# Patient Record
Sex: FEMALE | Race: BLACK OR AFRICAN AMERICAN | Employment: OTHER | URBAN - METROPOLITAN AREA
[De-identification: names, ages, dates, MRNs, and addresses within clinical notes are randomized per-mention and may not be internally consistent; named-entity substitution may affect disease eponyms.]

---

## 2017-02-03 ENCOUNTER — TELEPHONE (OUTPATIENT)
Dept: INTERNAL MEDICINE | Age: 72
End: 2017-02-03

## 2017-03-16 ENCOUNTER — OFFICE VISIT (OUTPATIENT)
Dept: INTERNAL MEDICINE | Age: 72
End: 2017-03-16

## 2017-03-16 ENCOUNTER — TELEPHONE (OUTPATIENT)
Dept: INTERNAL MEDICINE | Age: 72
End: 2017-03-16

## 2017-03-16 VITALS
DIASTOLIC BLOOD PRESSURE: 90 MMHG | SYSTOLIC BLOOD PRESSURE: 160 MMHG | BODY MASS INDEX: 30.73 KG/M2 | WEIGHT: 167 LBS | HEIGHT: 62 IN

## 2017-03-16 DIAGNOSIS — R53.83 OTHER FATIGUE: ICD-10-CM

## 2017-03-16 DIAGNOSIS — E78.2 MIXED HYPERLIPIDEMIA: ICD-10-CM

## 2017-03-16 DIAGNOSIS — R73.9 HYPERGLYCEMIA: ICD-10-CM

## 2017-03-16 DIAGNOSIS — M54.50 CHRONIC MIDLINE LOW BACK PAIN WITHOUT SCIATICA: ICD-10-CM

## 2017-03-16 DIAGNOSIS — Z00.00 WELL ADULT EXAM: Primary | ICD-10-CM

## 2017-03-16 DIAGNOSIS — I10 ESSENTIAL HYPERTENSION: ICD-10-CM

## 2017-03-16 DIAGNOSIS — G89.29 CHRONIC MIDLINE LOW BACK PAIN WITHOUT SCIATICA: ICD-10-CM

## 2017-03-16 LAB
A/G RATIO: 1.6 (ref 1.1–2.2)
ALBUMIN SERPL-MCNC: 4.9 G/DL (ref 3.4–5)
ALP BLD-CCNC: 93 U/L (ref 40–129)
ALT SERPL-CCNC: 19 U/L (ref 10–40)
ANION GAP SERPL CALCULATED.3IONS-SCNC: 19 MMOL/L (ref 3–16)
AST SERPL-CCNC: 21 U/L (ref 15–37)
BASOPHILS ABSOLUTE: 0 K/UL (ref 0–0.2)
BASOPHILS RELATIVE PERCENT: 0.7 %
BILIRUB SERPL-MCNC: 0.4 MG/DL (ref 0–1)
BUN BLDV-MCNC: 12 MG/DL (ref 7–20)
CALCIUM SERPL-MCNC: 10.4 MG/DL (ref 8.3–10.6)
CHLORIDE BLD-SCNC: 101 MMOL/L (ref 99–110)
CHOLESTEROL, TOTAL: 246 MG/DL (ref 0–199)
CO2: 23 MMOL/L (ref 21–32)
CREAT SERPL-MCNC: 0.9 MG/DL (ref 0.6–1.2)
EOSINOPHILS ABSOLUTE: 0 K/UL (ref 0–0.6)
EOSINOPHILS RELATIVE PERCENT: 0.5 %
GFR AFRICAN AMERICAN: >60
GFR NON-AFRICAN AMERICAN: >60
GLOBULIN: 3 G/DL
GLUCOSE BLD-MCNC: 131 MG/DL (ref 70–99)
HCT VFR BLD CALC: 42.6 % (ref 36–48)
HDLC SERPL-MCNC: 71 MG/DL (ref 40–60)
HEMOGLOBIN: 13.8 G/DL (ref 12–16)
LDL CHOLESTEROL CALCULATED: 158 MG/DL
LYMPHOCYTES ABSOLUTE: 2 K/UL (ref 1–5.1)
LYMPHOCYTES RELATIVE PERCENT: 43.5 %
MCH RBC QN AUTO: 29 PG (ref 26–34)
MCHC RBC AUTO-ENTMCNC: 32.3 G/DL (ref 31–36)
MCV RBC AUTO: 90 FL (ref 80–100)
MONOCYTES ABSOLUTE: 0.3 K/UL (ref 0–1.3)
MONOCYTES RELATIVE PERCENT: 6.4 %
NEUTROPHILS ABSOLUTE: 2.3 K/UL (ref 1.7–7.7)
NEUTROPHILS RELATIVE PERCENT: 48.9 %
PDW BLD-RTO: 16.1 % (ref 12.4–15.4)
PLATELET # BLD: 196 K/UL (ref 135–450)
PMV BLD AUTO: 9.9 FL (ref 5–10.5)
POTASSIUM SERPL-SCNC: 3.8 MMOL/L (ref 3.5–5.1)
RBC # BLD: 4.74 M/UL (ref 4–5.2)
SODIUM BLD-SCNC: 143 MMOL/L (ref 136–145)
TOTAL PROTEIN: 7.9 G/DL (ref 6.4–8.2)
TRIGL SERPL-MCNC: 87 MG/DL (ref 0–150)
TSH REFLEX: 0.85 UIU/ML (ref 0.27–4.2)
VLDLC SERPL CALC-MCNC: 17 MG/DL
WBC # BLD: 4.6 K/UL (ref 4–11)

## 2017-03-16 PROCEDURE — G0439 PPPS, SUBSEQ VISIT: HCPCS | Performed by: INTERNAL MEDICINE

## 2017-03-16 RX ORDER — LOSARTAN POTASSIUM 50 MG/1
TABLET ORAL
Qty: 30 TABLET | Refills: 11 | Status: SHIPPED | OUTPATIENT
Start: 2017-03-16 | End: 2018-05-07 | Stop reason: SDUPTHER

## 2017-03-16 ASSESSMENT — ENCOUNTER SYMPTOMS
CHEST TIGHTNESS: 0
COUGH: 0
EYE REDNESS: 0
BACK PAIN: 1
NAUSEA: 0
SHORTNESS OF BREATH: 0
ABDOMINAL PAIN: 0

## 2017-03-16 ASSESSMENT — PATIENT HEALTH QUESTIONNAIRE - PHQ9
2. FEELING DOWN, DEPRESSED OR HOPELESS: 0
SUM OF ALL RESPONSES TO PHQ9 QUESTIONS 1 & 2: 0
SUM OF ALL RESPONSES TO PHQ QUESTIONS 1-9: 0
1. LITTLE INTEREST OR PLEASURE IN DOING THINGS: 0

## 2017-03-17 LAB
ESTIMATED AVERAGE GLUCOSE: 154.2 MG/DL
HBA1C MFR BLD: 7 %

## 2017-05-19 ENCOUNTER — TELEPHONE (OUTPATIENT)
Dept: INTERNAL MEDICINE | Age: 72
End: 2017-05-19

## 2017-09-18 ENCOUNTER — OFFICE VISIT (OUTPATIENT)
Dept: INTERNAL MEDICINE | Age: 72
End: 2017-09-18

## 2017-09-18 VITALS
OXYGEN SATURATION: 96 % | HEART RATE: 88 BPM | SYSTOLIC BLOOD PRESSURE: 156 MMHG | HEIGHT: 62 IN | BODY MASS INDEX: 30.59 KG/M2 | RESPIRATION RATE: 12 BRPM | WEIGHT: 166.2 LBS | DIASTOLIC BLOOD PRESSURE: 74 MMHG

## 2017-09-18 DIAGNOSIS — M54.50 CHRONIC MIDLINE LOW BACK PAIN WITHOUT SCIATICA: ICD-10-CM

## 2017-09-18 DIAGNOSIS — I10 WHITE COAT SYNDROME WITH HYPERTENSION: ICD-10-CM

## 2017-09-18 DIAGNOSIS — G89.29 CHRONIC MIDLINE LOW BACK PAIN WITHOUT SCIATICA: ICD-10-CM

## 2017-09-18 DIAGNOSIS — I10 ESSENTIAL HYPERTENSION: Primary | ICD-10-CM

## 2017-09-18 PROCEDURE — 99213 OFFICE O/P EST LOW 20 MIN: CPT | Performed by: INTERNAL MEDICINE

## 2017-09-18 RX ORDER — COVID-19 ANTIGEN TEST
1 KIT MISCELLANEOUS DAILY PRN
COMMUNITY
End: 2022-08-24

## 2017-09-18 RX ORDER — HYDROCHLOROTHIAZIDE 12.5 MG/1
CAPSULE, GELATIN COATED ORAL
Qty: 90 CAPSULE | Refills: 3 | Status: CANCELLED | OUTPATIENT
Start: 2017-09-18

## 2017-09-18 ASSESSMENT — ENCOUNTER SYMPTOMS
EYE REDNESS: 0
CHEST TIGHTNESS: 0
COUGH: 0
ABDOMINAL PAIN: 0
BOWEL INCONTINENCE: 0
BACK PAIN: 1
SHORTNESS OF BREATH: 0
NAUSEA: 0

## 2017-11-07 RX ORDER — HYDROCHLOROTHIAZIDE 12.5 MG/1
CAPSULE, GELATIN COATED ORAL
Qty: 90 CAPSULE | Refills: 2 | Status: SHIPPED | OUTPATIENT
Start: 2017-11-07 | End: 2018-08-08 | Stop reason: SDUPTHER

## 2017-12-29 NOTE — TELEPHONE ENCOUNTER
Refill request for amlodipine medication.      Name of Mendy Khan 89 visit - 9/18/17     Pending visit - 3/16/18    Last refill -1/6/17

## 2018-01-01 RX ORDER — AMLODIPINE BESYLATE 10 MG/1
TABLET ORAL
Qty: 90 TABLET | Refills: 1 | Status: SHIPPED | OUTPATIENT
Start: 2018-01-01 | End: 2018-06-23 | Stop reason: SDUPTHER

## 2018-01-24 ENCOUNTER — TELEPHONE (OUTPATIENT)
Dept: INTERNAL MEDICINE | Age: 73
End: 2018-01-24

## 2018-01-24 RX ORDER — OSELTAMIVIR PHOSPHATE 75 MG/1
75 CAPSULE ORAL DAILY
Qty: 10 CAPSULE | Refills: 0 | Status: SHIPPED | OUTPATIENT
Start: 2018-01-24 | End: 2018-02-03

## 2018-03-16 ENCOUNTER — OFFICE VISIT (OUTPATIENT)
Dept: INTERNAL MEDICINE | Age: 73
End: 2018-03-16

## 2018-03-16 VITALS
WEIGHT: 167.8 LBS | HEART RATE: 77 BPM | OXYGEN SATURATION: 99 % | BODY MASS INDEX: 30.88 KG/M2 | DIASTOLIC BLOOD PRESSURE: 78 MMHG | SYSTOLIC BLOOD PRESSURE: 158 MMHG | RESPIRATION RATE: 14 BRPM | HEIGHT: 62 IN

## 2018-03-16 DIAGNOSIS — I10 ESSENTIAL HYPERTENSION: ICD-10-CM

## 2018-03-16 DIAGNOSIS — E78.2 MIXED HYPERLIPIDEMIA: ICD-10-CM

## 2018-03-16 DIAGNOSIS — R53.83 OTHER FATIGUE: ICD-10-CM

## 2018-03-16 DIAGNOSIS — M54.50 CHRONIC MIDLINE LOW BACK PAIN WITHOUT SCIATICA: ICD-10-CM

## 2018-03-16 DIAGNOSIS — R73.9 HYPERGLYCEMIA: ICD-10-CM

## 2018-03-16 DIAGNOSIS — G89.29 CHRONIC MIDLINE LOW BACK PAIN WITHOUT SCIATICA: ICD-10-CM

## 2018-03-16 DIAGNOSIS — I10 ESSENTIAL HYPERTENSION: Primary | ICD-10-CM

## 2018-03-16 LAB
A/G RATIO: 1.6 (ref 1.1–2.2)
ALBUMIN SERPL-MCNC: 4.7 G/DL (ref 3.4–5)
ALP BLD-CCNC: 84 U/L (ref 40–129)
ALT SERPL-CCNC: 18 U/L (ref 10–40)
ANION GAP SERPL CALCULATED.3IONS-SCNC: 16 MMOL/L (ref 3–16)
AST SERPL-CCNC: 21 U/L (ref 15–37)
BASOPHILS ABSOLUTE: 0 K/UL (ref 0–0.2)
BASOPHILS RELATIVE PERCENT: 0.8 %
BILIRUB SERPL-MCNC: 0.8 MG/DL (ref 0–1)
BUN BLDV-MCNC: 13 MG/DL (ref 7–20)
CALCIUM SERPL-MCNC: 9.8 MG/DL (ref 8.3–10.6)
CHLORIDE BLD-SCNC: 102 MMOL/L (ref 99–110)
CHOLESTEROL, TOTAL: 211 MG/DL (ref 0–199)
CO2: 25 MMOL/L (ref 21–32)
CREAT SERPL-MCNC: 0.7 MG/DL (ref 0.6–1.2)
EOSINOPHILS ABSOLUTE: 0.1 K/UL (ref 0–0.6)
EOSINOPHILS RELATIVE PERCENT: 1.2 %
GFR AFRICAN AMERICAN: >60
GFR NON-AFRICAN AMERICAN: >60
GLOBULIN: 3 G/DL
GLUCOSE BLD-MCNC: 122 MG/DL (ref 70–99)
HCT VFR BLD CALC: 39.5 % (ref 36–48)
HDLC SERPL-MCNC: 69 MG/DL (ref 40–60)
HEMOGLOBIN: 13.3 G/DL (ref 12–16)
LDL CHOLESTEROL CALCULATED: 127 MG/DL
LYMPHOCYTES ABSOLUTE: 2 K/UL (ref 1–5.1)
LYMPHOCYTES RELATIVE PERCENT: 39.6 %
MCH RBC QN AUTO: 29.8 PG (ref 26–34)
MCHC RBC AUTO-ENTMCNC: 33.6 G/DL (ref 31–36)
MCV RBC AUTO: 88.9 FL (ref 80–100)
MONOCYTES ABSOLUTE: 0.3 K/UL (ref 0–1.3)
MONOCYTES RELATIVE PERCENT: 6.3 %
NEUTROPHILS ABSOLUTE: 2.7 K/UL (ref 1.7–7.7)
NEUTROPHILS RELATIVE PERCENT: 52.1 %
PDW BLD-RTO: 15.4 % (ref 12.4–15.4)
PLATELET # BLD: 187 K/UL (ref 135–450)
PMV BLD AUTO: 9.8 FL (ref 5–10.5)
POTASSIUM SERPL-SCNC: 3.8 MMOL/L (ref 3.5–5.1)
RBC # BLD: 4.44 M/UL (ref 4–5.2)
SODIUM BLD-SCNC: 143 MMOL/L (ref 136–145)
TOTAL PROTEIN: 7.7 G/DL (ref 6.4–8.2)
TRIGL SERPL-MCNC: 74 MG/DL (ref 0–150)
TSH REFLEX: 1.46 UIU/ML (ref 0.27–4.2)
VLDLC SERPL CALC-MCNC: 15 MG/DL
WBC # BLD: 5.2 K/UL (ref 4–11)

## 2018-03-16 PROCEDURE — 99213 OFFICE O/P EST LOW 20 MIN: CPT | Performed by: INTERNAL MEDICINE

## 2018-03-16 ASSESSMENT — ENCOUNTER SYMPTOMS
COUGH: 0
CHEST TIGHTNESS: 0
ABDOMINAL PAIN: 0
SHORTNESS OF BREATH: 0
BACK PAIN: 1
NAUSEA: 0
EYE REDNESS: 0

## 2018-03-16 NOTE — PROGRESS NOTES
for dizziness, weakness and headaches. Hematological: Negative for adenopathy. Does not bruise/bleed easily. Psychiatric/Behavioral: Negative for sleep disturbance. The patient is nervous/anxious. Objective:   Physical Exam   Constitutional: She is oriented to person, place, and time. She appears well-developed and well-nourished. Cardiovascular: Normal rate and regular rhythm. Pulmonary/Chest: Effort normal and breath sounds normal.   Abdominal: Soft. Bowel sounds are normal.   Musculoskeletal:   Lower back stiffness , and pain    Neurological: She is alert and oriented to person, place, and time. Psychiatric:   She is anxious and often gets white coat hypertension. Assessment and plan       1. Essential hypertension  Improved blood pressure. She has a long record of home blood pressure readings which are all in the range of 120/80. Her blood pressure tends to run higher because of her fear of being in the doctor's office.  - Comprehensive Metabolic Panel; Future    2. Mixed hyperlipidemia  Stable. Continue diet. Check labs. - Comprehensive Metabolic Panel; Future  - Lipid Panel; Future  - TSH with Reflex; Future    3. Chronic midline low back pain without sciatica  Stable. Rest. Lower back exercises are given. Discussion of physical therapy. She will call if she wants to get started with physical therapy. 4. Other fatigue  Stable. Check labs. - TSH with Reflex; Future  - CBC Auto Differential; Future    5. Hyperglycemia  History of hyperglycemia. Recheck glucose level along with A1c level.   - Hemoglobin A1C; Future

## 2018-03-16 NOTE — PATIENT INSTRUCTIONS
chin.  5. Keep your shoulders back and relaxed. Knee extension    1. Sit tall on the ball with your feet planted in front of you, hip-width apart. As you do this exercise, avoid slumping your shoulders and arching your back. 2. Rest your hands on the ball near your hip or a steady object next to you. (If you feel very stable on the ball, rest your hands in your lap or at your side.)  3. Slowly straighten one leg at the knee. Slowly lower it back down. Repeat with the other leg. 4. Repeat this exercise 8 to 12 times. Roll-ups    1. Lie on your back with your knees bent, feet resting on the floor. 2. Lay the ball on your thighs. Rest your hands up high on the ball. 3. Raising your head and shoulder blades, roll the ball up your thighs. Exhale as you roll up. 4. If this is hard on your neck, gently support your lower head and upper neck with one hand. Don't use that hand to pull your head up. 5. Repeat 8 to 12 times. Ball curls    1. Lie on your back with your ankles resting on the ball, knees straight. 2. Use your legs to roll the exercise ball toward you. Allow your knees to bend and move closer to your chest.  3. Pause briefly, and then roll the ball to the starting position. Try to keep the ball rolling straight. You will feel the muscles in your lower belly working. 4. Repeat 8 to 12 times. Bridge with ball under legs    1. Lie on your back with your legs up, calves resting on the ball. For more challenge, rest your heels on the ball. 2. Look up at the ceiling, and keep your chin relaxed. You can place a small pillow under your head or neck for comfort. 3. With your arms by your side, press your hands onto the floor for stability. 4. Tighten your belly muscles by pulling in your belly button toward your spine. 5. Push your heels down toward the floor, squeeze your buttocks, and lift your hips off the floor until your shoulders, hips, and knees are all in a straight line.   6. Try to keep the ball you.  2. Walk your hands forward until your legs are straight on the ball. This is the plank position. 3. When in plank position, hold your body straight and tighten your belly and buttocks muscles. Keep your chin slightly tucked. 4. Roll as far forward as you can without losing your balance or letting your hips drop. You may stop with the ball under your thighs, or even under your knees or shins. 5. Hold a few seconds, then walk your hands back and return to the start position. 6. Repeat 8 to 12 times. Push-up with thighs on ball    1. Kneel over the ball. Place your hands on the floor in front of you. 2. Walk your hands forward until your legs are straight on the ball. This is the plank position. 3. When in plank position, hold your body straight and tighten your belly and buttocks muscles. Keep your chin slightly tucked. 4. Roll as far forward as you can without losing your balance or letting your hips drop. You may stop with the ball under your thighs, or even under your knees or shins. 5. Bend your elbows. Slowly lower your body toward the ground as far as you can without losing your balance. 6. If your wrists hurt, try moving your hands a little farther apart so they're not right under your shoulders. 7. Slowly straighten your arms. 8. Do 8 to 12 of these push-ups. Wall squat with ball    1. Stand facing away from a wall. Place your feet about shoulder-width apart. 2. Place the ball between your middle back and the wall. Move your feet out in front of you so they are about a foot in front of your hips. 3. Keep your arms at your sides, or put your hands on your hips. 4. Slowly squat down as if you are going to sit in a chair, rolling your back over the ball as you squat. The ball should move with you but stay pressed into the wall. 5. Be sure that your knees do not go in front of your toes as you squat. 6. Hold for 6 seconds. 7. Slowly rise to your standing position.   8. Repeat 8 to 12

## 2018-03-17 LAB
ESTIMATED AVERAGE GLUCOSE: 162.8 MG/DL
HBA1C MFR BLD: 7.3 %

## 2018-04-13 ENCOUNTER — HOSPITAL ENCOUNTER (OUTPATIENT)
Dept: MAMMOGRAPHY | Age: 73
Discharge: HOME | End: 2018-04-13
Attending: INTERNAL MEDICINE | Admitting: INTERNAL MEDICINE

## 2018-04-13 DIAGNOSIS — Z12.31 VISIT FOR SCREENING MAMMOGRAM: ICD-10-CM

## 2018-04-18 ENCOUNTER — HOSPITAL ENCOUNTER (OUTPATIENT)
Dept: MAMMOGRAPHY | Age: 73
Discharge: HOME | End: 2018-04-18
Attending: INTERNAL MEDICINE | Admitting: INTERNAL MEDICINE

## 2018-04-18 DIAGNOSIS — R92.8 ABNORMAL FINDING ON MAMMOGRAPHY: ICD-10-CM

## 2018-04-18 DIAGNOSIS — R92.8 ABNORMAL MAMMOGRAM: ICD-10-CM

## 2018-06-11 ENCOUNTER — TELEPHONE (OUTPATIENT)
Dept: INTERNAL MEDICINE | Age: 73
End: 2018-06-11

## 2018-06-25 RX ORDER — AMLODIPINE BESYLATE 10 MG/1
TABLET ORAL
Qty: 90 TABLET | Refills: 1 | Status: SHIPPED | OUTPATIENT
Start: 2018-06-25 | End: 2018-12-26 | Stop reason: SDUPTHER

## 2018-08-09 RX ORDER — HYDROCHLOROTHIAZIDE 12.5 MG/1
CAPSULE, GELATIN COATED ORAL
Qty: 90 CAPSULE | Refills: 2 | Status: SHIPPED | OUTPATIENT
Start: 2018-08-09 | End: 2019-03-19 | Stop reason: SDUPTHER

## 2018-09-17 ENCOUNTER — OFFICE VISIT (OUTPATIENT)
Dept: INTERNAL MEDICINE | Age: 73
End: 2018-09-17

## 2018-09-17 VITALS
DIASTOLIC BLOOD PRESSURE: 76 MMHG | RESPIRATION RATE: 16 BRPM | HEART RATE: 84 BPM | SYSTOLIC BLOOD PRESSURE: 160 MMHG | WEIGHT: 168.6 LBS | BODY MASS INDEX: 31.03 KG/M2 | OXYGEN SATURATION: 99 % | HEIGHT: 62 IN

## 2018-09-17 DIAGNOSIS — G89.29 CHRONIC MIDLINE LOW BACK PAIN WITHOUT SCIATICA: ICD-10-CM

## 2018-09-17 DIAGNOSIS — I10 ESSENTIAL HYPERTENSION: Primary | ICD-10-CM

## 2018-09-17 DIAGNOSIS — Z78.0 POST-MENOPAUSAL: ICD-10-CM

## 2018-09-17 DIAGNOSIS — R73.9 HYPERGLYCEMIA: ICD-10-CM

## 2018-09-17 DIAGNOSIS — R21 RASH: ICD-10-CM

## 2018-09-17 DIAGNOSIS — M54.50 CHRONIC MIDLINE LOW BACK PAIN WITHOUT SCIATICA: ICD-10-CM

## 2018-09-17 PROCEDURE — 99214 OFFICE O/P EST MOD 30 MIN: CPT | Performed by: INTERNAL MEDICINE

## 2018-09-17 PROCEDURE — 3288F FALL RISK ASSESSMENT DOCD: CPT | Performed by: INTERNAL MEDICINE

## 2018-09-17 PROCEDURE — G8510 SCR DEP NEG, NO PLAN REQD: HCPCS | Performed by: INTERNAL MEDICINE

## 2018-09-17 RX ORDER — DIAPER,BRIEF,INFANT-TODD,DISP
EACH MISCELLANEOUS
Qty: 30 G | Refills: 1 | Status: SHIPPED | OUTPATIENT
Start: 2018-09-17 | End: 2018-09-24

## 2018-09-17 ASSESSMENT — ENCOUNTER SYMPTOMS
CHEST TIGHTNESS: 0
SHORTNESS OF BREATH: 0
COUGH: 0
EYE REDNESS: 0
ABDOMINAL PAIN: 0
BACK PAIN: 1
NAUSEA: 0

## 2018-09-17 ASSESSMENT — PATIENT HEALTH QUESTIONNAIRE - PHQ9
1. LITTLE INTEREST OR PLEASURE IN DOING THINGS: 0
2. FEELING DOWN, DEPRESSED OR HOPELESS: 0
SUM OF ALL RESPONSES TO PHQ QUESTIONS 1-9: 0
SUM OF ALL RESPONSES TO PHQ9 QUESTIONS 1 & 2: 0
SUM OF ALL RESPONSES TO PHQ QUESTIONS 1-9: 0

## 2018-09-17 NOTE — PROGRESS NOTES
Subjective:      Patient ID: Woodrow Malik is a 72 y.o. female    Chief Complaint   Patient presents with    Hypertension       Hypertension   This is a chronic problem. The current episode started more than 1 year ago. The problem is controlled. Associated symptoms include headaches. Pertinent negatives include no chest pain, neck pain, peripheral edema or shortness of breath. (Nasal congestion ) Agents associated with hypertension include NSAIDs. Risk factors for coronary artery disease include post-menopausal state. Past treatments include calcium channel blockers, diuretics and angiotensin blockers. The current treatment provides significant improvement. There are no compliance problems. Back Pain   This is a chronic problem. The current episode started more than 1 year ago. The problem is unchanged. The pain is present in the lumbar spine. The quality of the pain is described as aching. The pain is at a severity of 3/10. The pain is moderate. The pain is worse during the day. The symptoms are aggravated by bending and twisting. Associated symptoms include headaches. Pertinent negatives include no abdominal pain, chest pain, dysuria, fever, numbness or weakness. She has tried home exercises, ice and walking for the symptoms. The treatment provided mild relief. I spoke to her about her elevated blood sugar today. She is in her ear does not want to get her blood drawn  today. She says she is trying to watch her sugar, reduce her weight, and do regular exercise. I told her that as she gets older that this may continue to be a problem and we should certainly keep a check on it with blood tests.     Current Outpatient Prescriptions on File Prior to Visit   Medication Sig Dispense Refill    hydrochlorothiazide (MICROZIDE) 12.5 MG capsule TAKE 1 CAPSULE BY MOUTH EVERY MORNING 90 capsule 2    amLODIPine (NORVASC) 10 MG tablet TAKE 1 TABLET BY MOUTH EVERY DAY 90 tablet 1    losartan (COZAAR) 50 MG tablet TAKE 1 low back pain. Continue home exercises. Use ice if needed. 3. Rash  Itching under her right eye. Try topical 1% hydrocortisone cream.  - hydrocortisone (ALA-SUPRIYA) 1 % cream; Apply topically 2 times daily if needed for itching. Dispense: 30 g; Refill: 1    4. Post-menopausal  Stable. Bone density ordered. - DEXA Bone Density Axial Skeleton; Future    5. Hyperglycemia  Elevated blood sugar. A1c 7. Discussed further testing. She denies any foot problems. She declines urine or blood testing today. -  DIABETES FOOT EXAM  - Microalbumin / creatinine urine ratio;  Future

## 2018-12-26 RX ORDER — AMLODIPINE BESYLATE 10 MG/1
TABLET ORAL
Qty: 90 TABLET | Refills: 0 | Status: SHIPPED | OUTPATIENT
Start: 2018-12-26 | End: 2019-03-19 | Stop reason: SDUPTHER

## 2018-12-28 ENCOUNTER — HOSPITAL ENCOUNTER (OUTPATIENT)
Dept: GENERAL RADIOLOGY | Age: 73
Discharge: HOME | End: 2018-12-28
Payer: MEDICARE

## 2018-12-28 DIAGNOSIS — Z78.0 POST-MENOPAUSAL: ICD-10-CM

## 2018-12-28 PROCEDURE — 77080 DXA BONE DENSITY AXIAL: CPT

## 2019-03-19 ENCOUNTER — OFFICE VISIT (OUTPATIENT)
Dept: INTERNAL MEDICINE CLINIC | Age: 74
End: 2019-03-19
Payer: MEDICARE

## 2019-03-19 VITALS
HEIGHT: 62 IN | DIASTOLIC BLOOD PRESSURE: 60 MMHG | OXYGEN SATURATION: 99 % | HEART RATE: 83 BPM | SYSTOLIC BLOOD PRESSURE: 160 MMHG | BODY MASS INDEX: 30.55 KG/M2 | RESPIRATION RATE: 16 BRPM | WEIGHT: 166 LBS

## 2019-03-19 DIAGNOSIS — I10 ESSENTIAL HYPERTENSION: ICD-10-CM

## 2019-03-19 DIAGNOSIS — R53.83 OTHER FATIGUE: ICD-10-CM

## 2019-03-19 DIAGNOSIS — R73.9 HYPERGLYCEMIA: ICD-10-CM

## 2019-03-19 DIAGNOSIS — I10 WHITE COAT SYNDROME WITH HYPERTENSION: ICD-10-CM

## 2019-03-19 DIAGNOSIS — G89.29 CHRONIC MIDLINE LOW BACK PAIN WITHOUT SCIATICA: ICD-10-CM

## 2019-03-19 DIAGNOSIS — M54.50 CHRONIC MIDLINE LOW BACK PAIN WITHOUT SCIATICA: ICD-10-CM

## 2019-03-19 DIAGNOSIS — E78.2 MIXED HYPERLIPIDEMIA: ICD-10-CM

## 2019-03-19 DIAGNOSIS — Z00.00 ROUTINE GENERAL MEDICAL EXAMINATION AT A HEALTH CARE FACILITY: ICD-10-CM

## 2019-03-19 DIAGNOSIS — Z00.00 WELL ADULT EXAM: Primary | ICD-10-CM

## 2019-03-19 LAB
A/G RATIO: 1.5 (ref 1.1–2.2)
ALBUMIN SERPL-MCNC: 4.7 G/DL (ref 3.4–5)
ALP BLD-CCNC: 98 U/L (ref 40–129)
ALT SERPL-CCNC: 15 U/L (ref 10–40)
ANION GAP SERPL CALCULATED.3IONS-SCNC: 15 MMOL/L (ref 3–16)
AST SERPL-CCNC: 18 U/L (ref 15–37)
BASOPHILS ABSOLUTE: 0 K/UL (ref 0–0.2)
BASOPHILS RELATIVE PERCENT: 1 %
BILIRUB SERPL-MCNC: 0.8 MG/DL (ref 0–1)
BUN BLDV-MCNC: 11 MG/DL (ref 7–20)
CALCIUM SERPL-MCNC: 10.2 MG/DL (ref 8.3–10.6)
CHLORIDE BLD-SCNC: 101 MMOL/L (ref 99–110)
CHOLESTEROL, TOTAL: 225 MG/DL (ref 0–199)
CO2: 24 MMOL/L (ref 21–32)
CREAT SERPL-MCNC: 0.8 MG/DL (ref 0.6–1.2)
EOSINOPHILS ABSOLUTE: 0.1 K/UL (ref 0–0.6)
EOSINOPHILS RELATIVE PERCENT: 1.1 %
GFR AFRICAN AMERICAN: >60
GFR NON-AFRICAN AMERICAN: >60
GLOBULIN: 3.2 G/DL
GLUCOSE BLD-MCNC: 137 MG/DL (ref 70–99)
HCT VFR BLD CALC: 42 % (ref 36–48)
HDLC SERPL-MCNC: 65 MG/DL (ref 40–60)
HEMOGLOBIN: 13.6 G/DL (ref 12–16)
LDL CHOLESTEROL CALCULATED: 142 MG/DL
LYMPHOCYTES ABSOLUTE: 2 K/UL (ref 1–5.1)
LYMPHOCYTES RELATIVE PERCENT: 40.1 %
MCH RBC QN AUTO: 29.6 PG (ref 26–34)
MCHC RBC AUTO-ENTMCNC: 32.3 G/DL (ref 31–36)
MCV RBC AUTO: 91.5 FL (ref 80–100)
MONOCYTES ABSOLUTE: 0.2 K/UL (ref 0–1.3)
MONOCYTES RELATIVE PERCENT: 5 %
NEUTROPHILS ABSOLUTE: 2.6 K/UL (ref 1.7–7.7)
NEUTROPHILS RELATIVE PERCENT: 52.8 %
PDW BLD-RTO: 14.7 % (ref 12.4–15.4)
PLATELET # BLD: 210 K/UL (ref 135–450)
PMV BLD AUTO: 9.8 FL (ref 5–10.5)
POTASSIUM SERPL-SCNC: 4.3 MMOL/L (ref 3.5–5.1)
RBC # BLD: 4.59 M/UL (ref 4–5.2)
SODIUM BLD-SCNC: 140 MMOL/L (ref 136–145)
TOTAL PROTEIN: 7.9 G/DL (ref 6.4–8.2)
TRIGL SERPL-MCNC: 91 MG/DL (ref 0–150)
TSH REFLEX: 1.45 UIU/ML (ref 0.27–4.2)
VLDLC SERPL CALC-MCNC: 18 MG/DL
WBC # BLD: 5 K/UL (ref 4–11)

## 2019-03-19 PROCEDURE — G0438 PPPS, INITIAL VISIT: HCPCS | Performed by: INTERNAL MEDICINE

## 2019-03-19 RX ORDER — LOSARTAN POTASSIUM 50 MG/1
TABLET ORAL
Qty: 90 TABLET | Refills: 3 | Status: SHIPPED | OUTPATIENT
Start: 2019-03-19 | End: 2020-07-16

## 2019-03-19 RX ORDER — AMLODIPINE BESYLATE 10 MG/1
TABLET ORAL
Qty: 90 TABLET | Refills: 3 | Status: SHIPPED | OUTPATIENT
Start: 2019-03-19 | End: 2020-03-09

## 2019-03-19 RX ORDER — HYDROCHLOROTHIAZIDE 12.5 MG/1
CAPSULE, GELATIN COATED ORAL
Qty: 90 CAPSULE | Refills: 3 | Status: SHIPPED | OUTPATIENT
Start: 2019-03-19 | End: 2020-03-10

## 2019-03-19 ASSESSMENT — PATIENT HEALTH QUESTIONNAIRE - PHQ9
1. LITTLE INTEREST OR PLEASURE IN DOING THINGS: 0
2. FEELING DOWN, DEPRESSED OR HOPELESS: 0
SUM OF ALL RESPONSES TO PHQ9 QUESTIONS 1 & 2: 0
SUM OF ALL RESPONSES TO PHQ QUESTIONS 1-9: 0
SUM OF ALL RESPONSES TO PHQ QUESTIONS 1-9: 0

## 2019-03-19 ASSESSMENT — ANXIETY QUESTIONNAIRES: GAD7 TOTAL SCORE: 0

## 2019-03-19 ASSESSMENT — VISUAL ACUITY
OD_CC: 20/50
OS_CC: 20/50

## 2019-03-19 ASSESSMENT — LIFESTYLE VARIABLES: HOW OFTEN DO YOU HAVE A DRINK CONTAINING ALCOHOL: 0

## 2019-03-20 LAB
ESTIMATED AVERAGE GLUCOSE: 174.3 MG/DL
HBA1C MFR BLD: 7.7 %

## 2019-03-25 DIAGNOSIS — E11.9 TYPE 2 DIABETES MELLITUS WITHOUT COMPLICATION, WITHOUT LONG-TERM CURRENT USE OF INSULIN (HCC): Primary | ICD-10-CM

## 2019-03-26 ENCOUNTER — TELEPHONE (OUTPATIENT)
Dept: INTERNAL MEDICINE CLINIC | Age: 74
End: 2019-03-26

## 2019-03-26 NOTE — TELEPHONE ENCOUNTER
Patient understands the results of her labs; however she feels the medication she uses for HTN could be causing her sugars and A1C to steadily increase. Is there any correlation? Please advise.

## 2019-04-23 RX ORDER — PIOGLITAZONEHYDROCHLORIDE 15 MG/1
15 TABLET ORAL DAILY
Qty: 90 TABLET | Refills: 3 | Status: SHIPPED | OUTPATIENT
Start: 2019-04-23 | End: 2021-04-02

## 2019-07-08 ENCOUNTER — APPOINTMENT (OUTPATIENT)
Dept: CT IMAGING | Age: 74
End: 2019-07-08
Payer: MEDICARE

## 2019-07-08 ENCOUNTER — HOSPITAL ENCOUNTER (EMERGENCY)
Age: 74
Discharge: HOME | End: 2019-07-08
Payer: MEDICARE

## 2019-07-08 VITALS
WEIGHT: 163.36 LBS | HEART RATE: 65 BPM | SYSTOLIC BLOOD PRESSURE: 141 MMHG | HEIGHT: 62 IN | OXYGEN SATURATION: 99 % | DIASTOLIC BLOOD PRESSURE: 70 MMHG | BODY MASS INDEX: 30.06 KG/M2 | RESPIRATION RATE: 14 BRPM | TEMPERATURE: 98.8 F

## 2019-07-08 DIAGNOSIS — N20.1 URETEROLITHIASIS: Primary | ICD-10-CM

## 2019-07-08 LAB
A/G RATIO: 1.7 (ref 1.1–2.2)
ALBUMIN SERPL-MCNC: 4.8 G/DL (ref 3.4–5)
ALP BLD-CCNC: 83 U/L (ref 40–129)
ALT SERPL-CCNC: 16 U/L (ref 10–40)
ANION GAP SERPL CALCULATED.3IONS-SCNC: 14 MMOL/L (ref 3–16)
AST SERPL-CCNC: 20 U/L (ref 15–37)
BACTERIA: ABNORMAL /HPF
BASOPHILS ABSOLUTE: 0 K/UL (ref 0–0.2)
BASOPHILS RELATIVE PERCENT: 0.5 %
BILIRUB SERPL-MCNC: 0.7 MG/DL (ref 0–1)
BILIRUBIN URINE: NEGATIVE
BLOOD, URINE: ABNORMAL
BUN BLDV-MCNC: 12 MG/DL (ref 7–20)
CALCIUM SERPL-MCNC: 10.1 MG/DL (ref 8.3–10.6)
CHLORIDE BLD-SCNC: 105 MMOL/L (ref 99–110)
CLARITY: CLEAR
CO2: 22 MMOL/L (ref 21–32)
COLOR: YELLOW
CREAT SERPL-MCNC: 1 MG/DL (ref 0.6–1.2)
EOSINOPHILS ABSOLUTE: 0 K/UL (ref 0–0.6)
EOSINOPHILS RELATIVE PERCENT: 0 %
EPITHELIAL CELLS, UA: 1 /HPF (ref 0–5)
GFR AFRICAN AMERICAN: >60
GFR NON-AFRICAN AMERICAN: 54
GLOBULIN: 2.9 G/DL
GLUCOSE BLD-MCNC: 152 MG/DL (ref 70–99)
GLUCOSE URINE: NEGATIVE MG/DL
HCT VFR BLD CALC: 40.2 % (ref 36–48)
HEMOGLOBIN: 13.2 G/DL (ref 12–16)
HYALINE CASTS: 2 /LPF (ref 0–8)
INR BLD: 1.04 (ref 0.86–1.14)
KETONES, URINE: NEGATIVE MG/DL
LACTIC ACID: 1.4 MMOL/L (ref 0.4–2)
LEUKOCYTE ESTERASE, URINE: NEGATIVE
LIPASE: 7 U/L (ref 13–60)
LYMPHOCYTES ABSOLUTE: 1 K/UL (ref 1–5.1)
LYMPHOCYTES RELATIVE PERCENT: 12.2 %
MCH RBC QN AUTO: 29.7 PG (ref 26–34)
MCHC RBC AUTO-ENTMCNC: 32.9 G/DL (ref 31–36)
MCV RBC AUTO: 90.1 FL (ref 80–100)
MICROSCOPIC EXAMINATION: YES
MONOCYTES ABSOLUTE: 0.3 K/UL (ref 0–1.3)
MONOCYTES RELATIVE PERCENT: 4.4 %
NEUTROPHILS ABSOLUTE: 6.5 K/UL (ref 1.7–7.7)
NEUTROPHILS RELATIVE PERCENT: 82.9 %
NITRITE, URINE: NEGATIVE
PDW BLD-RTO: 15.3 % (ref 12.4–15.4)
PH UA: 5.5 (ref 5–8)
PLATELET # BLD: 192 K/UL (ref 135–450)
PMV BLD AUTO: 9.2 FL (ref 5–10.5)
POTASSIUM REFLEX MAGNESIUM: 3.8 MMOL/L (ref 3.5–5.1)
PROTEIN UA: ABNORMAL MG/DL
PROTHROMBIN TIME: 11.8 SEC (ref 9.8–13)
RBC # BLD: 4.46 M/UL (ref 4–5.2)
RBC UA: 1 /HPF (ref 0–4)
SODIUM BLD-SCNC: 141 MMOL/L (ref 136–145)
SPECIFIC GRAVITY UA: 1.01 (ref 1–1.03)
TOTAL PROTEIN: 7.7 G/DL (ref 6.4–8.2)
URINE REFLEX TO CULTURE: ABNORMAL
URINE TYPE: ABNORMAL
UROBILINOGEN, URINE: 0.2 E.U./DL
WBC # BLD: 7.9 K/UL (ref 4–11)
WBC UA: 2 /HPF (ref 0–5)

## 2019-07-08 PROCEDURE — 99284 EMERGENCY DEPT VISIT MOD MDM: CPT

## 2019-07-08 PROCEDURE — 85025 COMPLETE CBC W/AUTO DIFF WBC: CPT

## 2019-07-08 PROCEDURE — 96374 THER/PROPH/DIAG INJ IV PUSH: CPT

## 2019-07-08 PROCEDURE — 83690 ASSAY OF LIPASE: CPT

## 2019-07-08 PROCEDURE — 6360000002 HC RX W HCPCS: Performed by: NURSE PRACTITIONER

## 2019-07-08 PROCEDURE — 2580000003 HC RX 258: Performed by: NURSE PRACTITIONER

## 2019-07-08 PROCEDURE — 81001 URINALYSIS AUTO W/SCOPE: CPT

## 2019-07-08 PROCEDURE — 6360000002 HC RX W HCPCS: Performed by: EMERGENCY MEDICINE

## 2019-07-08 PROCEDURE — 80053 COMPREHEN METABOLIC PANEL: CPT

## 2019-07-08 PROCEDURE — 96376 TX/PRO/DX INJ SAME DRUG ADON: CPT

## 2019-07-08 PROCEDURE — 6370000000 HC RX 637 (ALT 250 FOR IP): Performed by: NURSE PRACTITIONER

## 2019-07-08 PROCEDURE — 85610 PROTHROMBIN TIME: CPT

## 2019-07-08 PROCEDURE — 74177 CT ABD & PELVIS W/CONTRAST: CPT

## 2019-07-08 PROCEDURE — 83605 ASSAY OF LACTIC ACID: CPT

## 2019-07-08 PROCEDURE — 96375 TX/PRO/DX INJ NEW DRUG ADDON: CPT

## 2019-07-08 PROCEDURE — 96361 HYDRATE IV INFUSION ADD-ON: CPT

## 2019-07-08 PROCEDURE — 6360000004 HC RX CONTRAST MEDICATION: Performed by: NURSE PRACTITIONER

## 2019-07-08 RX ORDER — MORPHINE SULFATE 2 MG/ML
4 INJECTION, SOLUTION INTRAMUSCULAR; INTRAVENOUS ONCE
Status: COMPLETED | OUTPATIENT
Start: 2019-07-08 | End: 2019-07-08

## 2019-07-08 RX ORDER — ONDANSETRON 2 MG/ML
4 INJECTION INTRAMUSCULAR; INTRAVENOUS ONCE
Status: COMPLETED | OUTPATIENT
Start: 2019-07-08 | End: 2019-07-08

## 2019-07-08 RX ORDER — KETOROLAC TROMETHAMINE 30 MG/ML
15 INJECTION, SOLUTION INTRAMUSCULAR; INTRAVENOUS ONCE
Status: COMPLETED | OUTPATIENT
Start: 2019-07-08 | End: 2019-07-08

## 2019-07-08 RX ORDER — 0.9 % SODIUM CHLORIDE 0.9 %
1000 INTRAVENOUS SOLUTION INTRAVENOUS ONCE
Status: COMPLETED | OUTPATIENT
Start: 2019-07-08 | End: 2019-07-08

## 2019-07-08 RX ORDER — TAMSULOSIN HYDROCHLORIDE 0.4 MG/1
0.4 CAPSULE ORAL ONCE
Status: COMPLETED | OUTPATIENT
Start: 2019-07-08 | End: 2019-07-08

## 2019-07-08 RX ORDER — IBUPROFEN 800 MG/1
800 TABLET ORAL EVERY 8 HOURS PRN
Qty: 30 TABLET | Refills: 0 | Status: SHIPPED | OUTPATIENT
Start: 2019-07-08 | End: 2021-04-02

## 2019-07-08 RX ORDER — HYDROCODONE BITARTRATE AND ACETAMINOPHEN 5; 325 MG/1; MG/1
1 TABLET ORAL EVERY 6 HOURS PRN
Qty: 10 TABLET | Refills: 0 | Status: SHIPPED | OUTPATIENT
Start: 2019-07-08 | End: 2019-07-11

## 2019-07-08 RX ORDER — TAMSULOSIN HYDROCHLORIDE 0.4 MG/1
0.4 CAPSULE ORAL DAILY
Qty: 6 CAPSULE | Refills: 0 | Status: SHIPPED | OUTPATIENT
Start: 2019-07-08 | End: 2020-09-22 | Stop reason: ALTCHOICE

## 2019-07-08 RX ADMIN — ONDANSETRON 4 MG: 2 INJECTION INTRAMUSCULAR; INTRAVENOUS at 19:27

## 2019-07-08 RX ADMIN — TAMSULOSIN HYDROCHLORIDE 0.4 MG: 0.4 CAPSULE ORAL at 20:47

## 2019-07-08 RX ADMIN — ONDANSETRON 4 MG: 2 INJECTION INTRAMUSCULAR; INTRAVENOUS at 20:15

## 2019-07-08 RX ADMIN — KETOROLAC TROMETHAMINE 15 MG: 30 INJECTION, SOLUTION INTRAMUSCULAR at 20:35

## 2019-07-08 RX ADMIN — SODIUM CHLORIDE 1000 ML: 9 INJECTION, SOLUTION INTRAVENOUS at 19:27

## 2019-07-08 RX ADMIN — MORPHINE SULFATE 4 MG: 2 INJECTION, SOLUTION INTRAMUSCULAR; INTRAVENOUS at 19:27

## 2019-07-08 RX ADMIN — IOPAMIDOL 75 ML: 755 INJECTION, SOLUTION INTRAVENOUS at 19:46

## 2019-07-08 ASSESSMENT — ENCOUNTER SYMPTOMS
ABDOMINAL DISTENTION: 0
VOMITING: 1
NAUSEA: 1
COLOR CHANGE: 0
BACK PAIN: 0
SHORTNESS OF BREATH: 0
DIARRHEA: 0
ABDOMINAL PAIN: 1

## 2019-07-08 ASSESSMENT — PAIN SCALES - GENERAL
PAINLEVEL_OUTOF10: 7
PAINLEVEL_OUTOF10: 0
PAINLEVEL_OUTOF10: 9
PAINLEVEL_OUTOF10: 9
PAINLEVEL_OUTOF10: 7

## 2019-07-08 ASSESSMENT — PAIN DESCRIPTION - PROGRESSION: CLINICAL_PROGRESSION: NOT CHANGED

## 2019-07-08 ASSESSMENT — PAIN DESCRIPTION - ONSET: ONSET: ON-GOING

## 2019-07-08 ASSESSMENT — PAIN DESCRIPTION - ORIENTATION: ORIENTATION: RIGHT;LOWER

## 2019-07-08 ASSESSMENT — PAIN DESCRIPTION - FREQUENCY: FREQUENCY: INTERMITTENT

## 2019-07-08 ASSESSMENT — PAIN - FUNCTIONAL ASSESSMENT: PAIN_FUNCTIONAL_ASSESSMENT: PREVENTS OR INTERFERES SOME ACTIVE ACTIVITIES AND ADLS

## 2019-07-08 ASSESSMENT — PAIN DESCRIPTION - LOCATION: LOCATION: ABDOMEN

## 2019-07-08 ASSESSMENT — PAIN DESCRIPTION - DESCRIPTORS: DESCRIPTORS: CONSTANT

## 2019-07-08 ASSESSMENT — PAIN DESCRIPTION - PAIN TYPE: TYPE: ACUTE PAIN

## 2019-07-08 NOTE — ED PROVIDER NOTES
needed. The patient and / or the family were informed of the results of anytests, a time was given to answer questions, a plan was proposed and they agreed with plan. CLINICAL IMPRESSION:  1. Ureterolithiasis        DISPOSITION Decision To Discharge 07/08/2019 09:14:19 PM      PATIENT REFERRED TO:  Alley Antoine MD  [address] [address] [address]  [phone]    Schedule an appointment as soon as possible for a visit       Chrisandra Kehr, [address]  [address] [facility]  [phone]    Schedule an appointment as soon as possible for a visit       [facility] Emergency Department  [address] [address]  [phone]  Go to   As needed      DISCHARGE MEDICATIONS:  New Prescriptions    HYDROCODONE-ACETAMINOPHEN (NORCO) 5-325 MG PER TABLET    Take 1 tablet by mouth every 6 hours as needed for Pain for up to 3 days.     IBUPROFEN (ADVIL;MOTRIN) 800 MG TABLET    Take 1 tablet by mouth every 8 hours as needed for Pain    TAMSULOSIN (FLOMAX) 0.4 MG CAPSULE    Take 1 capsule by mouth daily for 6 days       DISCONTINUED MEDICATIONS:  Discontinued Medications    No medications on file              (Please note the MDM and HPI sections of this note were completed with a voice recognition program.  Efforts weremade to edit the dictations but occasionally words are mis-transcribed.)    Electronically signed, RODRICK Rodriguez CNP,           RODRICK Rodriguez CNP  07/08/19 [id number]

## 2019-07-08 NOTE — ED TRIAGE NOTES
Patient presents to the ED complaining of right sided abdominal pain/nausea/vomiting onset today. Denies fevers, injury, diarrhea/constipation, or urinary symptoms. Patient alert and oriented, respirations even and easy. Patient placed on continuous telemetry and pulse oximetry.

## 2019-07-09 ENCOUNTER — TELEPHONE (OUTPATIENT)
Dept: INTERNAL MEDICINE CLINIC | Age: 74
End: 2019-07-09

## 2019-07-10 ENCOUNTER — TELEPHONE (OUTPATIENT)
Dept: INTERNAL MEDICINE CLINIC | Age: 74
End: 2019-07-10

## 2019-09-27 ENCOUNTER — OFFICE VISIT (OUTPATIENT)
Dept: INTERNAL MEDICINE CLINIC | Age: 74
End: 2019-09-27
Payer: MEDICARE

## 2019-09-27 VITALS
BODY MASS INDEX: 29.92 KG/M2 | WEIGHT: 162.6 LBS | HEIGHT: 62 IN | OXYGEN SATURATION: 99 % | SYSTOLIC BLOOD PRESSURE: 154 MMHG | DIASTOLIC BLOOD PRESSURE: 82 MMHG | HEART RATE: 73 BPM

## 2019-09-27 DIAGNOSIS — I10 ESSENTIAL HYPERTENSION: ICD-10-CM

## 2019-09-27 DIAGNOSIS — E11.9 TYPE 2 DIABETES MELLITUS WITHOUT COMPLICATION, WITHOUT LONG-TERM CURRENT USE OF INSULIN (HCC): Primary | ICD-10-CM

## 2019-09-27 PROCEDURE — 99213 OFFICE O/P EST LOW 20 MIN: CPT | Performed by: INTERNAL MEDICINE

## 2019-09-27 ASSESSMENT — ENCOUNTER SYMPTOMS
ABDOMINAL PAIN: 0
SHORTNESS OF BREATH: 0
CHEST TIGHTNESS: 0
BACK PAIN: 0
NAUSEA: 0
COUGH: 0
EYE REDNESS: 0

## 2019-09-27 NOTE — PROGRESS NOTES
Subjective:      Patient ID: Vita Fernandez is a 69 y.o. female    Chief Complaint   Patient presents with    Hypertension     follow up    Diabetes     follow up    Medication Reaction     Patient states that she had reaction to hydrochlorothiazide, and losartan, has concerns wants to discuss       Hypertension   This is a chronic problem. The current episode started more than 1 year ago. The problem is controlled (Her home blood pressures are 120/78). Pertinent negatives include no chest pain, headaches, neck pain, peripheral edema or shortness of breath. Past treatments include calcium channel blockers, diuretics and angiotensin blockers. The current treatment provides significant improvement. There are no compliance problems. Diabetes   She presents for her follow-up diabetic visit. She has type 2 diabetes mellitus. Hypoglycemia symptoms include nervousness/anxiousness. Pertinent negatives for hypoglycemia include no dizziness or headaches. Pertinent negatives for diabetes include no chest pain, no fatigue, no polydipsia, no polyuria, no weakness and no weight loss. Pertinent negatives for hypoglycemia complications include no nocturnal hypoglycemia. Symptoms are stable. Current diabetic treatment includes oral agent (monotherapy). She is compliant with treatment most of the time. Weight trend: decreased a little. She is following a generally healthy diet. An ACE inhibitor/angiotensin II receptor blocker is being taken.        Current Outpatient Medications on File Prior to Visit   Medication Sig Dispense Refill    ibuprofen (ADVIL;MOTRIN) 800 MG tablet Take 1 tablet by mouth every 8 hours as needed for Pain 30 tablet 0    pioglitazone (ACTOS) 15 MG tablet Take 1 tablet by mouth daily To help control blood sugar 90 tablet 3    amLODIPine (NORVASC) 10 MG tablet TAKE 1 TABLET BY MOUTH EVERY DAY 90 tablet 3    hydrochlorothiazide (MICROZIDE) 12.5 MG capsule TAKE 1 CAPSULE BY MOUTH EVERY MORNING 90 capsule 3 Future  - Hemoglobin A1C; Future    2. Essential hypertension  Stable. [address] hypertension? Continue current medicine. Continue home blood pressure checking.

## 2020-03-09 ENCOUNTER — TELEPHONE (OUTPATIENT)
Dept: INTERNAL MEDICINE CLINIC | Age: 75
End: 2020-03-09

## 2020-03-09 NOTE — TELEPHONE ENCOUNTER
Patient called stating that she is taking :     hydrochlorothiazide (MICROZIDE) 12.5 MG capsule [[id number]    It was the blue and white capsule and now they changed it to the blue capsule and it is not sitting right with her, she has headaches, her heart flutters.       Please call to advise     Cindi Kelsey [address] [place] [address], [address] [address] [facility]

## 2020-03-09 NOTE — TELEPHONE ENCOUNTER
She may have already done this but could she check with the pharmacy to see if they have the other blue and white pill available, or have it at another store.

## 2020-03-10 RX ORDER — HYDROCHLOROTHIAZIDE 12.5 MG/1
CAPSULE, GELATIN COATED ORAL
Qty: 90 CAPSULE | Refills: 3 | Status: SHIPPED | OUTPATIENT
Start: 2020-03-10 | End: 2020-06-01 | Stop reason: RX

## 2020-03-31 ENCOUNTER — VIRTUAL VISIT (OUTPATIENT)
Dept: INTERNAL MEDICINE CLINIC | Age: 75
End: 2020-03-31
Payer: MEDICARE

## 2020-03-31 VITALS
TEMPERATURE: 97.8 F | HEART RATE: 57 BPM | HEIGHT: 62 IN | DIASTOLIC BLOOD PRESSURE: 86 MMHG | BODY MASS INDEX: 29.44 KG/M2 | SYSTOLIC BLOOD PRESSURE: 136 MMHG | WEIGHT: 160 LBS

## 2020-03-31 PROCEDURE — G0439 PPPS, SUBSEQ VISIT: HCPCS | Performed by: INTERNAL MEDICINE

## 2020-03-31 ASSESSMENT — PATIENT HEALTH QUESTIONNAIRE - PHQ9
SUM OF ALL RESPONSES TO PHQ QUESTIONS 1-9: 1
2. FEELING DOWN, DEPRESSED OR HOPELESS: 1
SUM OF ALL RESPONSES TO PHQ QUESTIONS 1-9: 1
SUM OF ALL RESPONSES TO PHQ9 QUESTIONS 1 & 2: 1
1. LITTLE INTEREST OR PLEASURE IN DOING THINGS: 0

## 2020-03-31 NOTE — PROGRESS NOTES
Subjective:      Patient ID: Anna Hernandez is a 69 y.o. female    Chief Complaint   Patient presents with    Medicare AWV       HPI    Current Outpatient Medications on File Prior to Visit   Medication Sig Dispense Refill    hydrochlorothiazide (MICROZIDE) 12.5 MG capsule TAKE 1 CAPSULE BY MOUTH EVERY MORNING 90 capsule 3    amLODIPine (NORVASC) 10 MG tablet TAKE 1 TABLET BY MOUTH EVERY DAY 90 tablet 3    losartan (COZAAR) 50 MG tablet TAKE 1 TABLET BY MOUTH DAILY 90 tablet 3    Naproxen Sodium (ALEVE) 220 MG CAPS Take 1 tablet by mouth daily as needed (back or neck pain)      tamsulosin (FLOMAX) 0.4 MG capsule Take 1 capsule by mouth daily for 6 days 6 capsule 0    ibuprofen (ADVIL;MOTRIN) 800 MG tablet Take 1 tablet by mouth every 8 hours as needed for Pain (Patient not taking: Reported on 3/31/2020) 30 tablet 0    pioglitazone (ACTOS) 15 MG tablet Take 1 tablet by mouth daily To help control blood sugar (Patient not taking: Reported on 3/31/2020) 90 tablet 3     No current facility-administered medications on file prior to visit. Allergies   Allergen Reactions    Cephalosporins     Penicillins     Sulfa Antibiotics        Review of Systems    Objective:   Physical Exam    Assessment and plan       1. Well adult exam  2. Essential hypertension  3. Type 2 diabetes mellitus without complication, without long-term current use of insulin ([facility] [address]  4. Mixed hyperlipidemia                     Medicare Annual Wellness Visit  Name: Tameka Wood Date: 3/31/2020   MRN: <[id number]> Sex: Female   Age: 69 y.o. Ethnicity: Non-/Non    : 1945 Race: Shemarlatesha Stanford is here for Medicare AWV    Screenings for behavioral, psychosocial and functional/safety risks, and cognitive dysfunction are all negative except as indicated below. These results, as well as other patient data from the [address] form, are documented in Flowsheets linked to this Encounter.     Allergies Allergen Reactions    Cephalosporins     Penicillins     Sulfa Antibiotics          Prior to Visit Medications    Medication Sig Taking?  Authorizing Provider   hydrochlorothiazide (MICROZIDE) 12.5 MG capsule TAKE 1 CAPSULE BY MOUTH EVERY MORNING Yes Graham Boyd MD   amLODIPine (NORVASC) 10 MG tablet TAKE 1 TABLET BY MOUTH EVERY DAY Yes Doris Kay MD   losartan (COZAAR) 50 MG tablet TAKE 1 TABLET BY MOUTH DAILY Yes Doris Kay MD   Naproxen Sodium (ALEVE) 220 MG CAPS Take 1 tablet by mouth daily as needed (back or neck pain) Yes Historical Provider, MD   tamsulosin (FLOMAX) 0.4 MG capsule Take 1 capsule by mouth daily for 6 days  RODRICK España CNP   ibuprofen (ADVIL;MOTRIN) 800 MG tablet Take 1 tablet by mouth every 8 hours as needed for Pain  Patient not taking: Reported on 3/31/2020  RODRICK España CNP   pioglitazone (ACTOS) 15 MG tablet Take 1 tablet by mouth daily To help control blood sugar  Patient not taking: Reported on 3/31/2020  Doris Kay MD         Past Medical History:   Diagnosis Date    Back pain 6/15/2010    Herniated disc 06/01/2000    Chiropractor Dr Reynaldo Sumner, lumbar , left     Hyperlipidemia     Hypertension     Palpitations 6/15/2010       Past Surgical History:   Procedure Laterality Date    COLONOSCOPY  2011    Rina Turk         Family History   Problem Relation Age of Onset    Hypertension Other     Hypertension Sister     Diabetes Sister     Breast Cancer Sister     Diabetes Maternal Aunt        CareTeam (Including outside providers/suppliers regularly involved in providing care):   Patient Care Team:  Doris Kay MD as PCP - General (Internal Medicine)  Doris Kay MD as PCP [facility] Empaneled Provider    Wt Readings from Last 3 Encounters:   03/31/20 160 lb (72.6 kg)   09/27/19 162 lb 9.6 oz (73.8 kg)   07/08/19 163 lb 5.8 oz (74.1 kg)     Vitals:    03/31/20 0950   BP: 136/86   Pulse: 57   Temp: 97.8 °F (36.6 °C)   Weight: 160 lb (72.6 kg)   Height: 5' 2\" (1.575 m)     Body mass index is 29.26 kg/m². Based upon direct observation of the patient, evaluation of cognition reveals recent and remote memory intact. General Appearance: alert and oriented to person, place and time, well developed and well- nourished, in no acute distress  Skin: warm and dry, no rash or erythema  Head: normocephalic and atraumatic  Eyes: extraocular eye movements intact, conjunctivae normal  ENT: external ear normal bilaterally, nose without deformity  Neck: supple and non-tender without mass, no thyromegaly or thyroid nodules, no cervical lymphadenopathy  Pulmonary/Chest:  normal air movement, no respiratory distress  Extremities: no edema  Musculoskeletal: normal range of motion, no joint swelling, deformity or tenderness  Neurologic: no cranial nerve deficit, coordination and speech normal    Patient's complete Health Risk Assessment and screening values have been reviewed and are found in Flowsheets. The following problems were reviewed today and where indicated follow up appointments were made and/or referrals ordered. Positive Risk Factor Screenings with Interventions:     No Positive Risk Factors identified today. Personalized Preventive Plan   Current Health Maintenance Status    There is no immunization history on file for this patient.      Health Maintenance   Topic Date Due    Hepatitis C screen  1945    Diabetic retinal exam  04/27/1957    Diabetic microalbuminuria test  04/27/1965    DTaP/Tdap/Td vaccine (1 - Tdap) 04/27/1966    Shingles Vaccine (1 of 2) 04/27/1997    Pneumococcal 65+ years Vaccine (1 of 1 - PPSV23) 04/27/2012    Breast cancer screen  04/18/2019    Annual Wellness Visit (AWV)  05/29/2019    Flu vaccine (1) 09/01/2019    Diabetic foot exam  09/17/2019    A1C test (Diabetic or Prediabetic)  03/19/2020    Lipid screen  03/19/2020    Potassium monitoring  07/08/2020    Creatinine monitoring  07/08/2020    Colon cancer screen

## 2020-06-01 ENCOUNTER — TELEPHONE (OUTPATIENT)
Dept: INTERNAL MEDICINE CLINIC | Age: 75
End: 2020-06-01

## 2020-06-01 RX ORDER — HYDROCHLOROTHIAZIDE 12.5 MG/1
12.5 TABLET ORAL DAILY
Qty: 90 TABLET | Refills: 3 | Status: SHIPPED | OUTPATIENT
Start: 2020-06-01 | End: 2021-05-03

## 2020-07-16 RX ORDER — LOSARTAN POTASSIUM 50 MG/1
TABLET ORAL
Qty: 90 TABLET | Refills: 3 | Status: SHIPPED | OUTPATIENT
Start: 2020-07-16 | End: 2021-07-21

## 2020-09-08 ENCOUNTER — TELEPHONE (OUTPATIENT)
Dept: INTERNAL MEDICINE CLINIC | Age: 75
End: 2020-09-08

## 2020-09-08 NOTE — TELEPHONE ENCOUNTER
Patient would like to know what vitamins she is allowed to take with her other medications. Please advise.

## 2020-09-22 ENCOUNTER — VIRTUAL VISIT (OUTPATIENT)
Dept: INTERNAL MEDICINE CLINIC | Age: 75
End: 2020-09-22
Payer: MEDICARE

## 2020-09-22 PROCEDURE — 99214 OFFICE O/P EST MOD 30 MIN: CPT | Performed by: INTERNAL MEDICINE

## 2020-09-22 SDOH — ECONOMIC STABILITY: FOOD INSECURITY: WITHIN THE PAST 12 MONTHS, THE FOOD YOU BOUGHT JUST DIDN'T LAST AND YOU DIDN'T HAVE MONEY TO GET MORE.: NEVER TRUE

## 2020-09-22 SDOH — ECONOMIC STABILITY: INCOME INSECURITY: HOW HARD IS IT FOR YOU TO PAY FOR THE VERY BASICS LIKE FOOD, HOUSING, MEDICAL CARE, AND HEATING?: NOT HARD AT ALL

## 2020-09-22 SDOH — ECONOMIC STABILITY: TRANSPORTATION INSECURITY
IN THE PAST 12 MONTHS, HAS LACK OF TRANSPORTATION KEPT YOU FROM MEETINGS, WORK, OR FROM GETTING THINGS NEEDED FOR DAILY LIVING?: NO

## 2020-09-22 SDOH — ECONOMIC STABILITY: FOOD INSECURITY: WITHIN THE PAST 12 MONTHS, YOU WORRIED THAT YOUR FOOD WOULD RUN OUT BEFORE YOU GOT MONEY TO BUY MORE.: NEVER TRUE

## 2020-09-22 SDOH — ECONOMIC STABILITY: TRANSPORTATION INSECURITY
IN THE PAST 12 MONTHS, HAS THE LACK OF TRANSPORTATION KEPT YOU FROM MEDICAL APPOINTMENTS OR FROM GETTING MEDICATIONS?: NO

## 2020-09-22 ASSESSMENT — ENCOUNTER SYMPTOMS
ABDOMINAL PAIN: 0
EYE REDNESS: 0
COUGH: 0
NAUSEA: 0
CHEST TIGHTNESS: 0
SHORTNESS OF BREATH: 0
BACK PAIN: 0

## 2020-09-22 NOTE — PROGRESS NOTES
Subjective:      Patient ID: Vanessa Stock is a 70 y.o. female    Chief Complaint   Patient presents with    Hypertension    Diabetes       Hypertension   This is a chronic problem. The current episode started more than 1 year ago. The problem is controlled. Pertinent negatives include no chest pain, headaches, neck pain, peripheral edema or shortness of breath. Past treatments include angiotensin blockers, beta blockers and diuretics. The current treatment provides significant improvement. There are no compliance problems. Diabetes   She presents for her follow-up diabetic visit. She has type 2 diabetes mellitus. Hypoglycemia symptoms include nervousness/anxiousness. Pertinent negatives for hypoglycemia include no dizziness or headaches. Pertinent negatives for diabetes include no chest pain, no fatigue, no polydipsia, no polyuria, no weakness and no weight loss. There are no hypoglycemic complications. Symptoms are stable. Current diabetic treatment includes diet and oral agent (monotherapy). She is compliant with treatment most of the time. Her weight is stable. An ACE inhibitor/angiotensin II receptor blocker is being taken. Hyperlipidemia   This is a chronic problem. The current episode started more than 1 year ago. The problem is controlled. Recent lipid tests were reviewed and are variable. Exacerbating diseases include diabetes and obesity. Pertinent negatives include no chest pain or shortness of breath. Current antihyperlipidemic treatment includes diet change. The current treatment provides moderate improvement of lipids. Compliance problems include medication side effects.         Current Outpatient Medications on File Prior to Visit   Medication Sig Dispense Refill    losartan (COZAAR) 50 MG tablet TAKE 1 TABLET BY MOUTH DAILY 90 tablet 3    hydrochlorothiazide (HYDRODIURIL) 12.5 MG tablet Take 1 tablet by mouth daily 90 tablet 3    amLODIPine (NORVASC) 10 MG tablet TAKE 1 TABLET BY MOUTH EVERY Mood and Affect: Mood normal.         Behavior: Behavior normal.         Assessment and plan       1. Essential hypertension  Stable. Continue current medicine. Check electrolytes and creatinine.  - Comprehensive Metabolic Panel; Future    2. Mixed hyperlipidemia  Stable. Check labs. - Comprehensive Metabolic Panel; Future  - Lipid Panel; Future  - TSH with Reflex; Future    3. Type 2 diabetes mellitus without complication, without long-term current use of insulin (HCC)  Stable. See orders. Check labs. Adjust therapy. - Comprehensive Metabolic Panel; Future  - Hemoglobin A1C; Future    Ish Cloud is a 70 y.o. female being evaluated by a Virtual Visit (video visit) encounter to address concerns as mentioned above. A caregiver was present when appropriate. Due to this being a TeleHealth encounter ([facility]), evaluation of the following organ systems was limited: Vitals/Constitutional/EENT/Resp/CV/GI//MS/Neuro/Skin/Heme-Lymph-Imm. Pursuant to the emergency declaration under the [address] [place] [address] authority and the [employer] and [employer] Act, this Virtual Visit was conducted with patient's (and/or legal guardian's) consent, to reduce the patient's risk of exposure to COVID-19 and provide necessary medical care. The patient (and/or legal guardian) has also been advised to contact this office for worsening conditions or problems, and seek emergency medical treatment and/or call 911 if deemed necessary. Patient identification was verified at the start of the visit: Yes    Total time spent for this encounter: Not billed by time    Services were provided through a video synchronous discussion virtually to substitute for in-person clinic visit. Patient and provider were located at their individual homes.     --Marilyn Gerard MD on 9/22/2020 at 12:34 PM    An electronic signature was used to authenticate this note.

## 2020-09-28 ENCOUNTER — TELEPHONE (OUTPATIENT)
Dept: INTERNAL MEDICINE CLINIC | Age: 75
End: 2020-09-28

## 2020-09-28 RX ORDER — AZITHROMYCIN 250 MG/1
250 TABLET, FILM COATED ORAL SEE ADMIN INSTRUCTIONS
Qty: 6 TABLET | Refills: 0 | Status: SHIPPED | OUTPATIENT
Start: 2020-09-28 | End: 2021-04-02

## 2020-09-28 NOTE — TELEPHONE ENCOUNTER
Med sent to her pharmacy.        Orders Placed This Encounter   Medications    azithromycin (ZITHROMAX) 250 MG tablet     Sig: Take 1 tablet by mouth See Admin Instructions Take 2 tablets at once on the first day , then one tablet daily till all are gone     Dispense:  6 tablet     Refill:  0

## 2020-09-28 NOTE — TELEPHONE ENCOUNTER
Patient is requesting to have something called in due to having a few symptoms of a sinus infection or a possible head cold. Patient has clear mucus and runs to the back of throat. Patient has a small head ache side of her head. Patient has continued to eat and drink normally. Edd Oliver, Deepa Boo [address]  Departments: [address] [place] [address] · [facility] Photo  Hours:   Open 24 hours  Pharmacy: Open ? Closes 1:30AM · More hours  Phone: [phone]      Please call and advise.

## 2020-10-13 RX ORDER — ROSUVASTATIN CALCIUM 5 MG/1
5 TABLET, COATED ORAL DAILY
Qty: 90 TABLET | Refills: 3 | Status: SHIPPED | OUTPATIENT
Start: 2020-10-13 | End: 2021-04-02 | Stop reason: ALTCHOICE

## 2020-12-21 ENCOUNTER — TELEPHONE (OUTPATIENT)
Dept: PHARMACY | Facility: CLINIC | Age: 75
End: 2020-12-21

## 2020-12-22 NOTE — TELEPHONE ENCOUNTER
Verified with staff at [facility] filled last in July 2020 and billed to [facility]#[id number].

## 2020-12-28 NOTE — TELEPHONE ENCOUNTER
Noted confirmed  of ACE/ARB in 2020 through insurance - thank you!     For Pharmacy Admin Tracking Only    PHSO: Yes  Recommended intervention potential cost savings: 1  Time Spent (min): 1940 Vito Padron, PharmD  [address]

## 2021-03-12 DIAGNOSIS — I10 ESSENTIAL HYPERTENSION: ICD-10-CM

## 2021-03-15 RX ORDER — AMLODIPINE BESYLATE 10 MG/1
TABLET ORAL
Qty: 90 TABLET | Refills: 3 | Status: SHIPPED | OUTPATIENT
Start: 2021-03-15 | End: 2021-07-21 | Stop reason: SDUPTHER

## 2021-04-02 ENCOUNTER — OFFICE VISIT (OUTPATIENT)
Dept: INTERNAL MEDICINE CLINIC | Age: 76
End: 2021-04-02
Payer: MEDICARE

## 2021-04-02 VITALS
WEIGHT: 163 LBS | DIASTOLIC BLOOD PRESSURE: 78 MMHG | SYSTOLIC BLOOD PRESSURE: 126 MMHG | HEART RATE: 73 BPM | HEIGHT: 62 IN | OXYGEN SATURATION: 99 % | TEMPERATURE: 97.1 F | BODY MASS INDEX: 30 KG/M2

## 2021-04-02 DIAGNOSIS — F41.9 ANXIETY: ICD-10-CM

## 2021-04-02 DIAGNOSIS — E78.2 MIXED HYPERLIPIDEMIA: ICD-10-CM

## 2021-04-02 DIAGNOSIS — R53.83 OTHER FATIGUE: ICD-10-CM

## 2021-04-02 DIAGNOSIS — M54.50 CHRONIC BILATERAL LOW BACK PAIN WITHOUT SCIATICA: ICD-10-CM

## 2021-04-02 DIAGNOSIS — G89.29 CHRONIC BILATERAL LOW BACK PAIN WITHOUT SCIATICA: ICD-10-CM

## 2021-04-02 DIAGNOSIS — Z00.00 ROUTINE GENERAL MEDICAL EXAMINATION AT A HEALTH CARE FACILITY: Primary | ICD-10-CM

## 2021-04-02 DIAGNOSIS — E11.9 TYPE 2 DIABETES MELLITUS WITHOUT COMPLICATION, WITHOUT LONG-TERM CURRENT USE OF INSULIN (HCC): ICD-10-CM

## 2021-04-02 DIAGNOSIS — I10 ESSENTIAL HYPERTENSION: ICD-10-CM

## 2021-04-02 PROCEDURE — G0439 PPPS, SUBSEQ VISIT: HCPCS | Performed by: INTERNAL MEDICINE

## 2021-04-02 RX ORDER — PRAVASTATIN SODIUM 20 MG
20 TABLET ORAL DAILY
Qty: 90 TABLET | Refills: 1 | Status: SHIPPED | OUTPATIENT
Start: 2021-04-02 | End: 2021-10-04

## 2021-04-02 ASSESSMENT — LIFESTYLE VARIABLES: HOW OFTEN DO YOU HAVE A DRINK CONTAINING ALCOHOL: 0

## 2021-04-02 ASSESSMENT — PATIENT HEALTH QUESTIONNAIRE - PHQ9
SUM OF ALL RESPONSES TO PHQ9 QUESTIONS 1 & 2: 0
SUM OF ALL RESPONSES TO PHQ QUESTIONS 1-9: 0

## 2021-04-02 NOTE — LETTER
[facility] [address]  [address] [address]  Phone: [phone]  Fax: [phone]    Yo Us MD        April 2, 2021     Patient: Johnney Boas   YOB: 1945   Date of Visit: 4/2/2021       To Whom It May Concern: It is my medical opinion that Johnney Boas requires a disability parking placard for the following reasons:  She has limited walking ability due to an orthopedic condition. Duration of need: 5 years    If you have any questions or concerns, please don't hesitate to call.     Sincerely,        Yo Us MD

## 2021-04-02 NOTE — PROGRESS NOTES
Encounters:   04/02/21 163 lb (73.9 kg)   03/31/20 160 lb (72.6 kg)   09/27/19 162 lb 9.6 oz (73.8 kg)     Vitals:    04/02/21 1000 04/02/21 1003   BP: (!) 130/90 (!) 142/92   Pulse: 73    Temp: 97.1 °F (36.2 °C)    SpO2: 99%    Weight: 163 lb (73.9 kg)    Height: 5' 2\" (1.575 m)      Body mass index is 29.81 kg/m². Based upon direct observation of the patient, evaluation of cognition reveals recent and remote memory intact. General Appearance: alert and oriented to person, place and time, well developed and well- nourished, in no acute distress  Skin: warm and dry, no rash or erythema  Head: normocephalic and atraumatic  Eyes: pupils equal, round, and reactive to light, extraocular eye movements intact, conjunctivae normal  ENT: tympanic membrane, external ear and ear canal normal bilaterally, nose without deformity, nasal mucosa and turbinates normal without polyps  Neck: supple and non-tender without mass, no thyromegaly or thyroid nodules, no cervical lymphadenopathy  Pulmonary/Chest: clear to auscultation bilaterally- no wheezes, rales or rhonchi, normal air movement, no respiratory distress  Cardiovascular: normal rate, regular rhythm, normal S1 and S2, no murmurs, rubs, clicks, or gallops, distal pulses intact, no carotid bruits  Abdomen: soft, non-tender, non-distended, normal bowel sounds, no masses or organomegaly  Extremities: no cyanosis, clubbing or edema  Musculoskeletal: normal range of motion, no joint swelling, deformity or tenderness  Neurologic: reflexes normal and symmetric, no cranial nerve deficit, gait, coordination and speech normal    Patient's complete Health Risk Assessment and screening values have been reviewed and are found in Flowsheets. The following problems were reviewed today and where indicated follow up appointments were made and/or referrals ordered.     Positive Risk Factor Screenings with Interventions:            General Health and ACP:  General  In general, how would you say your health is?: Good  In the past 7 days, have you experienced any of the following? New or Increased Pain, New or Increased Fatigue, Loneliness, Social Isolation, Stress or Anger?: None of These  Do you get the social and emotional support that you need?: (!) No  Do you have a Living Will?: (!) No  Advance Directives     Power of Sebastien Batres Will ACP-Advance Directive ACP-Power of     Not on File Not on File Not on File Not on File      General Health Risk Interventions:  · No Living Will: Advance Care Planning addressed with patient today   · She does talk with her family daily     Health Habits/Nutrition:  Health Habits/Nutrition  Do you exercise for at least 20 minutes 2-3 times per week?: Yes  Have you lost any weight without trying in the past 3 months?: No  Do you eat only one meal per day?: (!) Yes  Have you seen the dentist within the past year?: Yes  Body mass index: (!) 29.81  Health Habits/Nutrition Interventions:  · Nutritional issues:  educational materials for healthy, well-balanced diet provided       Personalized Preventive Plan   Current Health Maintenance Status    There is no immunization history on file for this patient.      Health Maintenance   Topic Date Due    Hepatitis C screen  Never done    Diabetic retinal exam  Never done    COVID-19 Vaccine (1) Never done    Diabetic microalbuminuria test  Never done    DTaP/Tdap/Td vaccine (1 - Tdap) Never done    Shingles Vaccine (1 of 2) Never done    Pneumococcal 65+ years Vaccine (1 of 1 - PPSV23) Never done    Breast cancer screen  04/18/2019    Diabetic foot exam  09/17/2019    Annual Wellness Visit (AWV)  04/01/2021    Colon cancer screen colonoscopy  08/23/2021    Flu vaccine (Season Ended) 09/01/2021    A1C test (Diabetic or Prediabetic)  10/10/2021    Lipid screen  10/10/2021    Potassium monitoring  10/10/2021    Creatinine monitoring  10/10/2021    DEXA (modify frequency per FRAX score)  Completed    CBC Auto Differential; Future

## 2021-04-02 NOTE — PATIENT INSTRUCTIONS
Personalized Preventive Plan for Ramila Belle - 4/2/2021  Medicare offers a range of preventive health benefits. Some of the tests and screenings are paid in full while other may be subject to a deductible, co-insurance, and/or copay. Some of these benefits include a comprehensive review of your medical history including lifestyle, illnesses that may run in your family, and various assessments and screenings as appropriate. After reviewing your medical record and screening and assessments performed today your provider may have ordered immunizations, labs, imaging, and/or referrals for you. A list of these orders (if applicable) as well as your Preventive Care list are included within your After Visit Summary for your review. Other Preventive Recommendations:    · A preventive eye exam performed by an eye specialist is recommended every 1-2 years to screen for glaucoma; cataracts, macular degeneration, and other eye disorders. · A preventive dental visit is recommended every 6 months. · Try to get at least 150 minutes of exercise per week or 10,000 steps per day on a pedometer . · Order or download the FREE \"Exercise & Physical Activity: Your Everyday Guide\" from [employer]. Call 3-814.864.6642 or search [employer] online. · You need 9832-4348 mg of calcium and 0537-4648 IU of vitamin D per day. It is possible to meet your calcium requirement with diet alone, but a vitamin D supplement is usually necessary to meet this goal.  · When exposed to the sun, use a sunscreen that protects against both UVA and UVB radiation with an SPF of 30 or greater. Reapply every 2 to 3 hours or after sweating, drying off with a towel, or swimming. · Always wear a seat belt when traveling in a car. Always wear a helmet when riding a bicycle or motorcycle.

## 2021-04-23 ENCOUNTER — HOSPITAL ENCOUNTER (OUTPATIENT)
Dept: MAMMOGRAPHY | Age: 76
Discharge: HOME | End: 2021-04-23
Payer: MEDICARE

## 2021-04-23 VITALS — WEIGHT: 167 LBS | BODY MASS INDEX: 30.73 KG/M2 | HEIGHT: 62 IN

## 2021-04-23 DIAGNOSIS — Z12.31 VISIT FOR SCREENING MAMMOGRAM: ICD-10-CM

## 2021-04-23 PROCEDURE — 77067 SCR MAMMO BI INCL CAD: CPT

## 2021-05-01 DIAGNOSIS — I10 ESSENTIAL HYPERTENSION: ICD-10-CM

## 2021-05-03 RX ORDER — HYDROCHLOROTHIAZIDE 12.5 MG/1
12.5 TABLET ORAL DAILY
Qty: 90 TABLET | Refills: 3 | Status: SHIPPED | OUTPATIENT
Start: 2021-05-03 | End: 2021-07-21 | Stop reason: SINTOL

## 2021-06-03 ENCOUNTER — TELEPHONE (OUTPATIENT)
Dept: INTERNAL MEDICINE CLINIC | Age: 76
End: 2021-06-03

## 2021-06-03 NOTE — TELEPHONE ENCOUNTER
DARIO  Per Dr. Radhika So instructions pt was called and notified of results and Dr. Radhika So recommendations:  Please call, blood sugar high at 141. A1C is ok at 7.2. Cholesterol high at 235. Take her Pravastatin- Cholesterol medicine faithfully.

## 2021-06-03 NOTE — TELEPHONE ENCOUNTER
Patient would like to have lab results sent to her at her address listed on file   [address]      Please call to advise

## 2021-06-04 ENCOUNTER — TELEPHONE (OUTPATIENT)
Dept: INTERNAL MEDICINE CLINIC | Age: 76
End: 2021-06-04

## 2021-06-04 NOTE — TELEPHONE ENCOUNTER
Patient called in requesting a copy og her most recent labs be sent to her via mail. [address] [facility] [address] Givens advise.

## 2021-07-14 ENCOUNTER — NURSE TRIAGE (OUTPATIENT)
Dept: OTHER | Facility: CLINIC | Age: 76
End: 2021-07-14

## 2021-07-14 DIAGNOSIS — I10 ESSENTIAL HYPERTENSION: ICD-10-CM

## 2021-07-14 RX ORDER — AMLODIPINE BESYLATE 5 MG
TABLET ORAL
Qty: 90 TABLET | Refills: 3 | Status: CANCELLED | OUTPATIENT
Start: 2021-07-14

## 2021-07-14 NOTE — TELEPHONE ENCOUNTER
Pt called into office and requested Dr. Nadja Nguyen to speak with [facility] regarding two medications. She gave the following number. She gave no further details after asking for specification.      [facility] - [phone]

## 2021-07-14 NOTE — TELEPHONE ENCOUNTER
Received call from Linda Maloney at [facility] with [employer]. Brief description of triage:   Pt is calling with c/o chest pain. Pt disconnected the call prior to speaking with nurse triage. RN attempted to call the pt back on 3 different phone numbers that were listed in the pt's chart. Unfortunately, there was no answer. Care advice provided, patient verbalizes understanding; denies any other questions or concerns; instructed to call back for any new or worsening symptoms. Attention Provider: Thank you for allowing me to participate in the care of your patient. The patient was connected to triage in response to information provided to the [facility]. Please do not respond through this encounter as the response is not directed to a shared pool.     Reason for Disposition   Caller has cancelled the call before the first contact    Protocols used: NO CONTACT OR DUPLICATE CONTACT CALL-ADULT-[place]

## 2021-07-14 NOTE — TELEPHONE ENCOUNTER
Received call from Rosenda Martinez at [facility] with [employer] Complaint. Brief description of triage:   Pt is calling with c/o off and on chest pain for the last 2-3 days. She states that she did travel by car to [place] last week. Triage indicates for patient to go to the ED. Care advice provided, patient verbalizes understanding; denies any other questions or concerns; instructed to call back for any new or worsening symptoms. Attention Provider: Thank you for allowing me to participate in the care of your patient. The patient was connected to triage in response to information provided to the [facility]. Please do not respond through this encounter as the response is not directed to a shared pool. Reason for Disposition   Long-distance travel in past month (e.g., car, bus, train, plane; with trip lasting 6 or more hours)    Answer Assessment - Initial Assessment Questions  1. LOCATION: \"Where does it hurt? \"        \"Right where her heart is\"    2. RADIATION: \"Does the pain go anywhere else? \" (e.g., into neck, jaw, arms, back)      Denies     3. ONSET: \"When did the chest pain begin? \" (Minutes, hours or days)       2-3 days ago    4. PATTERN \"Does the pain come and go, or has it been constant since it started? \"  \"Does it get worse with exertion? \"       Comes and goes, usually occurs after she takes her mediation, denies pain currently     5. DURATION: \"How long does it last\" (e.g., seconds, minutes, hours)      Hours     6. SEVERITY: \"How bad is the pain? \"  (e.g., Scale 1-10; mild, moderate, or severe)     - MILD (1-3): doesn't interfere with normal activities      - MODERATE (4-7): interferes with normal activities or awakens from sleep     - SEVERE (8-10): excruciating pain, unable to do any normal activities      Denies pain currently       7. CARDIAC RISK FACTORS: \"Do you have any history of heart problems or risk factors for heart disease? \" (e.g., angina, prior heart attack; diabetes, high blood pressure, high cholesterol, smoker, or strong family history of heart disease)      High blood pressure     8. PULMONARY RISK FACTORS: \"Do you have any history of lung disease? \"  (e.g., blood clots in lung, asthma, emphysema, birth control pills)      Denies     9. CAUSE: \"What do you think is causing the chest pain? \"      Pt believes her generic Amlodipine is causing the pain    10. OTHER SYMPTOMS: \"Do you have any other symptoms? \" (e.g., dizziness, nausea, vomiting, sweating, fever, difficulty breathing, cough)        Nausea     11. PREGNANCY: \"Is there any chance you are pregnant? \" \"When was your last menstrual period? \"        N/a    Protocols used: CHEST PAIN-ADULT-[place]

## 2021-07-14 NOTE — TELEPHONE ENCOUNTER
Pt calling with concerns with the Amlodipine medication. Pt stated that the pharmacy keeps switching her pills (assuming ). Pt stated the pills used to be smaller and now they are larger and causing her stomach to be upset. Nausea. Pt would like to know if something could be done with the pharm so that she could have the old pills again.           Wanda Greene          [phone] pt phone

## 2021-07-14 NOTE — TELEPHONE ENCOUNTER
----- Message from Joan Winslow sent at 7/14/2021  4:02 PM EDT -----  Subject: Message to Provider    QUESTIONS  Information for Provider? Patient daughter Julian Ayala would like to know if   their could be a meeting Dr. Li Speaker, with her and the patient son to   discuss the patient's medications. Patient daughter would like a call back   in regards to this as soon as possible  ---------------------------------------------------------------------------  --------------  CALL BACK INFO  What is the best way for the office to contact you? OK to leave message on   voicemail  Preferred Call Back Phone Number? [phone]  ---------------------------------------------------------------------------  --------------  SCRIPT ANSWERS  Relationship to Patient? Other  Representative Name? [facility]  Is the Representative on the appropriate HIPAA document in Epic?  Yes

## 2021-07-16 NOTE — TELEPHONE ENCOUNTER
Pt called the office and said the amlodipine isn't doing what it's supposed to. When she didn't take it the day before, she was fine. She took it today and she is nauseous and has chest pain.       Please call to advise

## 2021-07-21 ENCOUNTER — OFFICE VISIT (OUTPATIENT)
Dept: INTERNAL MEDICINE CLINIC | Age: 76
End: 2021-07-21
Payer: MEDICARE

## 2021-07-21 VITALS
HEIGHT: 62 IN | SYSTOLIC BLOOD PRESSURE: 140 MMHG | OXYGEN SATURATION: 99 % | WEIGHT: 161 LBS | BODY MASS INDEX: 29.63 KG/M2 | DIASTOLIC BLOOD PRESSURE: 80 MMHG | HEART RATE: 66 BPM | TEMPERATURE: 96.8 F

## 2021-07-21 DIAGNOSIS — I10 ESSENTIAL HYPERTENSION: ICD-10-CM

## 2021-07-21 PROCEDURE — 99213 OFFICE O/P EST LOW 20 MIN: CPT | Performed by: INTERNAL MEDICINE

## 2021-07-21 RX ORDER — AMLODIPINE BESYLATE 10 MG/1
10 TABLET ORAL DAILY
Qty: 90 TABLET | Refills: 3 | Status: SHIPPED | OUTPATIENT
Start: 2021-07-21 | End: 2022-05-12

## 2021-07-21 RX ORDER — LOSARTAN POTASSIUM 25 MG/1
25 TABLET ORAL DAILY
Qty: 90 TABLET | Refills: 3 | Status: SHIPPED | OUTPATIENT
Start: 2021-07-21 | End: 2022-08-11

## 2021-07-21 ASSESSMENT — ENCOUNTER SYMPTOMS
BACK PAIN: 0
CHEST TIGHTNESS: 0
EYE REDNESS: 0
COUGH: 0
SHORTNESS OF BREATH: 0
NAUSEA: 0
ABDOMINAL PAIN: 0
WHEEZING: 1

## 2021-07-21 NOTE — PROGRESS NOTES
Subjective:      Patient ID: Kole Lopez is a 78 y.o. female    Chief Complaint   Patient presents with    Medication Problem      amlodipine     Other     BP track       Hypertension  This is a chronic problem. The current episode started more than 1 year ago. Condition status: Stephanie Steve has been concerned that the generic pills she gets from the pharmacy look different every month and she feels different and thinks she is getting side effects from the generics. she wants to get the brand pills to avoid all these side effects. Associated symptoms include anxiety. Pertinent negatives include no chest pain, headaches, neck pain, peripheral edema or shortness of breath. Past treatments include diuretics and calcium channel blockers. The current treatment provides significant improvement. There are no compliance problems. There is no history of kidney disease, heart failure or left ventricular hypertrophy. Current Outpatient Medications on File Prior to Visit   Medication Sig Dispense Refill    pravastatin (PRAVACHOL) 20 MG tablet Take 1 tablet by mouth daily 90 tablet 1    Naproxen Sodium (ALEVE) 220 MG CAPS Take 1 tablet by mouth daily as needed (back or neck pain)       No current facility-administered medications on file prior to visit. Allergies   Allergen Reactions    Cephalosporins     Penicillins     Sulfa Antibiotics        Review of Systems   Constitutional: Negative for fatigue, fever and unexpected weight change. HENT: Negative for congestion and hearing loss. Eyes: Negative for redness and visual disturbance. Respiratory: Positive for wheezing (occaisional wheezing ). Negative for cough, chest tightness and shortness of breath. Cardiovascular: Negative for chest pain and leg swelling. Gastrointestinal: Negative for abdominal pain and nausea. Periodic cramps   Endocrine: Negative for polydipsia and polyuria. Genitourinary: Negative for dysuria and frequency. Musculoskeletal: Negative for arthralgias, back pain and neck pain. Skin: Negative for rash and wound. Allergic/Immunologic: Negative for environmental allergies. Neurological: Negative for dizziness, weakness and headaches. Hematological: Negative for adenopathy. Does not bruise/bleed easily. Psychiatric/Behavioral: Negative for sleep disturbance. The patient is not nervous/anxious. Objective:   Physical Exam  Constitutional:       Appearance: Normal appearance. Eyes:      Extraocular Movements: Extraocular movements intact. Cardiovascular:      Rate and Rhythm: Normal rate and regular rhythm. Pulmonary:      Effort: Pulmonary effort is normal.      Breath sounds: Normal breath sounds. No wheezing. Skin:     General: Skin is warm and dry. Neurological:      Mental Status: She is alert and oriented to person, place, and time. Psychiatric:      Comments: Anxious           Assessment and plan       1. Essential hypertension  Jessie Bernardo wants to discontinue the hydrochlorothiazide. She thinks that is causing her side effects. I told her she can stop the medication but she should check her blood pressure. She is wanting also to take the brand name losartan and amlodipine. Prescriptions written for d.a.w. medications. - losartan (COZAAR) 25 MG tablet; Take 1 tablet by mouth daily DELVIN  Dispense: 90 tablet; Refill: 3  - amLODIPine (NORVASC) 10 MG tablet; Take 1 tablet by mouth daily DELVIN  Dispense: 90 tablet;  Refill: 3

## 2021-08-31 ENCOUNTER — TELEPHONE (OUTPATIENT)
Dept: INTERNAL MEDICINE CLINIC | Age: 76
End: 2021-08-31

## 2021-08-31 NOTE — TELEPHONE ENCOUNTER
HIPPA and Financial forms were completed on 4/2021 so does not need to complete again. These forms are due once or year or any changes that need to be made. Per patient no changes needed.

## 2021-08-31 NOTE — TELEPHONE ENCOUNTER
----- Message from Dai Reyes sent at 8/31/2021 10:19 AM EDT -----  Subject: Message to Provider    QUESTIONS  Information for Provider? Patient asking if her forms that she fills out   when she comes into office can be mailed to her so that she can fill them   out before her appointment. Please return her call to advise .  ---------------------------------------------------------------------------  --------------  CALL BACK INFO  What is the best way for the office to contact you? OK to leave message on   voicemail  Preferred Call Back Phone Number? [phone]  ---------------------------------------------------------------------------  --------------  SCRIPT ANSWERS  Relationship to Patient?  Self

## 2021-10-04 ENCOUNTER — OFFICE VISIT (OUTPATIENT)
Dept: INTERNAL MEDICINE CLINIC | Age: 76
End: 2021-10-04
Payer: MEDICARE

## 2021-10-04 VITALS
TEMPERATURE: 98.1 F | WEIGHT: 162.2 LBS | OXYGEN SATURATION: 100 % | SYSTOLIC BLOOD PRESSURE: 140 MMHG | DIASTOLIC BLOOD PRESSURE: 100 MMHG | HEIGHT: 62 IN | HEART RATE: 73 BPM | BODY MASS INDEX: 29.85 KG/M2

## 2021-10-04 DIAGNOSIS — R53.83 OTHER FATIGUE: ICD-10-CM

## 2021-10-04 DIAGNOSIS — E78.2 MIXED HYPERLIPIDEMIA: ICD-10-CM

## 2021-10-04 DIAGNOSIS — I10 ESSENTIAL HYPERTENSION: Primary | ICD-10-CM

## 2021-10-04 DIAGNOSIS — R73.9 HYPERGLYCEMIA: ICD-10-CM

## 2021-10-04 PROCEDURE — 99214 OFFICE O/P EST MOD 30 MIN: CPT | Performed by: INTERNAL MEDICINE

## 2021-10-04 SDOH — ECONOMIC STABILITY: FOOD INSECURITY: WITHIN THE PAST 12 MONTHS, THE FOOD YOU BOUGHT JUST DIDN'T LAST AND YOU DIDN'T HAVE MONEY TO GET MORE.: NEVER TRUE

## 2021-10-04 SDOH — ECONOMIC STABILITY: FOOD INSECURITY: WITHIN THE PAST 12 MONTHS, YOU WORRIED THAT YOUR FOOD WOULD RUN OUT BEFORE YOU GOT MONEY TO BUY MORE.: NEVER TRUE

## 2021-10-04 ASSESSMENT — ENCOUNTER SYMPTOMS
SHORTNESS OF BREATH: 0
BACK PAIN: 0
ABDOMINAL PAIN: 0
EYE REDNESS: 0
NAUSEA: 0
CHEST TIGHTNESS: 0
COUGH: 0

## 2021-10-04 ASSESSMENT — SOCIAL DETERMINANTS OF HEALTH (SDOH): HOW HARD IS IT FOR YOU TO PAY FOR THE VERY BASICS LIKE FOOD, HOUSING, MEDICAL CARE, AND HEATING?: NOT HARD AT ALL

## 2021-10-04 NOTE — PROGRESS NOTES
Subjective:      Patient ID: Kale Lunsford is a 78 y.o. female    Chief Complaint   Patient presents with    Hypertension     follow up       Hypertension  This is a chronic problem. The current episode started more than 1 year ago. Progression since onset: She has been checking her home blood pressure. She normally gets readings around 120/80. She has multiple readings and no elevated readings over the last 4 months. The problem is resistant. Pertinent negatives include no chest pain, headaches, neck pain, peripheral edema or shortness of breath. Past treatments include angiotensin blockers and calcium channel blockers. The current treatment provides significant improvement. Compliance problems include exercise and psychosocial issues. Hyperlipidemia  This is a chronic problem. Recent lipid tests were reviewed and are high. Pertinent negatives include no chest pain or shortness of breath. Treatments tried: the patient says she will not take the statin because it has too many potential side effects. The current treatment provides no improvement of lipids. Compliance problems include medication side effects. Current Outpatient Medications on File Prior to Visit   Medication Sig Dispense Refill    losartan (COZAAR) 25 MG tablet Take 1 tablet by mouth daily DELVIN 90 tablet 3    amLODIPine (NORVASC) 10 MG tablet Take 1 tablet by mouth daily DELVIN 90 tablet 3    Naproxen Sodium (ALEVE) 220 MG CAPS Take 1 tablet by mouth daily as needed (back or neck pain)       No current facility-administered medications on file prior to visit. Allergies   Allergen Reactions    Cephalosporins     Penicillins     Sulfa Antibiotics        Review of Systems   Constitutional: Negative for fatigue, fever and unexpected weight change. HENT: Negative for congestion and hearing loss. Eyes: Negative for redness and visual disturbance. Respiratory: Negative for cough, chest tightness and shortness of breath. Cardiovascular: Negative for chest pain and leg swelling. Gastrointestinal: Negative for abdominal pain and nausea. Endocrine: Negative for polydipsia and polyuria. Genitourinary: Negative for dysuria and frequency. Musculoskeletal: Negative for arthralgias, back pain and neck pain. Skin: Negative for rash and wound. Neurological: Negative for dizziness, weakness and headaches. Hematological: Negative for adenopathy. Does not bruise/bleed easily. Psychiatric/Behavioral: Negative for sleep disturbance. The patient is not nervous/anxious. Objective:   Physical Exam  Constitutional:       Appearance: Normal appearance. Eyes:      Extraocular Movements: Extraocular movements intact. Cardiovascular:      Rate and Rhythm: Normal rate and regular rhythm. Pulmonary:      Effort: Pulmonary effort is normal.      Breath sounds: No wheezing or rales. Musculoskeletal:      Right lower leg: No edema. Left lower leg: No edema. Neurological:      Mental Status: She is alert and oriented to person, place, and time. Psychiatric:      Comments: Anxiety          Assessment and plan       1. Essential hypertension  Stable. Continue on current blood pressure medicine. Continue to monitor blood pressures at home. - Comprehensive Metabolic Panel; Future    2. Mixed hyperlipidemia  Recheck lipids. She is trying to eat a healthy diet. - Comprehensive Metabolic Panel; Future  - Lipid Panel; Future  - TSH with Reflex; Future    3. Hyperglycemia  Stable. Check A1c.  - Hemoglobin A1C; Future    4. Other fatigue  Stable. Check labs.   - CBC Auto Differential; Future

## 2022-04-05 ENCOUNTER — OFFICE VISIT (OUTPATIENT)
Dept: INTERNAL MEDICINE CLINIC | Age: 77
End: 2022-04-05
Payer: MEDICARE

## 2022-04-05 VITALS
HEART RATE: 76 BPM | DIASTOLIC BLOOD PRESSURE: 82 MMHG | TEMPERATURE: 97.9 F | BODY MASS INDEX: 28.67 KG/M2 | WEIGHT: 161.8 LBS | OXYGEN SATURATION: 99 % | SYSTOLIC BLOOD PRESSURE: 170 MMHG | HEIGHT: 63 IN

## 2022-04-05 DIAGNOSIS — G89.29 CHRONIC RIGHT-SIDED LOW BACK PAIN WITHOUT SCIATICA: ICD-10-CM

## 2022-04-05 DIAGNOSIS — Z00.00 MEDICARE ANNUAL WELLNESS VISIT, SUBSEQUENT: Primary | ICD-10-CM

## 2022-04-05 DIAGNOSIS — R53.83 OTHER FATIGUE: ICD-10-CM

## 2022-04-05 DIAGNOSIS — F41.9 ANXIETY: ICD-10-CM

## 2022-04-05 DIAGNOSIS — M54.50 CHRONIC RIGHT-SIDED LOW BACK PAIN WITHOUT SCIATICA: ICD-10-CM

## 2022-04-05 DIAGNOSIS — I10 ESSENTIAL HYPERTENSION: ICD-10-CM

## 2022-04-05 DIAGNOSIS — E78.2 MIXED HYPERLIPIDEMIA: ICD-10-CM

## 2022-04-05 DIAGNOSIS — E11.9 TYPE 2 DIABETES MELLITUS WITHOUT COMPLICATION, WITHOUT LONG-TERM CURRENT USE OF INSULIN (HCC): ICD-10-CM

## 2022-04-05 LAB
A/G RATIO: 1.8 (ref 1.1–2.2)
ALBUMIN SERPL-MCNC: 5.1 G/DL (ref 3.4–5)
ALP BLD-CCNC: 109 U/L (ref 40–129)
ALT SERPL-CCNC: 23 U/L (ref 10–40)
ANION GAP SERPL CALCULATED.3IONS-SCNC: 15 MMOL/L (ref 3–16)
AST SERPL-CCNC: 21 U/L (ref 15–37)
BASOPHILS ABSOLUTE: 0 K/UL (ref 0–0.2)
BASOPHILS RELATIVE PERCENT: 1 %
BILIRUB SERPL-MCNC: 0.9 MG/DL (ref 0–1)
BUN BLDV-MCNC: 13 MG/DL (ref 7–20)
CALCIUM SERPL-MCNC: 10.3 MG/DL (ref 8.3–10.6)
CHLORIDE BLD-SCNC: 105 MMOL/L (ref 99–110)
CHOLESTEROL, TOTAL: 233 MG/DL (ref 0–199)
CO2: 21 MMOL/L (ref 21–32)
CREAT SERPL-MCNC: 0.9 MG/DL (ref 0.6–1.2)
EOSINOPHILS ABSOLUTE: 0 K/UL (ref 0–0.6)
EOSINOPHILS RELATIVE PERCENT: 1 %
GFR AFRICAN AMERICAN: >60
GFR NON-AFRICAN AMERICAN: >60
GLUCOSE BLD-MCNC: 154 MG/DL (ref 70–99)
HCT VFR BLD CALC: 44.6 % (ref 36–48)
HDLC SERPL-MCNC: 68 MG/DL (ref 40–60)
HEMOGLOBIN: 14.7 G/DL (ref 12–16)
LDL CHOLESTEROL CALCULATED: 151 MG/DL
LYMPHOCYTES ABSOLUTE: 1.7 K/UL (ref 1–5.1)
LYMPHOCYTES RELATIVE PERCENT: 36.8 %
MCH RBC QN AUTO: 29.5 PG (ref 26–34)
MCHC RBC AUTO-ENTMCNC: 32.9 G/DL (ref 31–36)
MCV RBC AUTO: 89.5 FL (ref 80–100)
MONOCYTES ABSOLUTE: 0.3 K/UL (ref 0–1.3)
MONOCYTES RELATIVE PERCENT: 6 %
NEUTROPHILS ABSOLUTE: 2.5 K/UL (ref 1.7–7.7)
NEUTROPHILS RELATIVE PERCENT: 55.2 %
PDW BLD-RTO: 15.3 % (ref 12.4–15.4)
PLATELET # BLD: 188 K/UL (ref 135–450)
PMV BLD AUTO: 9.4 FL (ref 5–10.5)
POTASSIUM SERPL-SCNC: 3.8 MMOL/L (ref 3.5–5.1)
RBC # BLD: 4.99 M/UL (ref 4–5.2)
SODIUM BLD-SCNC: 141 MMOL/L (ref 136–145)
TOTAL PROTEIN: 8 G/DL (ref 6.4–8.2)
TRIGL SERPL-MCNC: 71 MG/DL (ref 0–150)
TSH REFLEX: 0.87 UIU/ML (ref 0.27–4.2)
VLDLC SERPL CALC-MCNC: 14 MG/DL
WBC # BLD: 4.5 K/UL (ref 4–11)

## 2022-04-05 PROCEDURE — G0439 PPPS, SUBSEQ VISIT: HCPCS | Performed by: INTERNAL MEDICINE

## 2022-04-05 SDOH — HEALTH STABILITY: PHYSICAL HEALTH: ON AVERAGE, HOW MANY DAYS PER WEEK DO YOU ENGAGE IN MODERATE TO STRENUOUS EXERCISE (LIKE A BRISK WALK)?: 3 DAYS

## 2022-04-05 SDOH — HEALTH STABILITY: PHYSICAL HEALTH: ON AVERAGE, HOW MANY MINUTES DO YOU ENGAGE IN EXERCISE AT THIS LEVEL?: 30 MIN

## 2022-04-05 ASSESSMENT — LIFESTYLE VARIABLES
HOW OFTEN DO YOU HAVE SIX OR MORE DRINKS ON ONE OCCASION: 1
HOW MANY STANDARD DRINKS CONTAINING ALCOHOL DO YOU HAVE ON A TYPICAL DAY: PATIENT DECLINED
HOW MANY STANDARD DRINKS CONTAINING ALCOHOL DO YOU HAVE ON A TYPICAL DAY: 98
HOW OFTEN DO YOU HAVE A DRINK CONTAINING ALCOHOL: NEVER
HOW OFTEN DO YOU HAVE A DRINK CONTAINING ALCOHOL: 1

## 2022-04-05 ASSESSMENT — PATIENT HEALTH QUESTIONNAIRE - PHQ9
2. FEELING DOWN, DEPRESSED OR HOPELESS: 0
SUM OF ALL RESPONSES TO PHQ QUESTIONS 1-9: 0
SUM OF ALL RESPONSES TO PHQ9 QUESTIONS 1 & 2: 0
1. LITTLE INTEREST OR PLEASURE IN DOING THINGS: 0
SUM OF ALL RESPONSES TO PHQ QUESTIONS 1-9: 0

## 2022-04-05 NOTE — PATIENT INSTRUCTIONS
Personalized Preventive Plan for Jerome Salazar - 4/5/2022  Medicare offers a range of preventive health benefits. Some of the tests and screenings are paid in full while other may be subject to a deductible, co-insurance, and/or copay. Some of these benefits include a comprehensive review of your medical history including lifestyle, illnesses that may run in your family, and various assessments and screenings as appropriate. After reviewing your medical record and screening and assessments performed today your provider may have ordered immunizations, labs, imaging, and/or referrals for you. A list of these orders (if applicable) as well as your Preventive Care list are included within your After Visit Summary for your review. Other Preventive Recommendations:    · A preventive eye exam performed by an eye specialist is recommended every 1-2 years to screen for glaucoma; cataracts, macular degeneration, and other eye disorders. · A preventive dental visit is recommended every 6 months. · Try to get at least 150 minutes of exercise per week or 10,000 steps per day on a pedometer . · Order or download the FREE \"Exercise & Physical Activity: Your Everyday Guide\" from [employer]. Call 8-156.912.6126 or search [employer] online. · You need 7476-2810 mg of calcium and 7155-6472 IU of vitamin D per day. It is possible to meet your calcium requirement with diet alone, but a vitamin D supplement is usually necessary to meet this goal.  · When exposed to the sun, use a sunscreen that protects against both UVA and UVB radiation with an SPF of 30 or greater. Reapply every 2 to 3 hours or after sweating, drying off with a towel, or swimming. · Always wear a seat belt when traveling in a car. Always wear a helmet when riding a bicycle or motorcycle.

## 2022-04-05 NOTE — PROGRESS NOTES
Medicare Annual Wellness Visit    [facility] is here for Medicare AWV    Assessment & Plan      Medicare annual wellness visit, subsequent  Type 2 diabetes mellitus without complication, without long-term current use of insulin ([address]  -     Hemoglobin A1C; Future  Mixed hyperlipidemia  -     Comprehensive Metabolic Panel; Future  -     Lipid Panel; Future  -     TSH with Reflex; Future  Essential hypertension  -     Comprehensive Metabolic Panel; Future  Other fatigue  -     CBC with Auto Differential; Future  Anxiety  Chronic right-sided low back pain without sciatica      Recommendations for Preventive Services Due: see orders and patient instructions/AVS.  Recommended screening schedule for the next 5-10 years is provided to the patient in written form: see Patient Instructions/AVS.     Return in 6 months (on 10/5/2022) for HTN. Subjective      Diagnosis Orders   1. Medicare annual wellness visit, subsequent     2. Type 2 diabetes mellitus without complication, without long-term current use of insulin (HCC)  Hemoglobin A1C   3. Mixed hyperlipidemia  Comprehensive Metabolic Panel    Lipid Panel    TSH with Reflex   4. Essential hypertension  Comprehensive Metabolic Panel   5. Other fatigue  CBC with Auto Differential   6. Anxiety     7. Chronic right-sided low back pain without sciatica           Patient's complete Health Risk Assessment and screening values have been reviewed and are found in Flowsheets. The following problems were reviewed today and where indicated follow up appointments were made and/or referrals ordered.     Positive Risk Factor Screenings with Interventions:               General Health and ACP:  General  In general, how would you say your health is?: Very Good  In the past 7 days, have you experienced any of the following: New or Increased Pain, New or Increased Fatigue, Loneliness, Social Isolation, Stress or Anger?: No  Do you get the social and emotional support that you need?: (!) No  Do you have a Living Will?: (!) No    Advance Directives     Power of [address] Will ACP-Advance Directive ACP-Power of     Not on File Not on File Not on File Not on File      General Health Risk Interventions:  · Inadequate social/emotional support: patient declines any further intervention for this issue   · Living will discussion declined       Safety:  Do you have working smoke detectors?: Yes  Do you have any tripping hazards - loose or unsecured carpets or rugs?: No  Do you have any tripping hazards - clutter in doorways, halls, or stairs?: No  Do you have either shower bars, grab bars, non-slip mats or non-slip surfaces in your shower or bathtub?: (!) No  Do all of your stairways have a railing or banister?: (!) No  Do you always fasten your seatbelt when you are in a car?: Yes    Safety Interventions:  · Home safety tips provided           Objective   Vitals:    04/05/22 0959 04/05/22 1006   BP: (!) 150/70 (!) 170/82   Pulse: 76    Temp: 97.9 °F (36.6 °C)    SpO2: 99%    Weight: 161 lb 12.8 oz (73.4 kg)    Height: 5' 3\" (1.6 m)       Body mass index is 28.66 kg/m².            Home blood pressure readings -110-140/70-86    General Appearance: alert and oriented to person, place and time, well developed and well- nourished, in no acute distress  Skin: warm and dry, no rash or erythema  Head: normocephalic and atraumatic  Eyes: pupils equal, round, and reactive to light, extraocular eye movements intact, conjunctivae normal  ENT: tympanic membrane, external ear and ear canal normal bilaterally, nose without deformity, nasal mucosa and turbinates normal without polyps  Neck:  no cervical lymphadenopathy  Pulmonary/Chest: clear to auscultation bilaterally- no wheezes, rales or rhonchi, normal air movement, no respiratory distress  Cardiovascular: normal rate, regular rhythm, normal S1 and S2, no murmur  Abdomen: soft, non-tender, non-distended, normal bowel sounds, no masses or organomegaly  Extremities: no cyanosis, clubbing or edema  Musculoskeletal: lower back pain, stiffness with movement   Neurologic:  no cranial nerve deficit, gait, coordination and speech normal       Allergies   Allergen Reactions    Cephalosporins     Penicillins     Sulfa Antibiotics      Prior to Visit Medications    Medication Sig Taking?  Authorizing Provider   losartan (COZAAR) 25 MG tablet Take 1 tablet by mouth daily DELVIN Yes Moshe Mae MD   amLODIPine (NORVASC) 10 MG tablet Take 1 tablet by mouth daily DELVIN Yes Moshe Mae MD   Naproxen Sodium (ALEVE) 220 MG CAPS Take 1 tablet by mouth daily as needed (back or neck pain) Yes Historical Provider, MD Vargas (Including outside providers/suppliers regularly involved in providing care):   Patient Care Team:  Moshe Mae MD as PCP - General (Internal Medicine)  Moshe Mae MD as PCP - [facility] Empaneled Provider    Reviewed and updated this visit:  Tobacco  Allergies  Meds  Problems  Med Hx  Surg Hx  Soc Hx  Fam Hx

## 2022-04-06 LAB
ESTIMATED AVERAGE GLUCOSE: 159.9 MG/DL
HBA1C MFR BLD: 7.2 %

## 2022-04-25 ENCOUNTER — HOSPITAL ENCOUNTER (OUTPATIENT)
Dept: MAMMOGRAPHY | Age: 77
Discharge: HOME | End: 2022-04-25
Payer: MEDICARE

## 2022-04-25 VITALS — WEIGHT: 160 LBS | BODY MASS INDEX: 29.44 KG/M2 | HEIGHT: 62 IN

## 2022-04-25 DIAGNOSIS — Z12.31 VISIT FOR SCREENING MAMMOGRAM: ICD-10-CM

## 2022-04-25 PROCEDURE — 77067 SCR MAMMO BI INCL CAD: CPT

## 2022-05-11 DIAGNOSIS — I10 ESSENTIAL HYPERTENSION: ICD-10-CM

## 2022-05-12 RX ORDER — AMLODIPINE BESYLATE 10 MG/1
TABLET ORAL
Qty: 90 TABLET | Refills: 3 | Status: SHIPPED | OUTPATIENT
Start: 2022-05-12 | End: 2022-08-09 | Stop reason: SDUPTHER

## 2022-08-09 DIAGNOSIS — I10 ESSENTIAL HYPERTENSION: ICD-10-CM

## 2022-08-09 RX ORDER — AMLODIPINE BESYLATE 10 MG/1
TABLET ORAL
Qty: 90 TABLET | Refills: 1 | Status: SHIPPED | OUTPATIENT
Start: 2022-08-09 | End: 2022-08-11 | Stop reason: SDUPTHER

## 2022-08-09 NOTE — TELEPHONE ENCOUNTER
Pt needs a refill for the following medications      amLODIPine (NORVASC) 10 MG tablet         [facility] [id number] [facility], [facility] [address]   [address] [facility], [address]         Please call and advise.

## 2022-08-11 DIAGNOSIS — I10 ESSENTIAL HYPERTENSION: ICD-10-CM

## 2022-08-11 RX ORDER — LOSARTAN POTASSIUM 25 MG/1
TABLET ORAL
Qty: 90 TABLET | Refills: 1 | Status: ON HOLD | OUTPATIENT
Start: 2022-08-11 | End: 2022-08-22

## 2022-08-11 RX ORDER — AMLODIPINE BESYLATE 10 MG/1
TABLET ORAL
Qty: 90 TABLET | Refills: 1 | Status: ON HOLD
Start: 2022-08-11 | End: 2022-08-24 | Stop reason: HOSPADM

## 2022-08-11 RX ORDER — LOSARTAN POTASSIUM 25 MG/1
25 TABLET ORAL DAILY
Qty: 90 TABLET | Refills: 1 | Status: SHIPPED | OUTPATIENT
Start: 2022-08-11

## 2022-08-11 NOTE — TELEPHONE ENCOUNTER
Refill      90 day supply      losartan (COZAAR) 25 MG tablet          [facility] [id number] [facility] [place] - [address] [phone] Angelo Paige [phone]   [address], [address] [id number]   Phone:  [phone]  Fax:  [phone]

## 2022-08-11 NOTE — TELEPHONE ENCOUNTER
Orders Placed This Encounter   Medications    losartan (COZAAR) 25 MG tablet     Sig: Take 1 tablet by mouth in the morning. DELVIN.      Dispense:  90 tablet     Refill:  1     DELVIN    amLODIPine (NORVASC) 10 MG tablet     Sig: TAKE ONE TABLET BY MOUTH DAILY     Dispense:  90 tablet     Refill:  1

## 2022-08-11 NOTE — TELEPHONE ENCOUNTER
Patient needs her medications sent to different pharmacy-        amLODIPine (NORVASC) 10 MG tablet    losartan (COZAAR) 25 MG tablet      [facility] [id number], [address] [facility] [address]       Please advise

## 2022-08-21 ENCOUNTER — APPOINTMENT (OUTPATIENT)
Dept: GENERAL RADIOLOGY | Age: 77
DRG: 309 | End: 2022-08-21
Payer: MEDICARE

## 2022-08-21 ENCOUNTER — HOSPITAL ENCOUNTER (INPATIENT)
Age: 77
LOS: 3 days | Discharge: HOME | DRG: 309 | End: 2022-08-24
Attending: INTERNAL MEDICINE | Admitting: INTERNAL MEDICINE
Payer: MEDICARE

## 2022-08-21 DIAGNOSIS — R07.9 CHEST PAIN, UNSPECIFIED TYPE: ICD-10-CM

## 2022-08-21 DIAGNOSIS — I48.91 ATRIAL FIBRILLATION WITH RVR (HCC): Primary | ICD-10-CM

## 2022-08-21 LAB
A/G RATIO: 1.4 (ref 1.1–2.2)
ALBUMIN SERPL-MCNC: 4.7 G/DL (ref 3.4–5)
ALP BLD-CCNC: 115 U/L (ref 40–129)
ALT SERPL-CCNC: 22 U/L (ref 10–40)
ANION GAP SERPL CALCULATED.3IONS-SCNC: 14 MMOL/L (ref 3–16)
APTT: 29.5 SEC (ref 23–34.3)
AST SERPL-CCNC: 20 U/L (ref 15–37)
BASOPHILS ABSOLUTE: 0 K/UL (ref 0–0.2)
BASOPHILS RELATIVE PERCENT: 0.8 %
BILIRUB SERPL-MCNC: 0.6 MG/DL (ref 0–1)
BUN BLDV-MCNC: 8 MG/DL (ref 7–20)
CALCIUM SERPL-MCNC: 9.9 MG/DL (ref 8.3–10.6)
CHLORIDE BLD-SCNC: 107 MMOL/L (ref 99–110)
CO2: 23 MMOL/L (ref 21–32)
CREAT SERPL-MCNC: 0.8 MG/DL (ref 0.6–1.2)
D DIMER: 0.32 UG/ML FEU (ref 0–0.6)
EOSINOPHILS ABSOLUTE: 0.1 K/UL (ref 0–0.6)
EOSINOPHILS RELATIVE PERCENT: 1.4 %
GFR AFRICAN AMERICAN: >60
GFR NON-AFRICAN AMERICAN: >60
GLUCOSE BLD-MCNC: 157 MG/DL (ref 70–99)
HCT VFR BLD CALC: 40 % (ref 36–48)
HEMOGLOBIN: 13.3 G/DL (ref 12–16)
INR BLD: 0.96 (ref 0.87–1.14)
LIPASE: 9 U/L (ref 13–60)
LYMPHOCYTES ABSOLUTE: 2.5 K/UL (ref 1–5.1)
LYMPHOCYTES RELATIVE PERCENT: 42.9 %
MAGNESIUM: 1.8 MG/DL (ref 1.8–2.4)
MCH RBC QN AUTO: 29.6 PG (ref 26–34)
MCHC RBC AUTO-ENTMCNC: 33.4 G/DL (ref 31–36)
MCV RBC AUTO: 88.7 FL (ref 80–100)
MONOCYTES ABSOLUTE: 0.3 K/UL (ref 0–1.3)
MONOCYTES RELATIVE PERCENT: 5.8 %
NEUTROPHILS ABSOLUTE: 2.9 K/UL (ref 1.7–7.7)
NEUTROPHILS RELATIVE PERCENT: 49.1 %
PDW BLD-RTO: 15.3 % (ref 12.4–15.4)
PLATELET # BLD: 180 K/UL (ref 135–450)
PMV BLD AUTO: 8.9 FL (ref 5–10.5)
POTASSIUM REFLEX MAGNESIUM: 3.4 MMOL/L (ref 3.5–5.1)
PRO-BNP: 1115 PG/ML (ref 0–449)
PROTHROMBIN TIME: 12.7 SEC (ref 11.7–14.5)
RBC # BLD: 4.5 M/UL (ref 4–5.2)
SODIUM BLD-SCNC: 144 MMOL/L (ref 136–145)
TOTAL PROTEIN: 8 G/DL (ref 6.4–8.2)
TROPONIN: <0.01 NG/ML
WBC # BLD: 5.8 K/UL (ref 4–11)

## 2022-08-21 PROCEDURE — 83690 ASSAY OF LIPASE: CPT

## 2022-08-21 PROCEDURE — 2580000003 HC RX 258: Performed by: GENERAL ACUTE CARE HOSPITAL

## 2022-08-21 PROCEDURE — 84484 ASSAY OF TROPONIN QUANT: CPT

## 2022-08-21 PROCEDURE — 1200000000 HC SEMI PRIVATE

## 2022-08-21 PROCEDURE — 85025 COMPLETE CBC W/AUTO DIFF WBC: CPT

## 2022-08-21 PROCEDURE — 84439 ASSAY OF FREE THYROXINE: CPT

## 2022-08-21 PROCEDURE — 71046 X-RAY EXAM CHEST 2 VIEWS: CPT

## 2022-08-21 PROCEDURE — 80053 COMPREHEN METABOLIC PANEL: CPT

## 2022-08-21 PROCEDURE — 93005 ELECTROCARDIOGRAM TRACING: CPT | Performed by: INTERNAL MEDICINE

## 2022-08-21 PROCEDURE — 85610 PROTHROMBIN TIME: CPT

## 2022-08-21 PROCEDURE — 84443 ASSAY THYROID STIM HORMONE: CPT

## 2022-08-21 PROCEDURE — 85379 FIBRIN DEGRADATION QUANT: CPT

## 2022-08-21 PROCEDURE — 2060000000 HC ICU INTERMEDIATE R&B

## 2022-08-21 PROCEDURE — 96374 THER/PROPH/DIAG INJ IV PUSH: CPT

## 2022-08-21 PROCEDURE — 83880 ASSAY OF NATRIURETIC PEPTIDE: CPT

## 2022-08-21 PROCEDURE — 99285 EMERGENCY DEPT VISIT HI MDM: CPT

## 2022-08-21 PROCEDURE — 83735 ASSAY OF MAGNESIUM: CPT

## 2022-08-21 PROCEDURE — 85730 THROMBOPLASTIN TIME PARTIAL: CPT

## 2022-08-21 PROCEDURE — 2500000003 HC RX 250 WO HCPCS: Performed by: GENERAL ACUTE CARE HOSPITAL

## 2022-08-21 PROCEDURE — 6370000000 HC RX 637 (ALT 250 FOR IP): Performed by: INTERNAL MEDICINE

## 2022-08-21 PROCEDURE — 36415 COLL VENOUS BLD VENIPUNCTURE: CPT

## 2022-08-21 RX ORDER — 0.9 % SODIUM CHLORIDE 0.9 %
1000 INTRAVENOUS SOLUTION INTRAVENOUS ONCE
Status: COMPLETED | OUTPATIENT
Start: 2022-08-21 | End: 2022-08-22

## 2022-08-21 RX ORDER — LORAZEPAM 2 MG/ML
0.5 INJECTION INTRAMUSCULAR ONCE
Status: DISCONTINUED | OUTPATIENT
Start: 2022-08-21 | End: 2022-08-24 | Stop reason: HOSPADM

## 2022-08-21 RX ORDER — DILTIAZEM HYDROCHLORIDE 5 MG/ML
10 INJECTION INTRAVENOUS ONCE
Status: COMPLETED | OUTPATIENT
Start: 2022-08-21 | End: 2022-08-21

## 2022-08-21 RX ADMIN — DILTIAZEM HYDROCHLORIDE 10 MG: 5 INJECTION, SOLUTION INTRAVENOUS at 21:53

## 2022-08-21 RX ADMIN — SODIUM CHLORIDE 1000 ML: 9 INJECTION, SOLUTION INTRAVENOUS at 23:11

## 2022-08-21 RX ADMIN — DILTIAZEM HYDROCHLORIDE 5 MG/HR: 5 INJECTION, SOLUTION INTRAVENOUS at 23:11

## 2022-08-21 RX ADMIN — POTASSIUM BICARBONATE 40 MEQ: 782 TABLET, EFFERVESCENT ORAL at 23:08

## 2022-08-21 ASSESSMENT — ENCOUNTER SYMPTOMS
NAUSEA: 0
BACK PAIN: 0
COUGH: 0
SORE THROAT: 0
VOICE CHANGE: 0
SHORTNESS OF BREATH: 0
VOMITING: 0
WHEEZING: 0
CHEST TIGHTNESS: 0
ABDOMINAL PAIN: 0

## 2022-08-21 ASSESSMENT — PAIN SCALES - GENERAL: PAINLEVEL_OUTOF10: 3

## 2022-08-21 ASSESSMENT — PAIN - FUNCTIONAL ASSESSMENT: PAIN_FUNCTIONAL_ASSESSMENT: 0-10

## 2022-08-22 PROBLEM — E87.6 HYPOKALEMIA: Status: ACTIVE | Noted: 2022-08-22

## 2022-08-22 PROBLEM — I48.91 ATRIAL FIBRILLATION WITH RAPID VENTRICULAR RESPONSE (HCC): Status: ACTIVE | Noted: 2022-08-22

## 2022-08-22 LAB
ANION GAP SERPL CALCULATED.3IONS-SCNC: 15 MMOL/L (ref 3–16)
BASOPHILS ABSOLUTE: 0 K/UL (ref 0–0.2)
BASOPHILS RELATIVE PERCENT: 0.8 %
BUN BLDV-MCNC: 7 MG/DL (ref 7–20)
CALCIUM SERPL-MCNC: 9.6 MG/DL (ref 8.3–10.6)
CHLORIDE BLD-SCNC: 105 MMOL/L (ref 99–110)
CO2: 22 MMOL/L (ref 21–32)
CREAT SERPL-MCNC: 0.6 MG/DL (ref 0.6–1.2)
EKG DIAGNOSIS: NORMAL
EKG Q-T INTERVAL: 330 MS
EKG QRS DURATION: 80 MS
EKG QTC CALCULATION (BAZETT): 479 MS
EKG R AXIS: -5 DEGREES
EKG T AXIS: 182 DEGREES
EKG VENTRICULAR RATE: 127 BPM
EOSINOPHILS ABSOLUTE: 0 K/UL (ref 0–0.6)
EOSINOPHILS RELATIVE PERCENT: 0.3 %
ESTIMATED AVERAGE GLUCOSE: 180 MG/DL
GFR AFRICAN AMERICAN: >60
GFR NON-AFRICAN AMERICAN: >60
GLUCOSE BLD-MCNC: 146 MG/DL (ref 70–99)
HBA1C MFR BLD: 7.9 %
HCT VFR BLD CALC: 39.6 % (ref 36–48)
HEMOGLOBIN: 13.5 G/DL (ref 12–16)
LYMPHOCYTES ABSOLUTE: 2.2 K/UL (ref 1–5.1)
LYMPHOCYTES RELATIVE PERCENT: 42.7 %
MCH RBC QN AUTO: 30.2 PG (ref 26–34)
MCHC RBC AUTO-ENTMCNC: 34.1 G/DL (ref 31–36)
MCV RBC AUTO: 88.6 FL (ref 80–100)
MONOCYTES ABSOLUTE: 0.3 K/UL (ref 0–1.3)
MONOCYTES RELATIVE PERCENT: 6.7 %
NEUTROPHILS ABSOLUTE: 2.5 K/UL (ref 1.7–7.7)
NEUTROPHILS RELATIVE PERCENT: 49.5 %
PDW BLD-RTO: 15.5 % (ref 12.4–15.4)
PLATELET # BLD: 187 K/UL (ref 135–450)
PMV BLD AUTO: 8.7 FL (ref 5–10.5)
POTASSIUM REFLEX MAGNESIUM: 3.6 MMOL/L (ref 3.5–5.1)
RBC # BLD: 4.46 M/UL (ref 4–5.2)
SODIUM BLD-SCNC: 142 MMOL/L (ref 136–145)
T4 FREE: 1.3 NG/DL (ref 0.9–1.8)
TSH SERPL DL<=0.05 MIU/L-ACNC: 2.65 UIU/ML (ref 0.27–4.2)
WBC # BLD: 5.1 K/UL (ref 4–11)

## 2022-08-22 PROCEDURE — 83036 HEMOGLOBIN GLYCOSYLATED A1C: CPT

## 2022-08-22 PROCEDURE — 6370000000 HC RX 637 (ALT 250 FOR IP): Performed by: NURSE PRACTITIONER

## 2022-08-22 PROCEDURE — 2060000000 HC ICU INTERMEDIATE R&B

## 2022-08-22 PROCEDURE — 80048 BASIC METABOLIC PNL TOTAL CA: CPT

## 2022-08-22 PROCEDURE — 6360000002 HC RX W HCPCS: Performed by: NURSE PRACTITIONER

## 2022-08-22 PROCEDURE — 2500000003 HC RX 250 WO HCPCS: Performed by: INTERNAL MEDICINE

## 2022-08-22 PROCEDURE — 85025 COMPLETE CBC W/AUTO DIFF WBC: CPT

## 2022-08-22 PROCEDURE — 93010 ELECTROCARDIOGRAM REPORT: CPT | Performed by: INTERNAL MEDICINE

## 2022-08-22 PROCEDURE — 94760 N-INVAS EAR/PLS OXIMETRY 1: CPT

## 2022-08-22 PROCEDURE — 2580000003 HC RX 258: Performed by: INTERNAL MEDICINE

## 2022-08-22 PROCEDURE — 99223 1ST HOSP IP/OBS HIGH 75: CPT | Performed by: NURSE PRACTITIONER

## 2022-08-22 PROCEDURE — 36415 COLL VENOUS BLD VENIPUNCTURE: CPT

## 2022-08-22 PROCEDURE — 6370000000 HC RX 637 (ALT 250 FOR IP): Performed by: INTERNAL MEDICINE

## 2022-08-22 RX ORDER — MAGNESIUM SULFATE IN WATER 40 MG/ML
2000 INJECTION, SOLUTION INTRAVENOUS ONCE
Status: COMPLETED | OUTPATIENT
Start: 2022-08-22 | End: 2022-08-22

## 2022-08-22 RX ORDER — DILTIAZEM HYDROCHLORIDE 240 MG/1
240 CAPSULE, COATED, EXTENDED RELEASE ORAL DAILY
Status: DISCONTINUED | OUTPATIENT
Start: 2022-08-22 | End: 2022-08-24

## 2022-08-22 RX ORDER — SODIUM CHLORIDE 0.9 % (FLUSH) 0.9 %
10 SYRINGE (ML) INJECTION PRN
Status: DISCONTINUED | OUTPATIENT
Start: 2022-08-22 | End: 2022-08-24 | Stop reason: HOSPADM

## 2022-08-22 RX ORDER — POLYETHYLENE GLYCOL 3350 17 G/17G
17 POWDER, FOR SOLUTION ORAL DAILY PRN
Status: DISCONTINUED | OUTPATIENT
Start: 2022-08-22 | End: 2022-08-24 | Stop reason: HOSPADM

## 2022-08-22 RX ORDER — ACETAMINOPHEN 325 MG/1
650 TABLET ORAL EVERY 4 HOURS PRN
Status: DISCONTINUED | OUTPATIENT
Start: 2022-08-22 | End: 2022-08-24 | Stop reason: HOSPADM

## 2022-08-22 RX ORDER — LOSARTAN POTASSIUM 25 MG/1
25 TABLET ORAL DAILY
Status: DISCONTINUED | OUTPATIENT
Start: 2022-08-22 | End: 2022-08-24 | Stop reason: HOSPADM

## 2022-08-22 RX ORDER — SODIUM CHLORIDE 0.9 % (FLUSH) 0.9 %
10 SYRINGE (ML) INJECTION EVERY 12 HOURS SCHEDULED
Status: DISCONTINUED | OUTPATIENT
Start: 2022-08-22 | End: 2022-08-24 | Stop reason: HOSPADM

## 2022-08-22 RX ORDER — ACETAMINOPHEN 650 MG/1
650 SUPPOSITORY RECTAL EVERY 4 HOURS PRN
Status: DISCONTINUED | OUTPATIENT
Start: 2022-08-22 | End: 2022-08-24 | Stop reason: HOSPADM

## 2022-08-22 RX ORDER — SODIUM CHLORIDE 9 MG/ML
INJECTION, SOLUTION INTRAVENOUS PRN
Status: DISCONTINUED | OUTPATIENT
Start: 2022-08-22 | End: 2022-08-24 | Stop reason: HOSPADM

## 2022-08-22 RX ORDER — MULTIVIT-MIN/IRON/FOLIC ACID/K 18-600-40
CAPSULE ORAL
COMMUNITY

## 2022-08-22 RX ORDER — ONDANSETRON 2 MG/ML
4 INJECTION INTRAMUSCULAR; INTRAVENOUS EVERY 4 HOURS PRN
Status: DISCONTINUED | OUTPATIENT
Start: 2022-08-22 | End: 2022-08-24 | Stop reason: HOSPADM

## 2022-08-22 RX ORDER — POTASSIUM CHLORIDE 750 MG/1
40 TABLET, FILM COATED, EXTENDED RELEASE ORAL ONCE
Status: COMPLETED | OUTPATIENT
Start: 2022-08-22 | End: 2022-08-22

## 2022-08-22 RX ORDER — SODIUM CHLORIDE 9 MG/ML
INJECTION, SOLUTION INTRAVENOUS CONTINUOUS
Status: DISCONTINUED | OUTPATIENT
Start: 2022-08-22 | End: 2022-08-22

## 2022-08-22 RX ORDER — ENOXAPARIN SODIUM 100 MG/ML
1 INJECTION SUBCUTANEOUS 2 TIMES DAILY
Status: DISCONTINUED | OUTPATIENT
Start: 2022-08-22 | End: 2022-08-23

## 2022-08-22 RX ADMIN — LOSARTAN POTASSIUM 25 MG: 25 TABLET, FILM COATED ORAL at 09:08

## 2022-08-22 RX ADMIN — DILTIAZEM HYDROCHLORIDE 12.5 MG/HR: 5 INJECTION, SOLUTION INTRAVENOUS at 16:40

## 2022-08-22 RX ADMIN — POTASSIUM CHLORIDE 40 MEQ: 750 TABLET, FILM COATED, EXTENDED RELEASE ORAL at 12:05

## 2022-08-22 RX ADMIN — SODIUM CHLORIDE: 9 INJECTION, SOLUTION INTRAVENOUS at 01:35

## 2022-08-22 RX ADMIN — DILTIAZEM HYDROCHLORIDE 240 MG: 240 CAPSULE, COATED, EXTENDED RELEASE ORAL at 12:05

## 2022-08-22 RX ADMIN — MAGNESIUM SULFATE HEPTAHYDRATE 2000 MG: 40 INJECTION, SOLUTION INTRAVENOUS at 12:10

## 2022-08-22 ASSESSMENT — PAIN SCALES - GENERAL: PAINLEVEL_OUTOF10: 0

## 2022-08-22 NOTE — H&P
Hospital Medicine  History and Physical    PCP: Gigi Blankenship MD  Patient Name: Marcela Beltran    Date of Service: Pt seen/examined on 8/21/22 and admitted to Inpatient with expected LOS greater than two midnights due to medical therapy    CHIEF COMPLAINT:  Pt c/o chest pain, palpitations  HISTORY OF PRESENT ILLNESS: Pt is an 78y.o. year-old female with a history of hypertension who presents to the ER for evaluation following a 2-day history of chest pain and palpitations. She reports a dull, achy pain in the center of her chest and it feels as though her heart is racing. It is not made better or worse with exertion. It is not associated with shortness of breath. In the emergency room she was found to have atrial fibrillation with rapid ventricular response. She has no prior history of atrial fibrillation. She is being admitted for further evaluation and treatment. Associated signs and symptoms do not include typical chest pain, shortness of breath, lightheaded, dizziness, diaphoresis, edema, orthopnea, paroxysmal nocturnal dyspnea, fever or chills. No recent medication changes. Past Medical History:        Diagnosis Date    Back pain 6/15/2010    Herniated disc 06/01/2000    Chiropractor Dr Eleanor Jaime, lumbar , left     Hyperlipidemia     Hypertension     Palpitations 6/15/2010       Past Surgical History:        Procedure Laterality Date    COLONOSCOPY  2011    Valdze Ruiz MD       Allergies:  Cephalosporins, Penicillins, and Sulfa antibiotics    Medications Prior to Admission:    Prior to Admission medications    Medication Sig Start Date End Date Taking? Authorizing Provider   Cholecalciferol (VITAMIN D) 50 MCG (2000 UT) CAPS capsule Take by mouth   Yes Historical Provider, MD   losartan (COZAAR) 25 MG tablet Take 1 tablet by mouth in the morning.  DELVIN. 8/11/22   Gigi Blankenship MD   losartan (COZAAR) 25 MG tablet TAKE ONE TABLET BY MOUTH DAILY 8/11/22   Gigi Blankenship MD   amLODIPine (NORVASC) 10 MG tablet TAKE ONE TABLET BY MOUTH DAILY 8/11/22   Kaden Leihg MD   Naproxen Sodium (ALEVE) 220 MG CAPS Take 1 tablet by mouth daily as needed (back or neck pain)    Historical Provider, MD       Family History:       Problem Relation Age of Onset    Hypertension Sister     Diabetes Sister     Breast Cancer Sister     Diabetes Maternal Aunt     Hypertension Other      Social History:   TOBACCO:   reports that she has never smoked. She has never used smokeless tobacco.  ETOH:   reports no history of alcohol use. OCCUPATION:      REVIEW OF SYSTEMS:  A full review of systems was performed and is negative except for that which appears in the HPI    Physical Exam:    Vitals: BP (!) 156/75   Pulse (!) 109   Temp 98.9 °F (37.2 °C) (Oral)   Resp 20   Ht 5' 2\" (1.575 m)   Wt 163 lb 5.8 oz (74.1 kg)   SpO2 100%   BMI 29.88 kg/m²   General appearance: WD/WN 78y.o. year-old female who is alert, appears stated age and is cooperative  HEENT: Head: Normocephalic, no lesions, without obvious abnormality. Eye: Normal external eye, conjunctiva, lids cornea, PEERL. Ears: Normal external ears. Non-tender. Nose: Normal external nose, mucus membranes and septum. Pharynx: Dental Hygiene adequate. Normal buccal mucosa. Normal pharynx. Neck: no adenopathy, no carotid bruit, no JVD, supple, symmetrical, trachea midline and thyroid not enlarged, symmetric, no tenderness/mass/nodules  Lungs: clear to auscultation bilaterally and no use of accessory muscles  Heart: irregularly irregular, rapid rate, S1, S2 normal, no murmur, click, rub or gallop and normal apical impulse  Abdomen: soft, non-tender; bowel sounds normal; no masses, no organomegaly  Extremities: extremities atraumatic, no cyanosis or edema and Homans sign is negative, no sign of DVT.     Capillary Refill: Acceptable < 3 seconds   Peripheral Pulses: +3 easily felt, not easily obliterated with pressures   Skin: Skin color, texture, turgor normal. No rashes or lesions on exposed skin  Neurologic: Neurovascularly intact without any focal sensory/motor deficits. Cranial nerves: II-XII intact, grossly non-focal. Gait was not tested. Mental Status: Alert and oriented, thought content appropriate, normal insight        CBC:   Recent Labs     08/21/22 2104   WBC 5.8   HGB 13.3        BMP:    Recent Labs     08/21/22 2104      K 3.4*      CO2 23   BUN 8   CREATININE 0.8   GLUCOSE 157*     Troponin:   Recent Labs     08/21/22 2104   Wesley Roberson <0.01     PT/INR:  No results found for: PTINR  U/A:    Lab Results   Component Value Date/Time    LEUKOCYTESUR Negative 07/08/2019 08:33 PM    RBCUA 1 07/08/2019 08:33 PM    SPECGRAV 1.013 07/08/2019 08:33 PM    UROBILINOGEN 0.2 07/08/2019 08:33 PM    BILIRUBINUR Negative 07/08/2019 08:33 PM    BLOODU TRACE 07/08/2019 08:33 PM    GLUCOSEU Negative 07/08/2019 08:33 PM    PROTEINU TRACE 07/08/2019 08:33 PM         RAD:   I have independently reviewed and interpreted the imaging studies below and based my recommendations to the patient on those findings. XR CHEST (2 VW)    Result Date: 8/21/2022  EXAMINATION: TWO XRAY VIEWS OF THE CHEST 8/21/2022 8:49 pm COMPARISON: None. HISTORY: ORDERING SYSTEM PROVIDED HISTORY: Chest Discomfort TECHNOLOGIST PROVIDED HISTORY: Reason for exam:->Chest Discomfort Reason for Exam: Chest Discomfort FINDINGS: The cardiomediastinal silhouette is not enlarged. No pleural effusion or pneumothorax. No focal consolidation. Spondylosis of the spine. No acute cardiopulmonary abnormality. EKG:   Read by ER in the absence of a Cardiologist shows  Atrial fibrillation with RVR with a rate of 127  Moderate voltage criteria for LVH  QTc is ST abnormality  ST Segments: nonspecific st changes.       Assessment:   Principal Problem:    New onset atrial fibrillation [facility])  Active Problems:    Essential hypertension    Mixed hyperlipidemia    Atrial fibrillation with rapid ventricular response ([address] Hypokalemia  Resolved Problems:    * No resolved hospital problems. *      Plan:       Atrial fibrillation with RVR in the setting of new onset atrial fibrillation - Pt was started on a Cardizem drip to control heart rate, and will be monitored on Telemetry. Cardiology has been consulted to help in evaluation and treatment. Pt has been started on therapeutic Lovenox. An echocardiogram and thyroid function tests have been ordered. Hypokalemia - replace Potassium and recheck in the am    Essential (primary) hypertension - continue home meds (with the exception of Norvasc, a Calcium Channel Blocker, like Diltiazem)  and monitor blood pressure    Hyperlipidemia - No currently on statin therapy. Defer to PCP           Code Status: Full Code  Diet: ADULT DIET; Regular  DVT Prophylaxis: Lovenox  (therapeutic)    (Advanced care planning has been discussed with patient and/or responsible family member and is reflected in the code status.  Further orders associated with this have been entered if appropriate)    Disposition: Anticipate that patient will remain in the hospital for 2 or more midnights depending on further evaluation and clinical course     Please note that over 50 minutes was spent in evaluating the patient, review of records and results, discussion with staff/family, etc.      Job Gregory MD

## 2022-08-22 NOTE — PROGRESS NOTES
Educated pt the importance of taking Lovenox and patient refused. She said she had to think about it. Pt agreed to wearing compression boots.

## 2022-08-22 NOTE — ED PROVIDER NOTES
[address] [facility]        Pt Name: Lev Arita  MRN: [id number]  [place] 1945  Date of evaluation: 8/21/2022  Provider: RODRICK Brito - [place]  PCP: Shubham Lancaster MD  Note Started: 11:35 PM EDT       MINO. I have evaluated this patient. My supervising physician was available for consultation. CHIEF COMPLAINT       Chief Complaint   Patient presents with    Chest Pain     Pt states that she has chest pain since yesterday. Pt states its pressure. HISTORY OF PRESENT ILLNESS   (Location, Timing/Onset, Context/Setting, Quality, Duration, Modifying Factors, Severity, Associated Signs and Symptoms)  Note limiting factors. Chief Complaint: Chest pain, palpitations    Lev Arita is a 78 y.o. female who presents to the emergency department today from home reporting 2-day history of chest pain and palpitations. Patient denies recent travel or known sick contacts. There is no history of coronary artery disease or irregular heartbeat. Patient does report history of hyperlipidemia and hypertension. She denies chest pain at present. She states the pain is intermittent and cramp-like. Pain is nonmigratory. She denies having any shortness of breath. She denies recent mobilization or surgeries. She denies having any lower extremity pain or swelling. She denies cough or hemoptysis. She denies nausea, vomiting, or diarrhea. Patient has not taken anything for the symptoms. Nursing Notes were all reviewed and agreed with or any disagreements were addressed in the HPI. REVIEW OF SYSTEMS    (2-9 systems for level 4, 10 or more for level 5)     Review of Systems   Constitutional:  Negative for chills, fatigue and fever. HENT:  Negative for congestion, sore throat and voice change. Eyes:  Negative for visual disturbance. Respiratory:  Negative for cough, chest tightness, shortness of breath and wheezing.     Cardiovascular: Positive for chest pain and palpitations. Negative for leg swelling. Gastrointestinal:  Negative for abdominal pain, nausea and vomiting. Endocrine: Negative for polydipsia and polyuria. Genitourinary:  Negative for difficulty urinating, dysuria and flank pain. Musculoskeletal:  Negative for back pain, neck pain and neck stiffness. Skin:  Negative for rash and wound. Allergic/Immunologic: Negative for immunocompromised state. Neurological:  Negative for dizziness, weakness and light-headedness. Hematological:  Does not bruise/bleed easily. Psychiatric/Behavioral:  Negative for sleep disturbance and suicidal ideas. The patient is nervous/anxious. Positives and Pertinent negatives as per HPI. Except as noted above in the ROS, all other systems were reviewed and negative. PAST MEDICAL HISTORY     Past Medical History:   Diagnosis Date    Back pain 6/15/2010    Herniated disc 06/01/2000    Chiropractor Dr Juan Bautista, lumbar , left     Hyperlipidemia     Hypertension     Palpitations 6/15/2010         SURGICAL HISTORY     Past Surgical History:   Procedure Laterality Date    COLONOSCOPY  2011    Valdez Ruiz MD         CURRENTMEDICATIONS       Previous Medications    AMLODIPINE (NORVASC) 10 MG TABLET    TAKE ONE TABLET BY MOUTH DAILY    LOSARTAN (COZAAR) 25 MG TABLET    Take 1 tablet by mouth in the morning. DELVIN. LOSARTAN (COZAAR) 25 MG TABLET    TAKE ONE TABLET BY MOUTH DAILY    NAPROXEN SODIUM (ALEVE) 220 MG CAPS    Take 1 tablet by mouth daily as needed (back or neck pain)         ALLERGIES     Cephalosporins, Penicillins, and Sulfa antibiotics    FAMILYHISTORY       Family History   Problem Relation Age of Onset    Hypertension Sister     Diabetes Sister     Breast Cancer Sister     Diabetes Maternal Aunt     Hypertension Other           SOCIAL HISTORY       Social History     Tobacco Use    Smoking status: Never    Smokeless tobacco: Never   Substance Use Topics    Alcohol use:  No Alcohol/week: 0.0 standard drinks    Drug use: No       SCREENINGS             PHYSICAL EXAM    (up to 7 for level 4, 8 or more for level 5)     ED Triage Vitals   BP Temp Temp Source Heart Rate Resp SpO2 Height Weight   08/21/22 2019 08/21/22 2019 08/21/22 2019 08/21/22 2019 08/21/22 2029 08/21/22 2029 08/21/22 2029 08/21/22 2029   (!) 189/112 99.2 °F (37.3 °C) Oral 96 18 98 % 5' 2\" (1.575 m) 163 lb 5.8 oz (74.1 kg)       Physical Exam  Vitals and nursing note reviewed. Constitutional:       General: She is not in acute distress. Appearance: Normal appearance. She is not ill-appearing. HENT:      Head: Normocephalic and atraumatic. Right Ear: External ear normal.      Left Ear: External ear normal.      Nose: Nose normal.      Mouth/Throat:      Mouth: Mucous membranes are moist.      Pharynx: Oropharynx is clear. Eyes:      General:         Right eye: No discharge. Left eye: No discharge. Extraocular Movements: Extraocular movements intact. Conjunctiva/sclera: Conjunctivae normal.   Cardiovascular:      Rate and Rhythm: Tachycardia present. Rhythm irregular. Pulses: Normal pulses. Heart sounds: Normal heart sounds. Pulmonary:      Effort: Pulmonary effort is normal. No respiratory distress. Breath sounds: Normal breath sounds. Abdominal:      General: Bowel sounds are normal.      Palpations: Abdomen is soft. Tenderness: There is no abdominal tenderness. There is no right CVA tenderness or left CVA tenderness. Musculoskeletal:         General: Normal range of motion. Cervical back: Normal range of motion and neck supple. No rigidity or tenderness. Right lower leg: No edema. Left lower leg: No edema. Skin:     General: Skin is warm and dry. Capillary Refill: Capillary refill takes less than 2 seconds. Neurological:      General: No focal deficit present. Mental Status: She is alert and oriented to person, place, and time. Psychiatric:         Attention and Perception: Attention and perception normal.         Mood and Affect: Mood is anxious. Speech: Speech normal.         Behavior: Behavior normal.         Thought Content: Thought content normal.         Cognition and Memory: Cognition and memory normal.         Judgment: Judgment normal.       DIAGNOSTIC RESULTS   LABS:    Labs Reviewed   COMPREHENSIVE METABOLIC PANEL W/ REFLEX TO MG FOR LOW K - Abnormal; Notable for the following components:       Result Value    Potassium reflex Magnesium 3.4 (*)     Glucose 157 (*)     All other components within normal limits   LIPASE - Abnormal; Notable for the following components:    Lipase 9.0 (*)     All other components within normal limits   BRAIN NATRIURETIC PEPTIDE - Abnormal; Notable for the following components:    Pro-BNP 1,115 (*)     All other components within normal limits   COVID-19, RAPID   CBC WITH AUTO DIFFERENTIAL   TROPONIN   PROTIME-INR   APTT   D-DIMER, QUANTITATIVE   MAGNESIUM       When ordered only abnormal lab results are displayed. All other labs were within normal range or not returned as of this dictation. EKG: When ordered, EKG's are interpreted by the Emergency Department Physician in the absence of a cardiologist.  Please see their note for interpretation of EKG. RADIOLOGY:   Non-plain film images such as CT, Ultrasound and MRI are read by the radiologist. Plain radiographic images are visualized and preliminarily interpreted by the ED Provider with the below findings:        Interpretation per the Radiologist below, if available at the time of this note:    XR CHEST (2 VW)   Final Result   No acute cardiopulmonary abnormality. XR CHEST (2 VW)    Result Date: 8/21/2022  EXAMINATION: TWO XRAY VIEWS OF THE CHEST 8/21/2022 8:49 pm COMPARISON: None.  HISTORY: ORDERING SYSTEM PROVIDED HISTORY: Chest Discomfort TECHNOLOGIST PROVIDED HISTORY: Reason for exam:->Chest Discomfort Reason for Exam: Chest Discomfort FINDINGS: The cardiomediastinal silhouette is not enlarged. No pleural effusion or pneumothorax. No focal consolidation. Spondylosis of the spine. No acute cardiopulmonary abnormality. PROCEDURES   Unless otherwise noted below, none     Procedures    CRITICAL CARE TIME   I personally saw the patient and independently provided 29 minutes of non-concurrent critical care time out of the total critical care time provided. This excludes time spent doing separately billable procedures. This includes time at the bedside, data interpretation, medication management, obtaining critical history from collateral sources if the patient is unable to provide it directly, and physician consultation. Specifics of interventions taken and potentially life-threatening diagnostic considerations are listed above in the medical decision making. CONSULTS:  IP CONSULT TO HOSPITALIST  IP CONSULT TO CARDIOLOGY      EMERGENCY DEPARTMENT COURSE and DIFFERENTIAL DIAGNOSIS/MDM:   Vitals:    Vitals:    08/21/22 2200 08/21/22 2230 08/21/22 2300 08/21/22 2330   BP: (!) 109/94 (!) 164/79 (!) 153/84    Pulse: 81 (!) 120 (!) 108 (!) 104   Resp: 22 29 28 19   Temp:       TempSrc:       SpO2: 98% 99% 99% 100%   Weight:       Height:           Patient was given the following medications:  Medications   dilTIAZem injection 10 mg (10 mg IntraVENous Given 8/21/22 2153)     Followed by   dilTIAZem 125 mg in dextrose 5 % 125 mL infusion (5 mg/hr IntraVENous New Bag 8/21/22 2311)   0.9 % sodium chloride bolus (1,000 mLs IntraVENous New Bag 8/21/22 2311)   LORazepam (ATIVAN) injection 0.5 mg (0 mg IntraVENous Held 8/21/22 2312)   potassium bicarb-citric acid (EFFER-K) effervescent tablet 40 mEq (40 mEq Oral Given 8/21/22 2308)         Is this patient to be included in the SEP-1 Core Measure due to severe sepsis or septic shock?    No   Exclusion criteria - the patient is NOT to be included for SEP-1 Core Measure due to:  Infection is not suspected    Previous records reviewed in order to gain further information regarding patient's PMH as well as her HPI. Nursing notes reviewed. This is 79-year-old -American female with history of hypertension and hyperlipidemia who presents to the emergency department today reporting chest pain and palpitations. Symptoms began yesterday. Physical exam complete. Patient arrives nontoxic, afebrile, tachycardic, and hypertensive. EKG interpreted by ED physician reviewed by myself shows atrial fibrillation with RVR. There is no history of this. Chest x-ray interpreted by radiologist and reviewed by myself is negative for acute findings. Laboratory studies have been unremarkable. Patient was given IV Cardizem bolus and infusion. Patient also given IV Ativan 0.5 mg for anxiety. At this time there is no evidence of any life-threatening or emergent conditions requiring immediate intervention. Patient denies having any chest pain while here in the emergency department. Given patient's history and ED work-up I do feel she will benefit from admission for further evaluation and treatment. Patient and family are in agreement with plan of care. Patient admitted under hospitalist service. Patient is a full code at the time of this admission. FINAL IMPRESSION      1. Atrial fibrillation with RVR (HCC)    2. Chest pain, unspecified type          DISPOSITION/PLAN   DISPOSITION Admitted 08/21/2022 10:14:46 PM      PATIENT REFERRED TO:  No follow-up provider specified.     DISCHARGE MEDICATIONS:  New Prescriptions    No medications on file       DISCONTINUED MEDICATIONS:  Discontinued Medications    No medications on file              (Please note that portions of this note were completed with a voice recognition program.  Efforts were made to edit the dictations but occasionally words are mis-transcribed.)    RODRICK Bowen CNP (electronically signed)            Velma Shepherd Sana Long - [facility]  08/21/22 3875

## 2022-08-22 NOTE — CONSULTS
Cardiac Electrophysiology Consultation     Date: 8/22/2022  Admit Date:  8/21/2022  Admission Diagnosis: New onset atrial fibrillation ([facility] [I48.91]  Atrial fibrillation with RVR ([facility] [I48.91]  Chest pain, unspecified type [R07.9]     Reason for Consultation: new atrial fibrillation with RVR  Consult Requesting Physician: Sadie Miranda MD       History of Present Illness  Shane Ibrahim is a 78y.o. year old female with past medical history significant for HTN and HLD who presented to the ED complaining of epigastric pain and a lot of gas causing belching and flatulence. She says that when she arrived to the ED, she started to have palpitations that were fast and irregular, denies ever having these in the past. She was noted to be in atrial fibrillation with v-rates in the 130-150s, she was started on a diltiazem drip with improvement in her v-rates, now in the 90s-100s. She still notes palpitations but says the epigastric discomfort has resolved. Denies any increased dyspnea, fatigue, edema or syncope. She was started on therapeutic Lovenox but refused her dose this AM. We discussed the need for anticoagulation with the atrial fibrillation but she is still unsure if she wants to take anything or not.        Past Medical History:   Diagnosis Date    Back pain 6/15/2010    Herniated disc 06/01/2000    Chiropractor Dr Rahul Martinez, lumbar , left     Hyperlipidemia     Hypertension     Palpitations 6/15/2010        Past Surgical History:   Procedure Laterality Date    COLONOSCOPY  2011    Nick Ontiveros       Current Outpatient Medications   Medication Instructions    amLODIPine (NORVASC) 10 MG tablet TAKE ONE TABLET BY MOUTH DAILY    Cholecalciferol (VITAMIN D) 50 MCG (2000 UT) CAPS capsule Oral    losartan (COZAAR) 25 mg, Oral, DAILY, DELVIN    Naproxen Sodium (ALEVE) 220 MG CAPS 1 tablet, Oral, DAILY PRN        Allergies   Allergen Reactions    Cephalosporins     Penicillins     Sulfa Antibiotics        Social RVR   - first noted on EKG on 8/21/22   - symptomatic with palpitations   - discussed the progressive nature of atrial fibrillation including treatment options such as medial therapy, cardioversion, ablation and anticoagulation   - CHADS2-VASc 4 (age, gender, HTN) therapeutic Lovenox ordered but pt currently refusion, discussed the importance of anticoagulation with atrial fibrillation and the risk for stroke if not anticoagulated, she is currently contemplating it    - if not willing to take anticoagulant, only option is rate control, added PO diltiazem, can wean drip as tolerated for goal sustained HR<110   - echo pending   - TSH, electrolytes WNL    Essential HTN   - goal BP <130/80   - has been mildly elevated    Discussed with Dr. Mariluz Francois.     RODRICK Carballo  [facility], [address] [place] [address], [address] [place] [address]  Phone: [phone]  Fax: [phone]    Electronically signed by RODRICK Ordonez - CNP on 8/22/2022 at 10:31 AM

## 2022-08-22 NOTE — ED PROVIDER NOTES
The Ekg interpreted by me shows  Atrial fibrillation with RVR with a rate of 127  Moderate voltage criteria for LVH  QTc is ST abnormality  ST Segments: nonspecific st changes. I did not see or evaluate this patient. This is ekg interpretation only.          Aidan Ramos MD  08/21/22 3251

## 2022-08-22 NOTE — CONSULTS
The NP's [facility], EP NP) documentation has been prepared under my direction and personally reviewed by me in its entirety. I confirm that the consultation note created by the NP accurately reflects all work, physical examination, the discussion of treatments and procedures, and medical decision making by the NP. In addition, I have personally met with; performed a physical examination on; discussed the diagnosis (-es), treatment options including procedures, and formulated medical decisions for this patient. In brief, Isabell Suárez 78 y.o. female h/o HTN presents with belching and flatulence of unknown etiology and incidentaly also noted to be in new-found atrial fibrillation. [id number] score of at least 4 and warrants [address] for which she is discussing with her family as to whether to proceed with [address] or not. If on [address], may proceed with KAYLEN/CV to immediately restore SR (possibly planning for 8/23 if the patient is able to start [address] today). Will need to see me in clinic as outpatient to discuss other treatment options for atrial fibrillation, including AAD vs. Ablation. Currently well rate controlled while on Cardizem IV infusion and would recommend changing to oral.    Please refer to the NP's consult note for full details on the assessment and plan. Thank you for allowing us to participate in the care of your patient. If you have any questions, please do not hesitate to contact us.      Gurinder Cochran MD, MS, [facility]  Cardiac Electrophysiology  [address]  [address] [facility], [address] [place] [address]  Edd Liao [facility] [address]  [phone]

## 2022-08-22 NOTE — ED NOTES
Report called to 5N RN Kylah Mims to assume care. All questions answered, RN verbalized, SBAR discussed.       Robby Martinez RN  08/21/22 0487

## 2022-08-22 NOTE — PROGRESS NOTES
Hospitalist Progress Note      PCP: Elen Barrera MD    Date of Admission: 8/21/2022      Subjective: Up to chair, denies chest pain shortness of breath nausea vomiting. Medications:  Reviewed    Infusion Medications    sodium chloride      dilTIAZem 5 mg/hr (08/22/22 0453)     Scheduled Medications    losartan  25 mg Oral Daily    sodium chloride flush  10 mL IntraVENous 2 times per day    enoxaparin  1 mg/kg SubCUTAneous BID    dilTIAZem  240 mg Oral Daily    potassium chloride  40 mEq Oral Once    magnesium sulfate  2,000 mg IntraVENous Once    LORazepam  0.5 mg IntraVENous Once     PRN Meds: sodium chloride flush, sodium chloride, ondansetron, polyethylene glycol, acetaminophen **OR** acetaminophen      Intake/Output Summary (Last 24 hours) at 8/22/2022 1110  Last data filed at 8/22/2022 0453  Gross per 24 hour   Intake 677.02 ml   Output 550 ml   Net 127.02 ml       Physical Exam Performed:    BP (!) 135/92   Pulse (!) 108   Temp 98.5 °F (36.9 °C) (Oral)   Resp 20   Ht 5' 2\" (1.575 m)   Wt 159 lb 2.8 oz (72.2 kg)   SpO2 99%   BMI 29.11 kg/m²     General appearance: No apparent distress,  Neck: Supple  Respiratory:  Normal respiratory effort. Clear to auscultation, bilaterally without Rales/Wheezes/Rhonchi. Cardiovascular: Regular rate and rhythm with normal S1/S2 without murmurs, rubs or gallops. Abdomen: Soft, non-tender, non-distended   Musculoskeletal: No clubbing, cyanosis   Skin: Skin color, texture, turgor normal.  No rashes or lesions.   Neurologic:  No focal weakness  Psychiatric: Alert and oriented  Capillary Refill: Brisk,3 seconds, normal   Peripheral Pulses: +2 palpable, equal bilaterally       Labs:   Recent Labs     08/21/22  2104 08/22/22  0650   WBC 5.8 5.1   HGB 13.3 13.5   HCT 40.0 39.6    187     Recent Labs     08/21/22  2104 08/22/22  0650    142   K 3.4* 3.6    105   CO2 23 22   BUN 8 7   CREATININE 0.8 0.6   CALCIUM 9.9 9.6     Recent Labs 08/21/22 2104   AST 20   ALT 22   BILITOT 0.6   ALKPHOS 115     Recent Labs     08/21/22 2104   INR 0.96     Recent Labs     08/21/22 2104   Rossana Median <0.01       Urinalysis:      Lab Results   Component Value Date/Time    NITRU Negative 07/08/2019 08:33 PM    WBCUA 2 07/08/2019 08:33 PM    BACTERIA RARE 07/08/2019 08:33 PM    RBCUA 1 07/08/2019 08:33 PM    BLOODU TRACE 07/08/2019 08:33 PM    SPECGRAV 1.013 07/08/2019 08:33 PM    GLUCOSEU Negative 07/08/2019 08:33 PM       Radiology:  XR CHEST (2 VW)   Final Result   No acute cardiopulmonary abnormality. Assessment/Plan:    Active Hospital Problems    Diagnosis     Atrial fibrillation with rapid ventricular response (HCC) [I48.91]      Priority: Medium    Hypokalemia [E87.6]      Priority: Medium    New onset atrial fibrillation (HCC) [I48.91]      Priority: Medium    Mixed hyperlipidemia [E78.2]      Priority: Medium    Essential hypertension [I10]      Priority: Medium       Atrial fibrillation with rapid ventricular response, with new onset A. fib, patient on admission started on Cardizem drip, telemetry monitoring, PCU admission, cardiology consulted on admission. Patient apparently was started on therapeutic Lovenox, admission, patient refusing at this time cardiology following. Hypokalemia being replaced  Essential hypertension, resume p.o. medication  Hyperlipidemia not on statin      Diet: ADULT DIET;  Regular  Code Status: Full Code        Nicanor Bingham MD

## 2022-08-23 ENCOUNTER — APPOINTMENT (OUTPATIENT)
Dept: CARDIAC CATH/INVASIVE PROCEDURES | Age: 77
DRG: 309 | End: 2022-08-23
Payer: MEDICARE

## 2022-08-23 LAB
ANION GAP SERPL CALCULATED.3IONS-SCNC: 12 MMOL/L (ref 3–16)
BASOPHILS ABSOLUTE: 0 K/UL (ref 0–0.2)
BASOPHILS RELATIVE PERCENT: 0.8 %
BUN BLDV-MCNC: 11 MG/DL (ref 7–20)
CALCIUM SERPL-MCNC: 9.6 MG/DL (ref 8.3–10.6)
CHLORIDE BLD-SCNC: 107 MMOL/L (ref 99–110)
CO2: 21 MMOL/L (ref 21–32)
CREAT SERPL-MCNC: 0.7 MG/DL (ref 0.6–1.2)
EOSINOPHILS ABSOLUTE: 0.1 K/UL (ref 0–0.6)
EOSINOPHILS RELATIVE PERCENT: 1.1 %
GFR AFRICAN AMERICAN: >60
GFR NON-AFRICAN AMERICAN: >60
GLUCOSE BLD-MCNC: 155 MG/DL (ref 70–99)
GLUCOSE BLD-MCNC: 174 MG/DL (ref 70–99)
GLUCOSE BLD-MCNC: 194 MG/DL (ref 70–99)
GLUCOSE BLD-MCNC: 255 MG/DL (ref 70–99)
HCT VFR BLD CALC: 41.9 % (ref 36–48)
HEMOGLOBIN: 14.2 G/DL (ref 12–16)
LV EF: 58 %
LVEF MODALITY: NORMAL
LYMPHOCYTES ABSOLUTE: 2.1 K/UL (ref 1–5.1)
LYMPHOCYTES RELATIVE PERCENT: 39.2 %
MCH RBC QN AUTO: 30.1 PG (ref 26–34)
MCHC RBC AUTO-ENTMCNC: 34 G/DL (ref 31–36)
MCV RBC AUTO: 88.6 FL (ref 80–100)
MONOCYTES ABSOLUTE: 0.4 K/UL (ref 0–1.3)
MONOCYTES RELATIVE PERCENT: 8.1 %
NEUTROPHILS ABSOLUTE: 2.7 K/UL (ref 1.7–7.7)
NEUTROPHILS RELATIVE PERCENT: 50.8 %
PDW BLD-RTO: 15.1 % (ref 12.4–15.4)
PERFORMED ON: ABNORMAL
PLATELET # BLD: 197 K/UL (ref 135–450)
PMV BLD AUTO: 8.8 FL (ref 5–10.5)
POTASSIUM REFLEX MAGNESIUM: 3.9 MMOL/L (ref 3.5–5.1)
RBC # BLD: 4.73 M/UL (ref 4–5.2)
SODIUM BLD-SCNC: 140 MMOL/L (ref 136–145)
WBC # BLD: 5.4 K/UL (ref 4–11)

## 2022-08-23 PROCEDURE — 6370000000 HC RX 637 (ALT 250 FOR IP): Performed by: INTERNAL MEDICINE

## 2022-08-23 PROCEDURE — 2580000003 HC RX 258

## 2022-08-23 PROCEDURE — 85025 COMPLETE CBC W/AUTO DIFF WBC: CPT

## 2022-08-23 PROCEDURE — 99152 MOD SED SAME PHYS/QHP 5/>YRS: CPT | Performed by: INTERNAL MEDICINE

## 2022-08-23 PROCEDURE — 6370000000 HC RX 637 (ALT 250 FOR IP): Performed by: NURSE PRACTITIONER

## 2022-08-23 PROCEDURE — 80048 BASIC METABOLIC PNL TOTAL CA: CPT

## 2022-08-23 PROCEDURE — 99212 OFFICE O/P EST SF 10 MIN: CPT | Performed by: INTERNAL MEDICINE

## 2022-08-23 PROCEDURE — 2580000003 HC RX 258: Performed by: INTERNAL MEDICINE

## 2022-08-23 PROCEDURE — 6360000002 HC RX W HCPCS

## 2022-08-23 PROCEDURE — 1200000000 HC SEMI PRIVATE

## 2022-08-23 PROCEDURE — 2500000003 HC RX 250 WO HCPCS

## 2022-08-23 PROCEDURE — 83036 HEMOGLOBIN GLYCOSYLATED A1C: CPT

## 2022-08-23 PROCEDURE — 93312 ECHO TRANSESOPHAGEAL: CPT | Performed by: INTERNAL MEDICINE

## 2022-08-23 PROCEDURE — 36415 COLL VENOUS BLD VENIPUNCTURE: CPT

## 2022-08-23 PROCEDURE — 33285 INSJ SUBQ CAR RHYTHM MNTR: CPT

## 2022-08-23 PROCEDURE — 99233 SBSQ HOSP IP/OBS HIGH 50: CPT | Performed by: NURSE PRACTITIONER

## 2022-08-23 PROCEDURE — B24BZZ4 ULTRASONOGRAPHY OF HEART WITH AORTA, TRANSESOPHAGEAL: ICD-10-PCS | Performed by: INTERNAL MEDICINE

## 2022-08-23 PROCEDURE — 93306 TTE W/DOPPLER COMPLETE: CPT

## 2022-08-23 RX ORDER — DEXTROSE MONOHYDRATE 100 MG/ML
INJECTION, SOLUTION INTRAVENOUS CONTINUOUS PRN
Status: DISCONTINUED | OUTPATIENT
Start: 2022-08-23 | End: 2022-08-24 | Stop reason: HOSPADM

## 2022-08-23 RX ORDER — FLECAINIDE ACETATE 100 MG/1
50 TABLET ORAL EVERY 12 HOURS SCHEDULED
Status: CANCELLED | OUTPATIENT
Start: 2022-08-23

## 2022-08-23 RX ORDER — INSULIN LISPRO 100 [IU]/ML
0-4 INJECTION, SOLUTION INTRAVENOUS; SUBCUTANEOUS NIGHTLY
Status: DISCONTINUED | OUTPATIENT
Start: 2022-08-23 | End: 2022-08-24 | Stop reason: HOSPADM

## 2022-08-23 RX ORDER — METOPROLOL SUCCINATE 25 MG/1
12.5 TABLET, EXTENDED RELEASE ORAL DAILY
Status: DISCONTINUED | OUTPATIENT
Start: 2022-08-23 | End: 2022-08-24 | Stop reason: HOSPADM

## 2022-08-23 RX ORDER — INSULIN LISPRO 100 [IU]/ML
0-4 INJECTION, SOLUTION INTRAVENOUS; SUBCUTANEOUS
Status: DISCONTINUED | OUTPATIENT
Start: 2022-08-23 | End: 2022-08-24 | Stop reason: HOSPADM

## 2022-08-23 RX ADMIN — DILTIAZEM HYDROCHLORIDE 240 MG: 240 CAPSULE, COATED, EXTENDED RELEASE ORAL at 08:58

## 2022-08-23 RX ADMIN — POLYETHYLENE GLYCOL 3350 17 G: 17 POWDER, FOR SOLUTION ORAL at 20:26

## 2022-08-23 RX ADMIN — APIXABAN 5 MG: 5 TABLET, FILM COATED ORAL at 20:26

## 2022-08-23 RX ADMIN — Medication 10 ML: at 08:59

## 2022-08-23 RX ADMIN — METOPROLOL SUCCINATE 12.5 MG: 25 TABLET, EXTENDED RELEASE ORAL at 20:26

## 2022-08-23 RX ADMIN — LOSARTAN POTASSIUM 25 MG: 25 TABLET, FILM COATED ORAL at 08:58

## 2022-08-23 RX ADMIN — Medication 10 ML: at 20:27

## 2022-08-23 RX ADMIN — DIPHENHYDRAMINE HCL 5 ML: 12.5 SOLUTION ORAL at 18:46

## 2022-08-23 RX ADMIN — APIXABAN 5 MG: 5 TABLET, FILM COATED ORAL at 09:48

## 2022-08-23 ASSESSMENT — PAIN SCALES - GENERAL
PAINLEVEL_OUTOF10: 0
PAINLEVEL_OUTOF10: 0

## 2022-08-23 NOTE — PROGRESS NOTES
Hospitalist Progress Note      PCP: Sarahy Torre MD    Date of Admission: 8/21/2022      Subjective: No chest pain shortness of breath nausea or vomiting. Family at bedside and supportive      Medications:  Reviewed    Infusion Medications    sodium chloride      dilTIAZem Stopped (08/22/22 2132)     Scheduled Medications    apixaban  5 mg Oral BID    losartan  25 mg Oral Daily    sodium chloride flush  10 mL IntraVENous 2 times per day    dilTIAZem  240 mg Oral Daily    LORazepam  0.5 mg IntraVENous Once     PRN Meds: sodium chloride flush, sodium chloride, ondansetron, polyethylene glycol, acetaminophen **OR** acetaminophen      Intake/Output Summary (Last 24 hours) at 8/23/2022 1118  Last data filed at 8/22/2022 2033  Gross per 24 hour   Intake 210 ml   Output --   Net 210 ml       Physical Exam Performed:    BP (!) 140/64   Pulse 87   Temp 97.8 °F (36.6 °C) (Oral)   Resp 18   Ht 5' 2\" (1.575 m)   Wt 160 lb 4.4 oz (72.7 kg)   SpO2 98%   BMI 29.31 kg/m²     General appearance: No apparent distress  Neck: Supple  Respiratory:  Normal respiratory effort. Clear to auscultation, bilaterally without Rales/Wheezes/Rhonchi. Cardiovascular: Regular rate and rhythm with normal S1/S2 without murmurs, rubs or gallops. Abdomen: Soft, non-tender, non-distended   Musculoskeletal: No clubbing, cyanosis  Skin: Skin color, texture, turgor normal.  No rashes or lesions.   Neurologic:  No focal weakness  Psychiatric: Alert and oriented  Capillary Refill: Brisk,3 seconds, normal   Peripheral Pulses: +2 palpable, equal bilaterally       Labs:   Recent Labs     08/21/22 2104 08/22/22  0650 08/23/22  0526   WBC 5.8 5.1 5.4   HGB 13.3 13.5 14.2   HCT 40.0 39.6 41.9    187 197     Recent Labs     08/21/22 2104 08/22/22  0650 08/23/22  0526    142 140   K 3.4* 3.6 3.9    105 107   CO2 23 22 21   BUN 8 7 11   CREATININE 0.8 0.6 0.7   CALCIUM 9.9 9.6 9.6     Recent Labs     08/21/22 2104   AST 20   ALT 22 BILITOT 0.6   ALKPHOS 115     Recent Labs     08/21/22 2104   INR 0.96     Recent Labs     08/21/22 2104   Michelle Rue <0.01       Urinalysis:      Lab Results   Component Value Date/Time    NITRU Negative 07/08/2019 08:33 PM    WBCUA 2 07/08/2019 08:33 PM    BACTERIA RARE 07/08/2019 08:33 PM    RBCUA 1 07/08/2019 08:33 PM    BLOODU TRACE 07/08/2019 08:33 PM    SPECGRAV 1.013 07/08/2019 08:33 PM    GLUCOSEU Negative 07/08/2019 08:33 PM       Radiology:  XR CHEST (2 VW)   Final Result   No acute cardiopulmonary abnormality. Assessment/Plan:    Active Hospital Problems    Diagnosis     Atrial fibrillation with rapid ventricular response (HCC) [I48.91]      Priority: Medium    Hypokalemia [E87.6]      Priority: Medium    New onset atrial fibrillation (HCC) [I48.91]      Priority: Medium    Mixed hyperlipidemia [E78.2]      Priority: Medium    Essential hypertension [I10]      Priority: Medium       Atrial fibrillation with rapid ventricular response, with new onset A. fib, patient on admission started on Cardizem drip, telemetry monitoring, PCU admission, cardiology consulted on admission. Patient apparently was started on therapeutic Lovenox, admission, patient declined Lovenox yesterday, currently started on Eliquis.   Hyperglycemia with elevated HA1C both in the range of diabetes mellitus, starting sliding scale, discussed finding with patient, patient being diagnosed with prediabetes in the  Essential hypertension, resume p.o. medication  Hyperlipidemia not on statin    Diet: Diet NPO Exceptions are: Sips of Water with Meds  Code Status: Full Code        Kim Singletary MD

## 2022-08-23 NOTE — PROCEDURES
[address]     Electrophysiology Procedure Note       Date of Procedure: 8/23/2022  Patient's Name: Chuckie Rowe  YOB: 1945   Medical Record Number: [id number]  Referring Physician: Eddie Werner MD  Procedure Performed by: Ciera Jones MD    Procedures performed:  Anesthesia: Monitored Anesthesia Care  Level of sedation plan: Moderate sedation (conscious sedation) with intravenous Midazolam 4 mg, Fentanyl 75 mcg, and Methohexital 60 mg  Sedation start time: 1556  Sedation stop time: 1616  Mallampati airway assessment class: I  ASA class: 1  Attempt at Trans-esophageal echocardiography    Indication of the procedure: Symptomatic, persistent atrial fibrillation     Details of procedure: The patient was brought to the cath lab area in a fasting and non-sedated state. The risks, benefits and alternatives of the procedure were discussed with the patient. The risks including, but not limited to, the risks of vascular injury, bleeding, infection, any pre-existing cardiac implantable electronic device malfunction, any pre-existing cardiac implantable electronic device's lead dislodgement, injury to cardiac and surrounding structures (including pneumothorax), stroke, myocardial infarction and death were discussed in detail. The patient was also counseled at length about the risks of jayce Covid-19 in the sherrie-operative and post-operative states including the recovery window of their procedure. The patient was made aware that jayce Covid-19 after a surgical procedure may worsen their prognosis for recovering from the virus and lend to a higher morbidity and or mortality risk. The patient was given the option of postponing their procedure. The patient was also presented reasonable alternatives to the proposed care, treatment, and services.  The discussion I have had with the patient encompassed risks, benefits, and side effects related to the alternatives and the risks related to not receiving the proposed care, treatment and services. The patient opted to proceed with the procedure. Written informed consent was signed and placed in the chart. A timeout protocol was completed to identify the patient and the procedure being performed. An independent trained observer assumed the sole responsibility of administering IV sedation medication - Versed, Fentanyl - at my direction and closely monitored the patient. A KAYLEN was attempted but we were not able to pass the probe down into the esophagus because the patient's oropharynx and pharynx were tense from patient's resistance, even with Versed, Fentanyl, and Methohexital. Patient just started Eliquis 5mg po BID. The patient was monitored continuously with ECG, pulse oximetry, blood pressure monitoring, and direct observation. We therefore aborted this procedure and will reschedule with Anesthesia service and intubation. Specimen collected: none       The patient tolerated the procedure well and there were no complications. Conclusion:   Attempt at KAYLEN but unsuccessful (see above). No cardioversion performed. Plan:   The patient will be re-attempted at KAYLEN/CV with Anesthesia service tomorrow. Thank you for allowing me to participate in the care of this patient. If you have any questions, please feel free to contact me.     Lupis Dooley MD, MS, [facility]  Cardiac Electrophysiology  [address]  [address] [facility], [address] [place] [address]  Edd L[facility]  [phone]

## 2022-08-23 NOTE — PROGRESS NOTES
Patient's heart rate consistently controlled at rate of 70's-80's. Cardizem gtt discontinued at 2132.  Notified Jack Whitfield NP.

## 2022-08-23 NOTE — PROGRESS NOTES
Cardiac Electrophysiology Progress Note     Admit Date: 2022     Reason for follow up: new atrial fibrillation with RVR    HPI and Interval History:   Lluvia Grossman is a 78y.o. year old female with past medical history significant for HTN and HLD who presented to the ED complaining of epigastric pain and a lot of gas causing belching and flatulence. She says that when she arrived to the ED, she started to have palpitations that were fast and irregular, denies ever having these in the past. She was noted to be in atrial fibrillation with v-rates in the 130-150s, she was started on a diltiazem drip with improvement in her v-rates, now in the 90s-100s. She still notes palpitations but says the epigastric discomfort has resolved. Denies any increased dyspnea, fatigue, edema or syncope. She discussed anticoagulation at length with myself and Dr. Bautista Yoo yesterday, she discussed with her family and she agrees to anticoagulation with Eliquis. Discussed side effects to look for and to notify the office. Remains in atrial fibrillation with mostly controlled v-rates. Plans for KAYLEN/cardioversion today with Dr. Bautista Yoo, pt agreeable. Physical Examination:  Vitals:    22 1144   BP: (!) 153/82   Pulse: 93   Resp: 15   Temp: 98 °F (36.7 °C)   SpO2: 99%        Intake/Output Summary (Last 24 hours) at 2022 1150  Last data filed at 2022  Gross per 24 hour   Intake 210 ml   Output --   Net 210 ml     In: 210 [P.O.:210]  Out: -    Wt Readings from Last 3 Encounters:   22 160 lb 4.4 oz (72.7 kg)   22 160 lb (72.6 kg)   22 161 lb 12.8 oz (73.4 kg)     Temp  Av.3 °F (36.8 °C)  Min: 97.8 °F (36.6 °C)  Max: 98.6 °F (37 °C)  Pulse  Av.5  Min: 69  Max: 109  BP  Min: 119/80  Max: 153/82  SpO2  Av.6 %  Min: 98 %  Max: 100 %    Telemetry: Atrial fibrillation v-rates 90s. Constitutional: Alert, in no acute distress. Appears stated age. Head: Normocephalic and atraumatic.    Eyes: Conjunctivae normal. EOM are normal.   Neck: Neck supple. No lymphadenopathy. No rigidity. No JVD present. Cardiovascular: Controlled rate, IRR. No murmurs, rubs or gallops. No S3 or S4.  Pulmonary/Chest: Clear breath sounds bilaterally. No crackles, wheezes or rhonchi. No respiratory accessory muscle use. Abdominal: Soft. Normal bowel sounds present. No distension, No tenderness. Musculoskeletal: No tenderness. No edema    Lymphadenopathy: Has no cervical adenopathy. Neurological: Alert and oriented. No gross deficits. Skin: Skin is warm and dry. No rash, lesions, ulcerations noted. Psychiatric: No anxiety or agitation. Labs, diagnostic and imaging results reviewed. Reviewed. Recent Labs     08/21/22  2104 08/22/22  0650 08/23/22  0526    142 140   K 3.4* 3.6 3.9    105 107   CO2 23 22 21   BUN 8 7 11   CREATININE 0.8 0.6 0.7     Recent Labs     08/21/22  2104 08/22/22  0650 08/23/22  0526   WBC 5.8 5.1 5.4   HGB 13.3 13.5 14.2   HCT 40.0 39.6 41.9   MCV 88.7 88.6 88.6    187 197     Lab Results   Component Value Date/Time    TROPONINI <0.01 08/21/2022 09:04 PM     Estimated Creatinine Clearance: 65 mL/min (based on SCr of 0.7 mg/dL).    No results found for: BNP  Lab Results   Component Value Date/Time    PROTIME 12.7 08/21/2022 09:04 PM    PROTIME 11.8 07/08/2019 06:20 PM    INR 0.96 08/21/2022 09:04 PM    INR 1.04 07/08/2019 06:20 PM     Lab Results   Component Value Date/Time    CHOL 233 04/05/2022 10:53 AM    HDL 68 04/05/2022 10:53 AM    HDL 67 02/16/2012 11:20 AM    TRIG 71 04/05/2022 10:53 AM       Scheduled Meds:   apixaban  5 mg Oral BID    insulin lispro  0-4 Units SubCUTAneous TID WC    insulin lispro  0-4 Units SubCUTAneous Nightly    losartan  25 mg Oral Daily    sodium chloride flush  10 mL IntraVENous 2 times per day    dilTIAZem  240 mg Oral Daily    LORazepam  0.5 mg IntraVENous Once     Continuous Infusions:   dextrose      sodium chloride      dilTIAZem Stopped (22)     PRN Meds:glucose, dextrose bolus **OR** dextrose bolus, glucagon (rDNA), dextrose, sodium chloride flush, sodium chloride, ondansetron, polyethylene glycol, acetaminophen **OR** acetaminophen     EC22  Atrial fibrillation at 127 BPM.     Echo: 22  Pending.     Assessment and Plan:     New atrial fibrillation with RVR              - first noted on EKG on 22              - symptomatic with palpitations              - discussed the progressive nature of atrial fibrillation including treatment options such as medial therapy, cardioversion, ablation and anticoagulation              - CHADS2-VASc 4 (age, gender, HTN) placed on Eliquis 5mg BID   - plan for KAYLEN/cardioversion this afternoon with Dr. Fabrice Palumbo, should be stable to d/c following cardioversion from our standpoint              - echo pending              - TSH, electrolytes WNL   - can discuss long term treatment options in detail during office follow up     Essential HTN              - goal BP <130/80              - has been mildly elevated      RODRICK Mccray  [facility] [address]  Phone: [phone]  Fax: [phone]    Electronically signed by RODRICK Velasquez - CNP on 2022 at 11:50 AM

## 2022-08-23 NOTE — H&P
H&P Update    I have reviewed the history and physical from [facility]  [facility], ANTON NP, on 2022 and examined the patient and find no relevant changes. I have reviewed with the patient and/or family the risks, benefits, and alternatives to the procedure. Pre-sedation Assessment    Patient:  Rl Smith   :   1945  Intended Procedure: KAYLEN/CV      Arrosa TseMcBain nurses notes reviewed and agreed. Medications reviewed  Allergies: Allergies   Allergen Reactions    Cephalosporins     Penicillins     Sulfa Antibiotics          Pre-Procedure Assessment/Plan:  ASA 2 - Patient with mild systemic disease with no functional limitations    Level of Sedation Plan: Moderate sedation    Post Procedure plan: Return to same level of care

## 2022-08-23 NOTE — PLAN OF CARE
Problem: Discharge Planning  Goal: Discharge to home or other facility with appropriate resources  8/23/2022 0912 by Chloe Jaimes RN  Outcome: Progressing     Problem: Pain  Goal: Verbalizes/displays adequate comfort level or baseline comfort level  8/23/2022 0912 by Chloe Jaimes RN  Outcome: Progressing     Problem: ABCDS Injury Assessment  Goal: Absence of physical injury  8/23/2022 0912 by Chloe Jaimes RN  Outcome: Progressing

## 2022-08-23 NOTE — PRE SEDATION
Sedation Pre-Procedure Note    Patient Name: Stacey Faust   YOB: 1945  Room/Bed: E7C-7373/5267-01  Medical Record Number: [id number]  Date: 8/23/2022   Time: 12:46 PM       Indication:  persistent atrial fibrillation    Consent: I have discussed with the patient and/or the patient representative the indication, alternatives, and the possible risks and/or complications of the planned procedure and the anesthesia methods. The patient and/or patient representative appear to understand and agree to proceed. Vital Signs:   Vitals:    08/23/22 1144   BP: (!) 153/82   Pulse: 93   Resp: 15   Temp: 98 °F (36.7 °C)   SpO2: 99%       Past Medical History:   has a past medical history of Back pain, Herniated disc, Hyperlipidemia, Hypertension, and Palpitations. Past Surgical History:   has a past surgical history that includes Colonoscopy (2011). Medications:   Scheduled Meds:    apixaban  5 mg Oral BID    insulin lispro  0-4 Units SubCUTAneous TID WC    insulin lispro  0-4 Units SubCUTAneous Nightly    losartan  25 mg Oral Daily    sodium chloride flush  10 mL IntraVENous 2 times per day    dilTIAZem  240 mg Oral Daily    LORazepam  0.5 mg IntraVENous Once     Continuous Infusions:    dextrose      sodium chloride      dilTIAZem Stopped (08/22/22 2132)     PRN Meds: glucose, dextrose bolus **OR** dextrose bolus, glucagon (rDNA), dextrose, sodium chloride flush, sodium chloride, ondansetron, polyethylene glycol, acetaminophen **OR** acetaminophen  Home Meds:   Prior to Admission medications    Medication Sig Start Date End Date Taking? Authorizing Provider   Cholecalciferol (VITAMIN D) 50 MCG (2000 UT) CAPS capsule Take by mouth   Yes Historical Provider, MD   losartan (COZAAR) 25 MG tablet Take 1 tablet by mouth in the morning.  DELVIN. 8/11/22   Kelly Mcelroy MD   amLODIPine (NORVASC) 10 MG tablet TAKE ONE TABLET BY MOUTH DAILY 8/11/22   Kelly Mcelroy MD   Naproxen Sodium (ALEVE) 220 MG CAPS Take 1 tablet by mouth daily as needed (back or neck pain)    Historical Provider, MD     Coumadin Use Last 7 Days:  no  Antiplatelet drug therapy use last 7 days: no  Other anticoagulant use last 7 days: yes - Eliquis 5mg po BID  Additional Medication Information:  n/a      Pre-Sedation Documentation and Exam:   I have personally completed a history, physical exam & review of systems for this patient (see notes).     Mallampati Airway Assessment:  Mallampati Class I - (soft palate, fauces, uvula & anterior/posterior tonsillar pillars are visible)    Prior History of Anesthesia Complications:   none    ASA Classification:  Class 2 - A normal healthy patient with mild systemic disease    Sedation/ Anesthesia Plan:   intravenous sedation    Medications Planned:   midazolam (Versed) intravenously, fentanyl intravenously, and Methohexital    Patient is an appropriate candidate for plan of sedation: yes    Electronically signed by Kim Bridges MD on 8/23/2022 at 12:46 PM

## 2022-08-23 NOTE — PROGRESS NOTES
Patient returned from cath lab. With assist of 2 Rns, patient heavily assisted back to bed. Patient easy to arouse but does not keep eyes open long enough to interact. VSS. Patient resting, asleep in bed. Family is at bedside. Patient currently unable to safely follow commands enough to swallow medications. Will continue to monitor and give oral medication as time allows.

## 2022-08-24 VITALS
HEIGHT: 62 IN | HEART RATE: 53 BPM | WEIGHT: 158.95 LBS | OXYGEN SATURATION: 98 % | BODY MASS INDEX: 29.25 KG/M2 | TEMPERATURE: 98.6 F | DIASTOLIC BLOOD PRESSURE: 65 MMHG | SYSTOLIC BLOOD PRESSURE: 133 MMHG | RESPIRATION RATE: 15 BRPM

## 2022-08-24 LAB
ANION GAP SERPL CALCULATED.3IONS-SCNC: 10 MMOL/L (ref 3–16)
BASOPHILS ABSOLUTE: 0 K/UL (ref 0–0.2)
BASOPHILS RELATIVE PERCENT: 0.9 %
BUN BLDV-MCNC: 14 MG/DL (ref 7–20)
CALCIUM SERPL-MCNC: 9 MG/DL (ref 8.3–10.6)
CHLORIDE BLD-SCNC: 107 MMOL/L (ref 99–110)
CO2: 21 MMOL/L (ref 21–32)
CREAT SERPL-MCNC: 1 MG/DL (ref 0.6–1.2)
EKG ATRIAL RATE: 55 BPM
EKG DIAGNOSIS: NORMAL
EKG P AXIS: 46 DEGREES
EKG P-R INTERVAL: 132 MS
EKG Q-T INTERVAL: 436 MS
EKG QRS DURATION: 82 MS
EKG QTC CALCULATION (BAZETT): 417 MS
EKG R AXIS: -4 DEGREES
EKG T AXIS: 132 DEGREES
EKG VENTRICULAR RATE: 55 BPM
EOSINOPHILS ABSOLUTE: 0.1 K/UL (ref 0–0.6)
EOSINOPHILS RELATIVE PERCENT: 1.6 %
ESTIMATED AVERAGE GLUCOSE: 182.9 MG/DL
GFR AFRICAN AMERICAN: >60
GFR NON-AFRICAN AMERICAN: 54
GLUCOSE BLD-MCNC: 160 MG/DL (ref 70–99)
GLUCOSE BLD-MCNC: 182 MG/DL (ref 70–99)
GLUCOSE BLD-MCNC: 187 MG/DL (ref 70–99)
HBA1C MFR BLD: 8 %
HCT VFR BLD CALC: 38.6 % (ref 36–48)
HEMOGLOBIN: 12.9 G/DL (ref 12–16)
LYMPHOCYTES ABSOLUTE: 2.4 K/UL (ref 1–5.1)
LYMPHOCYTES RELATIVE PERCENT: 50 %
MCH RBC QN AUTO: 29.8 PG (ref 26–34)
MCHC RBC AUTO-ENTMCNC: 33.5 G/DL (ref 31–36)
MCV RBC AUTO: 89 FL (ref 80–100)
MONOCYTES ABSOLUTE: 0.4 K/UL (ref 0–1.3)
MONOCYTES RELATIVE PERCENT: 9 %
NEUTROPHILS ABSOLUTE: 1.8 K/UL (ref 1.7–7.7)
NEUTROPHILS RELATIVE PERCENT: 38.5 %
PDW BLD-RTO: 14.9 % (ref 12.4–15.4)
PERFORMED ON: ABNORMAL
PERFORMED ON: ABNORMAL
PLATELET # BLD: 195 K/UL (ref 135–450)
PMV BLD AUTO: 8.7 FL (ref 5–10.5)
POTASSIUM REFLEX MAGNESIUM: 4 MMOL/L (ref 3.5–5.1)
RBC # BLD: 4.33 M/UL (ref 4–5.2)
SODIUM BLD-SCNC: 138 MMOL/L (ref 136–145)
WBC # BLD: 4.8 K/UL (ref 4–11)

## 2022-08-24 PROCEDURE — 80048 BASIC METABOLIC PNL TOTAL CA: CPT

## 2022-08-24 PROCEDURE — 6370000000 HC RX 637 (ALT 250 FOR IP): Performed by: INTERNAL MEDICINE

## 2022-08-24 PROCEDURE — 93010 ELECTROCARDIOGRAM REPORT: CPT | Performed by: INTERNAL MEDICINE

## 2022-08-24 PROCEDURE — 93005 ELECTROCARDIOGRAM TRACING: CPT | Performed by: INTERNAL MEDICINE

## 2022-08-24 PROCEDURE — 36415 COLL VENOUS BLD VENIPUNCTURE: CPT

## 2022-08-24 PROCEDURE — 85025 COMPLETE CBC W/AUTO DIFF WBC: CPT

## 2022-08-24 PROCEDURE — 2580000003 HC RX 258: Performed by: INTERNAL MEDICINE

## 2022-08-24 PROCEDURE — 94760 N-INVAS EAR/PLS OXIMETRY 1: CPT

## 2022-08-24 PROCEDURE — 99232 SBSQ HOSP IP/OBS MODERATE 35: CPT | Performed by: NURSE PRACTITIONER

## 2022-08-24 PROCEDURE — 6370000000 HC RX 637 (ALT 250 FOR IP): Performed by: NURSE PRACTITIONER

## 2022-08-24 RX ORDER — AMLODIPINE BESYLATE 5 MG/1
5 TABLET ORAL DAILY
Qty: 30 TABLET | Refills: 0 | Status: SHIPPED | OUTPATIENT
Start: 2022-08-24 | End: 2022-09-07 | Stop reason: SDUPTHER

## 2022-08-24 RX ORDER — FLECAINIDE ACETATE 50 MG/1
50 TABLET ORAL EVERY 12 HOURS SCHEDULED
Qty: 60 TABLET | Refills: 1 | Status: SHIPPED | OUTPATIENT
Start: 2022-08-24 | End: 2022-10-17 | Stop reason: DRUGHIGH

## 2022-08-24 RX ORDER — METOPROLOL SUCCINATE 25 MG/1
12.5 TABLET, EXTENDED RELEASE ORAL DAILY
Qty: 15 TABLET | Refills: 1 | Status: SHIPPED | OUTPATIENT
Start: 2022-08-25 | End: 2022-09-07 | Stop reason: SDUPTHER

## 2022-08-24 RX ORDER — FLECAINIDE ACETATE 100 MG/1
50 TABLET ORAL EVERY 12 HOURS SCHEDULED
Status: DISCONTINUED | OUTPATIENT
Start: 2022-08-24 | End: 2022-08-24 | Stop reason: HOSPADM

## 2022-08-24 RX ADMIN — Medication 10 ML: at 09:34

## 2022-08-24 RX ADMIN — FLECAINIDE ACETATE 50 MG: 100 TABLET ORAL at 09:30

## 2022-08-24 RX ADMIN — METOPROLOL SUCCINATE 12.5 MG: 25 TABLET, EXTENDED RELEASE ORAL at 09:32

## 2022-08-24 RX ADMIN — LOSARTAN POTASSIUM 25 MG: 25 TABLET, FILM COATED ORAL at 09:30

## 2022-08-24 RX ADMIN — APIXABAN 5 MG: 5 TABLET, FILM COATED ORAL at 09:29

## 2022-08-24 ASSESSMENT — PAIN SCALES - GENERAL: PAINLEVEL_OUTOF10: 0

## 2022-08-24 NOTE — CARE COORDINATION
INITIAL ASSESSMENT AND DISCHARGE SUMMARY:   08/24/22 1240   Service Assessment   Patient Orientation Alert and Oriented   Cognition Alert   History Provided By Patient   Primary Caregiver Self   Accompanied By/Relationship son   PCP Verified by CM Yes   Prior Functional Level Independent in ADLs/IADLs   Current Functional Level Independent in ADLs/IADLs   Can patient return to prior living arrangement Yes   Ability to make needs known: Good   Family able to assist with home care needs: Yes   Financial Resources Medicare   Social/Functional History   Lives With Alone   Type of Home [address] One level   Home Access Level entry   [facility] From Family   ADL Assistance Independent   Homemaking Assistance Independent   Ambulation Assistance Independent   Transfer Assistance Independent   Active  No   Patient's  Info family transports   Mode of Transportation Car   Discharge Planning   Living Arrangements Alone   Current Services Prior To Admission None   Potential Assistance Needed Prescription Assistance   DME Ordered? No   Potential Assistance Purchasing Medications Yes   Type of Home Care Services None   Patient expects to be discharged to: Apartment   History of falls? 0   Services At/After Discharge   [employer] Information Provided? No   Mode of Transport at Discharge Other (see comment)  (son to transport)   Confirm Follow Up Transport Self   Condition of Participation: Discharge Planning   The Plan for Transition of Care is related to the following treatment goals: home with follow up cardiology appointment   Spoke with patient and son at bedside. Patient plans to return home and follow up with cardiology outpatient. Patient requested a PCP list as her physician is retiring soon. She also confirmed some concerns about affording her new medications. I confirmed that her meds would be sent to retail pharmacy.  Per retail pharmacy, the copay would be less than $10. Patient and family verbalized an ability to pay. I provided education that whatever meds she would be on long term would be sent to her [address] [place] [address] after her outpatient follow-up. Patient verbalized understanding. No other discharge needs at this time. Patient's son to transport patient home at time of discharge.     Electronically signed by Viviana Carver RN on 8/24/2022 at 12:47 PM

## 2022-08-24 NOTE — PROGRESS NOTES
crackles, wheezes or rhonchi. No respiratory accessory muscle use. Abdominal: Soft. Normal bowel sounds present. No distension, No tenderness. Musculoskeletal: No tenderness. No edema    Lymphadenopathy: Has no cervical adenopathy. Neurological: Alert and oriented. No gross deficits. Skin: Skin is warm and dry. No rash, lesions, ulcerations noted. Psychiatric: No anxiety or agitation. Labs, diagnostic and imaging results reviewed. Reviewed. Recent Labs     08/22/22  0650 08/23/22  0526 08/24/22  0536    140 138   K 3.6 3.9 4.0    107 107   CO2 22 21 21   BUN 7 11 14   CREATININE 0.6 0.7 1.0     Recent Labs     08/22/22  0650 08/23/22  0526 08/24/22  0536   WBC 5.1 5.4 4.8   HGB 13.5 14.2 12.9   HCT 39.6 41.9 38.6   MCV 88.6 88.6 89.0    197 195     Lab Results   Component Value Date/Time    TROPONINI <0.01 08/21/2022 09:04 PM     Estimated Creatinine Clearance: 45 mL/min (based on SCr of 1 mg/dL).    No results found for: BNP  Lab Results   Component Value Date/Time    PROTIME 12.7 08/21/2022 09:04 PM    PROTIME 11.8 07/08/2019 06:20 PM    INR 0.96 08/21/2022 09:04 PM    INR 1.04 07/08/2019 06:20 PM     Lab Results   Component Value Date/Time    CHOL 233 04/05/2022 10:53 AM    HDL 68 04/05/2022 10:53 AM    HDL 67 02/16/2012 11:20 AM    TRIG 71 04/05/2022 10:53 AM       Scheduled Meds:   flecainide  50 mg Oral 2 times per day    apixaban  5 mg Oral BID    insulin lispro  0-4 Units SubCUTAneous TID WC    insulin lispro  0-4 Units SubCUTAneous Nightly    metoprolol succinate  12.5 mg Oral Daily    losartan  25 mg Oral Daily    sodium chloride flush  10 mL IntraVENous 2 times per day    LORazepam  0.5 mg IntraVENous Once     Continuous Infusions:   dextrose      sodium chloride      dilTIAZem Stopped (08/22/22 2132)     PRN Meds:glucose, dextrose bolus **OR** dextrose bolus, glucagon (rDNA), dextrose, magic (miracle) mouthwash with nystatin, sodium chloride flush, sodium chloride, ondansetron, polyethylene glycol, acetaminophen **OR** acetaminophen     EC22  Atrial fibrillation at 127 BPM.     Echo: 22  Left ventricular cavity size is normal.   There is asymmetric hypertrophy of the basal septum. No regional wall motion abnormalities are noted. Ejection fraction is visually estimated to be 55-60 %. Grade II diastolic dysfunction with elevated LV filling pressures. Pt appears to be in A fib   Mild calcification of the posterior leaflet of the mitral valve. Trivial mitral regurgitation. The left atrium is severely dilated. Normal right ventricular size and function. .   Mild tricuspid regurgitation. Assessment and Plan:     New atrial fibrillation with RVR              - first noted on EKG on 22              - symptomatic with palpitations              - discussed the progressive nature of atrial fibrillation including treatment options such as medial therapy, cardioversion, ablation and anticoagulation              - CHADS2-VASc 4 (age, gender, HTN) on Eliquis 5mg BID   - converted spontaneously to SR on    - start flecainide 50mg BID, stop diltiazem and continue Toprol 12.5mg BID   - can discuss long term treatment options in detail during office follow up     Essential HTN              - goal BP <130/80              - has been elevated    EP issues are stable, will sign off and arrange follow up. Please call with concerns.     RODRICK Rajput  [facility], [address] [place] [address], [address] [place] [address]  Phone: [phone]  Fax: [phone]    Electronically signed by RODRICK Pham - CNP on 2022 at 11:04 AM

## 2022-08-24 NOTE — PLAN OF CARE
Problem: Pain  Goal: Verbalizes/displays adequate comfort level or baseline comfort level  8/24/2022 1136 by Oscar Montoya RN  Outcome: Progressing     Problem: ABCDS Injury Assessment  Goal: Absence of physical injury  8/24/2022 1136 by Oscar Montoya, RN  Outcome: Progressing

## 2022-08-24 NOTE — PROGRESS NOTES
Patient's heart rate noted to dip down to mid 30's on the monitor and then converted back to sinus rhythm. Maintaining HR of 40's-50's. Patient remains asymptomatic. Notified Ai Sotelo NP and stat EKG ordered to confirm rhythm change.

## 2022-08-24 NOTE — PLAN OF CARE
Problem: Discharge Planning  Goal: Discharge to home or other facility with appropriate resources  8/23/2022 2235 by Montse Fuller RN  Outcome: Progressing     Problem: Pain  Goal: Verbalizes/displays adequate comfort level or baseline comfort level  8/23/2022 2235 by Montse Fuller RN  Outcome: Progressing     Problem: ABCDS Injury Assessment  Goal: Absence of physical injury  8/23/2022 2235 by Montse Fuller RN  Outcome: Progressing  Flowsheets (Taken 8/23/2022 0913 by Brooklynn Mcgregor RN)  Absence of Physical Injury: Implement safety measures based on patient assessment

## 2022-08-24 NOTE — PROGRESS NOTES
Patient discharging. IV removed, catheter intact, no bleeding noted, dressing applied. Patients belongings gathered up. Discharge paperwork, including new medications, appointments and medication changes went over with patient, no questions at this time. Transportation coming and will take patient to outpatient pharmacy before patient goes home. Tele monitor cleaned and taken back to [address] [place] [address].

## 2022-08-24 NOTE — PROGRESS NOTES
Patient's heart rate was 90's-120's afib at start of shift. Received first dose of toprol at 2026 and rate decreased to 50's-80's. Patient noted to occasionally dip down to mid 40's on the monitor. Asymptomatic and BP stable. Notified Emerson Whitfield NP. No new orders at this time.

## 2022-08-24 NOTE — DISCHARGE SUMMARY
Hospital Medicine Discharge Summary    Patient ID: Kat Celeste      Patient's PCP: Davis Phillips MD    Admit Date: 8/21/2022     Discharge Date:   08/24/2022    Admitting Provider: Vin Chowdary MD     Discharge Provider: Genna Eng MD     Discharge Diagnoses: Active Hospital Problems    Diagnosis     Atrial fibrillation with rapid ventricular response (HCC) [I48.91]      Priority: Medium    Hypokalemia [E87.6]      Priority: Medium    New onset atrial fibrillation (HCC) [I48.91]      Priority: Medium    Mixed hyperlipidemia [E78.2]      Priority: Medium    Essential hypertension [I10]      Priority: Medium       The patient was seen and examined on day of discharge and this discharge summary is in conjunction with any daily progress note from day of discharge. Hospital Course:     From HPI:\"Pt is an 78y.o. year-old female with a history of hypertension who presents to the ER for evaluation following a 2-day history of chest pain and palpitations. She reports a dull, achy pain in the center of her chest and it feels as though her heart is racing. It is not made better or worse with exertion. It is not associated with shortness of breath. In the emergency room she was found to have atrial fibrillation with rapid ventricular response. She has no prior history of atrial fibrillation. She is being admitted for further evaluation and treatment. Associated signs and symptoms do not include typical chest pain, shortness of breath, lightheaded, dizziness, diaphoresis, edema, orthopnea, paroxysmal nocturnal dyspnea, fever or chills. No recent medication changes. \"        Atrial fibrillation with rapid ventricular response, with new onset A. fib, patient on admission started on Cardizem drip, telemetry monitoring, PCU admission, cardiology consulted on admission.   Patient apparently was started on therapeutic Lovenox, acute on Eliquis converted to sinus, discussed with cardiology, samuel to discharge home, flecainide and beta-blocker started, okay to restart amlodipine at lower dose 5 mg for her blood pressure and ARB with losartan  Hyperglycemia with elevated HA1C both in the range of diabetes mellitus, starting sliding scale, discussed finding with patient again today, patient being diagnosed with prediabetes in the past, very reluctant to start any medication at this time I did discuss with her diet and follow-up with her primary care physician for further management and referrals  Essential hypertension, resumed amlodipine at low-dose along with losartan and beta-blocker  Hyperlipidemia not on statin, follow-up with primary care physician and cardiology for further stratification          Physical Exam Performed:     /65   Pulse 53   Temp 98.6 °F (37 °C) (Oral)   Resp 15   Ht 5' 2\" (1.575 m)   Wt 158 lb 15.2 oz (72.1 kg)   SpO2 98%   BMI 29.07 kg/m²       General appearance:  No apparent distress  HEENT:  Normal cephalic  Neck: Supple  Respiratory:  Normal respiratory effort. Clear to auscultation, bilaterally without Rales/Wheezes/Rhonchi. Cardiovascular:  Regular rate and rhythm with normal S1/S2 without murmurs, rubs or gallops. Abdomen: Soft, non-tender, non-distended  Musculoskeletal:  No clubbing, cyanosis  Skin: Skin color, texture, turgor normal.  No rashes or lesions. Neurologic:  No focal weakness  Psychiatric:  Alert and oriented  Capillary Refill: Brisk,< 3 seconds   Peripheral Pulses: +2 palpable, equal bilaterally       Labs:  For convenience and continuity at follow-up the following most recent labs are provided:      CBC:    Lab Results   Component Value Date/Time    WBC 4.8 08/24/2022 05:36 AM    HGB 12.9 08/24/2022 05:36 AM    HCT 38.6 08/24/2022 05:36 AM     08/24/2022 05:36 AM       Renal:    Lab Results   Component Value Date/Time     08/24/2022 05:36 AM    K 4.0 08/24/2022 05:36 AM     08/24/2022 05:36 AM    CO2 21 08/24/2022 05:36 AM    BUN 14 08/24/2022 05:36 AM    CREATININE 1.0 08/24/2022 05:36 AM    CALCIUM 9.0 08/24/2022 05:36 AM         Significant Diagnostic Studies    Radiology:   XR CHEST (2 VW)   Final Result   No acute cardiopulmonary abnormality. Consults:     IP CONSULT TO HOSPITALIST  IP CONSULT TO CARDIOLOGY    Disposition:  home     Condition at Discharge: Stable    Discharge Instructions/Follow-up:  PCP, Cardiology, GI    Code Status:  Full Code     Activity: activity as tolerated    Diet: diabetic diet      Discharge Medications:     Current Discharge Medication List             Details   flecainide (TAMBOCOR) 50 MG tablet Take 1 tablet by mouth every 12 hours  Qty: 60 tablet, Refills: 1      apixaban (ELIQUIS) 5 MG TABS tablet Take 1 tablet by mouth 2 times daily  Qty: 60 tablet, Refills: 1      metoprolol succinate (TOPROL XL) 25 MG extended release tablet Take 0.5 tablets by mouth daily  Qty: 15 tablet, Refills: 1                Details   amLODIPine (NORVASC) 5 MG tablet Take 1 tablet by mouth daily  Qty: 30 tablet, Refills: 0                Details   Cholecalciferol (VITAMIN D) 50 MCG (2000 UT) CAPS capsule Take by mouth      losartan (COZAAR) 25 MG tablet Take 1 tablet by mouth in the morning. DELVIN. Qty: 90 tablet, Refills: 1    Comments: DELVIN  Associated Diagnoses: Essential hypertension             Time Spent on discharge is more than 45 minutes in the examination, evaluation, counseling and review of medications and discharge plan. Signed:    Renetta Garcia MD   8/24/2022      Thank you Miles Mckeon MD for the opportunity to be involved in this patient's care. If you have any questions or concerns, please feel free to contact me at [phone].

## 2022-08-25 ENCOUNTER — CARE COORDINATION (OUTPATIENT)
Dept: CASE MANAGEMENT | Age: 77
End: 2022-08-25

## 2022-08-25 NOTE — CARE COORDINATION
[facility] Transitions Initial Follow Up Call    Call within 2 business days of discharge: Yes    Patient: Shane Ibrahim Patient : 1945   MRN: [id number]  Reason for Admission: afib  Discharge Date: 22 RARS: Readmission Risk Score: 7.3      Last Discharge [facility]       Date Complaint Diagnosis Description Type Department Provider    22 Chest Pain Atrial fibrillation with RVR ([address] . .. ED to Hosp-Admission (Discharged) (ADMITTED) Caity Vivas MD; Francie Meade We. .. Spoke with: [address]: [facility]    Non-face-to-face services provided:  Obtained and reviewed discharge summary and/or continuity of care documents  Education of patient/family/caregiver/guardian to support self-management-reviewed BP & HR monitoring, s/s problems, f/u  Assessment and support for treatment adherence and medication management-full medication reconciliation & 1111f completed    Care Transitions 24 Hour Call    Care Transitions Interventions         Transitions of Care Initial Call    Was this an external facility discharge? No Discharge Facility: NA    Challenges to be reviewed by the provider   Additional needs identified to be addressed with provider: No  none             Method of communication with provider : none    Advance Care Planning:   Does patient have an Advance Directive: not on file. LPN Care Coordinator contacted the patient by telephone to perform post hospital discharge assessment. Verified name and  with patient as identifiers. Provided introduction to self, and explanation of the LPN CC role. LPN CC reviewed discharge instructions, medical action plan and red flags with patient who verbalized understanding. Patient given an opportunity to ask questions and does not have any further questions or concerns at this time. Were discharge instructions available to patient? Yes.  Reviewed appropriate site of care based on symptoms and resources available to patient including: PCP  Specialist. The patient agrees to contact the PCP office for questions related to their healthcare. Was patient discharged with a pulse oximeter? no    Patient verified  and was pleasant and agreeable to transition calls. States she is good. Denies dizziness, SOB, palpitations, CP. Resting well, taking it easy. /59, P 51. Appetite good. Denies problems with bowels or bladder. Full medication reconciliation and 1111f completed. Cardio f/u , children to transport. Plans to call & discuss f/u with PCP. Denies needs. LPN CC provided contact information. Plan for follow-up call in 5-7 days based on severity of symptoms and risk factors. Plan for next call: symptom management-CP, palpitations, dizziness, SOB  self management-BP, HR  follow up appointment-cardio   medication management-new or changed       Follow Up  Future Appointments   Date Time Provider Omar Claros   2022  9:00 AM Kim Bridges MD [facility]   10/4/2022  9:30 AM Jodi Aguillon MD [facility] [facility] Transitions Initial Follow Up Call    Call within 2 business days of discharge: Yes    Patient: Mariia Cowan Patient : 1945   MRN: [id number]  Reason for Admission: afib  Discharge Date: 22 RARS: Readmission Risk Score: 7.3      Last Discharge [facility]       Date Complaint Diagnosis Description Type Department Provider    22 Chest Pain Atrial fibrillation with RVR ([address] . .. ED to Hosp-Admission (Discharged) (ADMITTED) Marsha Kidd MD; Jane Ennis We. .. Spoke with: OLY    Facility: [facility]    Attempted to reach patient via phone for initial post hospital transition call. VM left stating purpose of call along with my contact information requesting a return call.    Fabricio Sanches  [facility]  Care Transitions  [phone]    Care Transitions 24 Hour Call    Care Transitions Interventions         Follow Up  Future Appointments   Date Time Provider Omar Claros   9/1/2022  9:00 AM Dennise Oro MD [facility]   10/4/2022  9:30 AM Travis Olivia MD [address]       Elizabeth Arriaga LPN

## 2022-09-01 ENCOUNTER — OFFICE VISIT (OUTPATIENT)
Dept: CARDIOLOGY CLINIC | Age: 77
End: 2022-09-01
Payer: MEDICARE

## 2022-09-01 VITALS
HEART RATE: 61 BPM | BODY MASS INDEX: 29.63 KG/M2 | HEIGHT: 62 IN | DIASTOLIC BLOOD PRESSURE: 80 MMHG | SYSTOLIC BLOOD PRESSURE: 138 MMHG | WEIGHT: 161 LBS | OXYGEN SATURATION: 99 %

## 2022-09-01 DIAGNOSIS — I48.0 PAF (PAROXYSMAL ATRIAL FIBRILLATION) (HCC): Primary | ICD-10-CM

## 2022-09-01 DIAGNOSIS — I10 ESSENTIAL HYPERTENSION: ICD-10-CM

## 2022-09-01 DIAGNOSIS — Z79.01 CURRENT USE OF ANTICOAGULANT THERAPY: ICD-10-CM

## 2022-09-01 PROCEDURE — 93000 ELECTROCARDIOGRAM COMPLETE: CPT | Performed by: INTERNAL MEDICINE

## 2022-09-01 PROCEDURE — 99215 OFFICE O/P EST HI 40 MIN: CPT | Performed by: INTERNAL MEDICINE

## 2022-09-01 PROCEDURE — 1123F ACP DISCUSS/DSCN MKR DOCD: CPT | Performed by: INTERNAL MEDICINE

## 2022-09-01 NOTE — PROGRESS NOTES
Cardiac Electrophysiology Consultation   Date: 9/1/2022  Reason for Consultation: Atrial fibrillation  Consult Requesting Physician: Yue Douglas MD  Primary Care Physician: Yue Douglas MD    Chief Complaint:   Chief Complaint   Patient presents with    Follow-up     afib        HPI: Lillie Issa is a 78 y.o. patient with a history of essential hypertension, hyperlipidemia and atrial fibrillation. She presented to [facility] ED on 8/21/2022 reporting symptoms of epigastric pain and a lot of gas causing belching and flatulence. She stated that when she arrived to the ED, she started to have palpitations that were fast and irregular, denies ever having these in the past.   She was noted to be in atrial fibrillation with v-rates in the 130-150s, she was started on a diltiazem drip with improvement in her v-rates, now in the 90s-100s. She still notes palpitations but says the epigastric discomfort has resolved. Denies any increased dyspnea, fatigue, edema or syncope. On 8/23/2022 she underwent attempt at KAYLEN CV but  but we were not able to pass the KAYLEN probe down into the esophagus because the patient's oropharynx and pharynx were tense from patient's resistance, even with Versed, Fentanyl, and Methohexital.     She was started on Eliquis 5 mg BID for [id number] Score 5 for thromboembolic risk reduction. She was started on Flecainide and Toprol XL for rate and rhythm control. Interval History: Today, she presents to office accompanied by her sons and her daughter on the phone hospital follow for management of atrial fibrillation. EKG today shows SR. She is compliant with her medications and tolerating them well. She denies chest pain/pressure, tightness, edema, shortness of breath, heart racing, palpitations, lightheadedness, dizziness, syncope, presyncope,  PND or orthopnea.        Past Medical History:   Diagnosis Date    Back pain 6/15/2010    Herniated disc 06/01/2000    Chiropractor Dr Leyla Faust, lumbar , left     Hyperlipidemia     Hypertension     Palpitations 6/15/2010        Past Surgical History:   Procedure Laterality Date    COLONOSCOPY  2011    Valdez Ruiz MD       Allergies: Allergies   Allergen Reactions    Cephalosporins     Penicillins     Sulfa Antibiotics        Medication:   Prior to Admission medications    Medication Sig Start Date End Date Taking? Authorizing Provider   flecainide (TAMBOCOR) 50 MG tablet Take 1 tablet by mouth every 12 hours 8/24/22  Yes RODRICK Milan CNP   apixaban (ELIQUIS) 5 MG TABS tablet Take 1 tablet by mouth 2 times daily 8/24/22  Yes RODRICK Milan - CNP   metoprolol succinate (TOPROL XL) 25 MG extended release tablet Take 0.5 tablets by mouth daily 8/25/22  Yes RODRICK Milan CNP   amLODIPine (NORVASC) 5 MG tablet Take 1 tablet by mouth daily 8/24/22  Yes Nadine Cabrera MD   Cholecalciferol (VITAMIN D) 50 MCG (2000 UT) CAPS capsule Take by mouth   Yes Historical Provider, MD   losartan (COZAAR) 25 MG tablet Take 1 tablet by mouth in the morning. DELVIN. 8/11/22  Yes Aristeo Pereira MD   Naproxen Sodium (ALEVE) 220 MG CAPS Take 1 tablet by mouth daily as needed (back or neck pain)  8/24/22  Historical Provider, MD       Social History:   reports that she has never smoked. She has never used smokeless tobacco. She reports that she does not drink alcohol and does not use drugs. Family History:  family history includes Breast Cancer in her sister; Diabetes in her maternal aunt and sister; Hypertension in her sister and another family member. Reviewed.  Denies family history of sudden cardiac death, arrhythmia, premature CAD    Review of System:    General ROS: negative for - chills, fever   Psychological ROS: negative for - anxiety or depression  Ophthalmic ROS: negative for - eye pain or loss of vision  ENT ROS: negative for - epistaxis, headaches, nasal discharge, sore throat   Allergy and Immunology ROS: negative for - hives, nasal congestion   Hematological and Lymphatic ROS: negative for - bleeding problems, blood clots, bruising or jaundice  Endocrine ROS: negative for - skin changes, temperature intolerance or unexpected weight changes  Respiratory ROS: negative for - cough, hemoptysis, pleuritic pain, SOB, sputum changes or wheezing  Cardiovascular ROS: Per HPI. Gastrointestinal ROS: negative for - abdominal pain, blood in stools, diarrhea, hematemesis, melena, nausea/vomiting or swallowing difficulty/pain  Genito-Urinary ROS: negative for - dysuria or incontinence  Musculoskeletal ROS: negative for - joint swelling or muscle pain  Neurological ROS: negative for - confusion, dizziness, gait disturbance, headaches, numbness/tingling, seizures, speech problems, tremors, visual changes or weakness  Dermatological ROS: negative for - rash    Physical Examination:  Vitals:    09/01/22 0917   BP: 138/80   Pulse:    SpO2:        Constitutional: Oriented. No distress. Head: Normocephalic and atraumatic. Mouth/Throat: Oropharynx is clear and moist.   Eyes: Conjunctivae normal. EOM are normal.   Neck: Normal range of motion. Neck supple. No rigidity. No JVD present. Cardiovascular: Normal rate, regular rhythm, S1&S2 and intact distal pulses. Pulmonary/Chest: Bilateral respiratory sounds. No wheezes. No rhonchi. Abdominal: Soft. Bowel sounds present. No distension, No tenderness. Musculoskeletal: No tenderness. No edema    Lymphadenopathy: Has no cervical adenopathy. Neurological: Alert and oriented. Cranial nerve appears intact, No Gross deficit   Skin: Skin is warm and dry. No rash noted. Psychiatric: Has a normal mood, affect and behavior     Labs:  Reviewed. ECG: Sinus  rhythm with v-rate of 60 bpm with QRS duration 100 ms. No pathologic Q waves, ventricular pre-excitation, or QT prolongation. Studies:   1.  Event monitor: n/a      2. Echo: 8/23/2022  Left ventricular cavity size is normal.  There is asymmetric hypertrophy of the basal septum. No regional wall motion abnormalities are noted. Ejection fraction is visually estimated to be 55-60 %. Grade II diastolic dysfunction with elevated LV filling pressures. Pt appears to be in A fib  Mild calcification of the posterior leaflet of the mitral valve. Trivial mitral regurgitation. The left atrium is severely dilated. Normal right ventricular size and function. .  Mild tricuspid regurgitation. 3. Stress Test:  n/a    4. Cath: n/a    I independently reviewed and interpreted the ECG, MCOT, echocardiogram, stress test, and coronary angiography/PCI results and used them for my plan of care. Procedures:  1. Assessment/Plan:     Paroxysmal atrial fibrillation  - First noted on EKG on 8/21/2022.  - Converted spontaneously to SR on 8/24/2022  - Symptomatic with palpitations and heart bounding and racing.    - She has a CHADS2-VASc 4 (age, gender, HTN)   - Continue Eliquis 5mg BID for thromboembolic risk reduction.  -Tolerating will no signs or symptom of abnormal bruising or bleeding    - Continue Flecainide 50mg BID & Toprol 12.5mg BID. - msec. -TSH 2.65 (8/21/2022)   -LVEF 55 - 60 %. - LA volume/Index: 86 ml /49 ml/m2    -Atrial fibrillation risk factors including age, hypertension, obesity, inactivity and KATARINA were discussed with patient. Risk factor modification recommended. All questions were answered. We educated the patient that atrial fibrillation is a worsening and progressive disease, with more frequent episodes that will ensue. Subsequent episodes usually become more sustained to the extent that many individuals would then develop persistent atrial fibrillation. Once persistence is reached, permanent atrial fibrillation is inevitable.  We also discussed the fact that atrial fibrillation is associated with stroke, including life-threatening stroke, and therefore oral anticoagulation is warranted depending on the patient's [id number] score. We discussed different management options for atrial fibrillation including their risks and benefits. These options include use of cardioversion (mainly for persisting atrial fibrillation or atrial flutter) which provides an effective immediate therapy with success rates of 75% or higher, but it provides no short nor long term efficacy. Anti-arrhythmic medications provide a very effective short term therapy, but even with our most potent anti-arrhythmic medication there is limited long term efficacy (clinical studies have shown that 40% of patients remain atrial fibrillation-free after 4 years of follow-up after starting one of the more powerful anti-arrhythmic medication (amiodarone), and, if extrapolated, may have further diminishing success as time goes on). Atrial fibrillation ablation is a potentially curative therapy with very reasonable success rate after a first time procedure and with improving success rates with subsequent procedures. The risks, benefits and alternatives of the atrial fibrillation ablation procedure were discussed with the patient. The risks including, but not limited to, bleeding, infection, radiation exposure, injury to vascular, cardiac and surrounding structures (including pneumothorax), stroke, cardiac perforation, tamponade, need for emergent open heart surgery, need for pacemaker implantation, injury to the phrenic nerve, injury to the esophagus, myocardial infarction and death were discussed in detail. The patient was also counseled at length about the risks of jayce Covid-19 in the sherrie-operative and post-operative states including the recovery window of their procedure. The patient was made aware that jayce Covid-19 after a surgical procedure may worsen their prognosis for recovering from the virus and lend to a higher morbidity and or mortality risk. The patient was given the option of postponing their procedure.  The patient was also presented reasonable alternatives to the proposed care, treatment, and services. The discussion I have had with the patient encompassed risks, benefits, and side effects related to the alternatives and the risks related to not receiving the proposed care, treatment and services. I spent 40 minutes face to face with the patient, with greater than 50% of that time spent in counseling on the above. The patient would like some time to deliberate further regarding procedure. If he wishes to proceed, he will contact our nurse, Anton Peoples at which time we will schedule for a radiofrequency ablation with [employer] system with KAYLEN immediately prior to this ablation. We will order BMP, CBC and Type & Screen prior to the procedure. A cardiac CTA will be ordered for pulmonary vein mapping prior to this procedure. - Encouraged to watch \"That Sugar Film\" which can be found on [employer] and other [employer] services, which discusses the negative impact of processed sugar has on the body. - Much time was spent counseling the patient on healthier lifestyle, following a low sodium diet <2,400 mg of sodium a day and dramatically decreasing the intake of processed sugar.    - Patient educated to eat a diet which includes organic fruits, vegetables and meats. Essential hypertension  - Goal BP <130/80  - Continue current medical management. Follow up three months after procedure with Yarely Warren CNP if she decides to proceed otherwise follow up in 1 year. Thank you for allowing me to participate in the care of Kimberly Bernardo. All questions and concerns were addressed to the patient/family. Alternatives to my treatment were discussed. This note was scribed in the presence of Dr. Andrew Veronica MD by Freida Hess RN. The scribe's documentation has been prepared under my direction and personally reviewed by me in its entirety.  I confirm that the note above accurately reflects all work, physical examination, the discussion of treatments and procedures, and medical decision making performed by me.     Robby Sal MD, MS, [facility], [facility]  Cardiac Electrophysiology  [address]  [address] [place], [address] [facility]  [address]  [phone]

## 2022-09-01 NOTE — PATIENT INSTRUCTIONS
If you have any question regarding your ablation or would like to proceed with scheduling please contact Dr. Carson Like Nurse [facility] at [phone]. CTA Pre procedure instructions      As part of your pre procedure work up you will need to get a CT scan of your heart. This scan is completed in order to give Dr. Kip Serrano a map of the atrium (chamber of your heart) where he will be doing the ablation. Date:_________________________________    Time:_________________________________    Kenneth Judi 20 minutes prior to scheduled testing time  -Nothing to eat or drink for at least 4 hours prior to test    Atrial Fibrillation Ablation Pre procedure Instructions    Date:______________________________    Arrive at:__________________________    Procedure time:____________________    The morning of your procedure you will park in the hospital parking lot and report directly to the cath lab to check in. At the information desk stay right and go all the way to the end of the damian, this will take you directly to your check in desk for the cath lab. Pre-Procedure Instructions   You will need to fast (nothing to eat or drink) after midnight the day of your procedure  Do NOT chew gum or eat mints the day of your procedure. You will need to hold your Flecainide for 7 days prior to the procedure. You will need to hold your Eliquis for 12 hours prior to the procedure. You will need to hold your Aspirin for 7 days prior to the procedure. You may take your amlodipine the morning of the procedure with sips of water if you typically take at this time. Do not use any lotions, creams or perfume the morning of procedure. You will need to complete pre-procedure lab work 5-7 days prior to your procedure. Please have a responsible adult to drive you home after procedure, you should go home same day, but there is always a possibility of an overnight stay.   Cath lab will provide you with all post procedure instructions  A 3 month follow up will be scheduled with Dr. Day Shelton Nurse practitioner Lory Freedman CNP post procedure                                        [address] [facility] Lab services  [address] [facility]. [address] [address]  Phone: [phone]  The hours are Mon -Fri. 6:30 am - 4:00 pm   Saturday 8:00 am - noon  No appointment necessary               Primary Care Physician  recommendations.   Keyana Chew [address] [facility] [address] [facility] [place][note 2]  [phone]

## 2022-09-02 ENCOUNTER — CARE COORDINATION (OUTPATIENT)
Dept: CASE MANAGEMENT | Age: 77
End: 2022-09-02

## 2022-09-02 NOTE — CARE COORDINATION
[facility] Transitions Follow Up Call    2022    Patient: Ronnette Mortimer  Patient : 1945   MRN: [id number]  Reason for Admission: A-fib  Discharge Date: 22 RARS: Readmission Risk Score: 7.3         Spoke with: Ronnette Mortimer who states she is doing well. Denies any fever, chills, cp or sob. States she still has belching and gas with certain foods. She did have a f/u with cardio yesterday who would like to do a cath and another KAYLEN. Pt had an EKG that came back normal. Pt states she is thinking of the two procedures before saying yes. Pt does not want a HFU with PCP until she is able to continue her low sugar low sodium diet and have a recheck of her A1C. States she is following a vegetable and fresh fruit diet with no added sugar or salt. Spoke to pt about substitutes like Mrs Aj Deluna. Pt states her BM have changed from 2-3 a day to 1-2 every other day. Last BM . Pt is anxious about the potential upcoming procedures. Discussed how the procedure is done and reasons for having it done. Pt v/u. No other questions or concerns. Will continue to follow. Care Transitions Follow Up Call    Needs to be reviewed by the provider   Additional needs identified to be addressed with provider: No  none             Method of communication with provider : none      Care Transition Nurse contacted the patient by telephone to follow up after admission. Verified name and  with patient as identifiers. CTN provided contact information for future needs. Plan for follow-up call in 5-7 days based on severity of symptoms and risk factors.   Plan for next call: symptom management-.  follow up appointment-.          Care Transitions Subsequent and Final Call    Subsequent and Final Calls  Do you have any ongoing symptoms?: No  Have your medications changed?: No  Do you have any questions related to your medications?: No  Do you currently have any active services?: No  Do you have any needs or concerns that I can assist you with?: No  Identified Barriers: None  Care Transitions Interventions  Other Interventions:              Follow Up  Future Appointments   Date Time Provider Omar Claros   10/4/2022  9:30 AM Shira Garcia MD [address]       DUANE Renteria, RN   Care Transition Nurse  Mobile: [phone]

## 2022-09-06 ENCOUNTER — TELEPHONE (OUTPATIENT)
Dept: CARDIOLOGY CLINIC | Age: 77
End: 2022-09-06

## 2022-09-06 NOTE — TELEPHONE ENCOUNTER
Rosanna's daughter Angelique Iqbal called in this afternoon, she has a question in regards to the meeting Dr. Ford Fears had with her and her siblings last week, she would like to know why her mom wasn't put on a heart monitor, and she has questions about why she couldn't get the  electric shock instead of the ablation. She can be reached at [phone].

## 2022-09-06 NOTE — TELEPHONE ENCOUNTER
LVM for Reba Thompson to call tomorrow and speak with Tracy Mei, CACHORRO regarding appointment on 9/1/22.

## 2022-09-07 RX ORDER — METOPROLOL SUCCINATE 25 MG/1
12.5 TABLET, EXTENDED RELEASE ORAL DAILY
Qty: 45 TABLET | Refills: 1 | Status: SHIPPED | OUTPATIENT
Start: 2022-09-07

## 2022-09-07 RX ORDER — AMLODIPINE BESYLATE 5 MG/1
5 TABLET ORAL DAILY
Qty: 90 TABLET | Refills: 1 | Status: SHIPPED | OUTPATIENT
Start: 2022-09-07

## 2022-09-07 NOTE — TELEPHONE ENCOUNTER
Pt asking for a refill on   metoprolol succinate (TOPROL XL) 25 MG extended release tablet     amLODIPine (NORVASC) 5 MG tablet         [address], [facility] [phone]   [place] [place] [address]   Phone:  [phone]  Fax:  [phone]      Please advise

## 2022-09-08 NOTE — TELEPHONE ENCOUNTER
Left another message for Moffett Julio to return call with any questions regarding the visit or her mother's care.

## 2022-09-08 NOTE — TELEPHONE ENCOUNTER
Patients daughter returned call and wanted an explanation on ablation and cardioversion. Explained to her the difference and reasons for both. She also wanted to know about a smart watch. I let her know that it is not unreasonable. Encouraged her to call with any additional questions or concerns.

## 2022-09-09 ENCOUNTER — CARE COORDINATION (OUTPATIENT)
Dept: CASE MANAGEMENT | Age: 77
End: 2022-09-09

## 2022-09-09 NOTE — CARE COORDINATION
[facility] Transitions Follow Up Call    2022    Patient: Theresa Cord  Patient : 1945   MRN: [id number]  Reason for Admission: Afib  Discharge Date: 22 RARS: Readmission Risk Score: 7.3         Spoke with: no one    Care Transitions Subsequent and Final Call    Subsequent and Final Calls  Care Transitions Interventions  Other Interventions: Follow Up  Future Appointments   Date Time Provider Omar Claros   10/4/2022  9:30 AM Yadi Pacheco MD [facility] [place] [facility]       Follow up outreach call attempt, no answer. CTN left  with contact information and request for return call. CTN will continue with outreach call attempts.     BRIANNA GallardoN, RN   Care Transition Nurse  Mobile: [phone]

## 2022-09-16 ENCOUNTER — CARE COORDINATION (OUTPATIENT)
Dept: CASE MANAGEMENT | Age: 77
End: 2022-09-16

## 2022-09-16 NOTE — CARE COORDINATION
[facility] Transitions Follow Up Call    2022    Patient: Chuckie Rowe  Patient : 1945   MRN: [id number]  Reason for Admission: afib, hypokalemia, afib, hyperlipidemia, HTN-->home NN  Discharge Date: 22 RARS: Readmission Risk Score: 7.3         Spoke with: [address] Transitions Follow Up Call    Needs to be reviewed by the provider   Additional needs identified to be addressed with provider: No  none             Method of communication with provider : none      Care Transition Nurse (CTN) contacted the patient by telephone to follow up after admission. Verified name and  with patient as identifiers. Addressed changes since last contact: none  Discussed follow-up appointments. If no appointment was previously scheduled, appointment scheduling offered: Yes. Is follow up appointment scheduled within 7 days of discharge? Yes. Advance Care Planning:   Does patient have an Advance Directive: not on file. Advance Care Planning   Healthcare Decision Maker:    Primary Decision Maker: [employer] - [phone]    Secondary Decision Maker: Vidya Mane - Child - [phone]    Supplemental (Other) Decision Maker: Yoly Powell - Child - [phone]    Supplemental (Other) Decision Maker: Patrice Kelly Child - [phone]    CTN reviewed discharge instructions, medical action plan and red flags with patient and discussed any barriers to care and/or understanding of plan of care after discharge. Discussed appropriate site of care based on symptoms and resources available to patient including: PCP  Specialist. The patient agrees to contact the PCP office for questions related to their healthcare. Patients top risk factors for readmission: medical condition-A-fib    Patient verified  and was pleasant and agreeable to transition calls. States she is fine. Denies CP, SOB, palpitations, dizziness. States constipation is resolving. Appetite good. Denies problems with bowels or bladder. Denies medication changes. Denies needs. CTN provided contact information for future needs. Plan for follow-up call in 7-10 days based on severity of symptoms and risk factors. Plan for next call: symptom management-CP, palpitations, SOB, dizziness  follow up appointment-PCP 10/4  medication management-new or changed    Care Transitions Subsequent and Final Call    Subsequent and Final Calls  Do you have any ongoing symptoms?: No  Have your medications changed?: No  Do you have any questions related to your medications?: No  Do you currently have any active services?: No  Do you have any needs or concerns that I can assist you with?: No  Identified Barriers: None  Care Transitions Interventions  Other Interventions:                 Follow Up  Future Appointments   Date Time Provider Omar Claros   10/4/2022  9:30 AM Yadi Pacheco MD [address]       Sampson Calderon LPN

## 2022-09-23 ENCOUNTER — CARE COORDINATION (OUTPATIENT)
Dept: CASE MANAGEMENT | Age: 77
End: 2022-09-23

## 2022-09-23 NOTE — CARE COORDINATION
[facility] Transitions Follow Up Call    2022    Patient: Jagruti General  Patient : 1945   MRN: [id number]  Reason for Admission: Afib  Discharge Date: 22 RARS: Readmission Risk Score: 7.3         Spoke with: no one    Care Transitions Subsequent and Final Call    Subsequent and Final Calls  Care Transitions Interventions  Other Interventions: Follow Up  Future Appointments   Date Time Provider Omar Claros   10/4/2022  9:30 AM Genaro Doherty MD [address]       Final outreach call attempt, no answer. CTN could not leave VM as phone rang and rang. CTN will resolve episode and remain available.     DUANE Villavicencio, RN   Care Transition Nurse  Mobile: [phone]

## 2022-09-24 ENCOUNTER — APPOINTMENT (OUTPATIENT)
Dept: GENERAL RADIOLOGY | Age: 77
End: 2022-09-24
Payer: MEDICARE

## 2022-09-24 ENCOUNTER — HOSPITAL ENCOUNTER (EMERGENCY)
Age: 77
Discharge: HOME | End: 2022-09-24
Payer: MEDICARE

## 2022-09-24 VITALS
OXYGEN SATURATION: 99 % | HEART RATE: 99 BPM | RESPIRATION RATE: 18 BRPM | WEIGHT: 156.53 LBS | BODY MASS INDEX: 28.63 KG/M2 | DIASTOLIC BLOOD PRESSURE: 78 MMHG | TEMPERATURE: 98.6 F | SYSTOLIC BLOOD PRESSURE: 160 MMHG

## 2022-09-24 DIAGNOSIS — I48.91 ATRIAL FIBRILLATION, UNSPECIFIED TYPE (HCC): Primary | ICD-10-CM

## 2022-09-24 DIAGNOSIS — R00.2 PALPITATIONS: ICD-10-CM

## 2022-09-24 LAB
A/G RATIO: 1.6 (ref 1.1–2.2)
ALBUMIN SERPL-MCNC: 4.6 G/DL (ref 3.4–5)
ALP BLD-CCNC: 98 U/L (ref 40–129)
ALT SERPL-CCNC: 39 U/L (ref 10–40)
ANION GAP SERPL CALCULATED.3IONS-SCNC: 18 MMOL/L (ref 3–16)
AST SERPL-CCNC: 24 U/L (ref 15–37)
BASOPHILS ABSOLUTE: 0 K/UL (ref 0–0.2)
BASOPHILS RELATIVE PERCENT: 0.7 %
BILIRUB SERPL-MCNC: 1 MG/DL (ref 0–1)
BUN BLDV-MCNC: 9 MG/DL (ref 7–20)
CALCIUM SERPL-MCNC: 9.9 MG/DL (ref 8.3–10.6)
CHLORIDE BLD-SCNC: 105 MMOL/L (ref 99–110)
CO2: 22 MMOL/L (ref 21–32)
CREAT SERPL-MCNC: 0.9 MG/DL (ref 0.6–1.2)
EKG ATRIAL RATE: 122 BPM
EKG DIAGNOSIS: NORMAL
EKG P AXIS: 5 DEGREES
EKG P-R INTERVAL: 230 MS
EKG Q-T INTERVAL: 344 MS
EKG QRS DURATION: 88 MS
EKG QTC CALCULATION (BAZETT): 467 MS
EKG R AXIS: -3 DEGREES
EKG T AXIS: 202 DEGREES
EKG VENTRICULAR RATE: 111 BPM
EOSINOPHILS ABSOLUTE: 0.1 K/UL (ref 0–0.6)
EOSINOPHILS RELATIVE PERCENT: 1 %
GFR AFRICAN AMERICAN: >60
GFR NON-AFRICAN AMERICAN: >60
GLUCOSE BLD-MCNC: 127 MG/DL (ref 70–99)
HCT VFR BLD CALC: 43.1 % (ref 36–48)
HEMOGLOBIN: 14.5 G/DL (ref 12–16)
INR BLD: 1.39 (ref 0.87–1.14)
LYMPHOCYTES ABSOLUTE: 1.5 K/UL (ref 1–5.1)
LYMPHOCYTES RELATIVE PERCENT: 29.8 %
MAGNESIUM: 2 MG/DL (ref 1.8–2.4)
MCH RBC QN AUTO: 29.5 PG (ref 26–34)
MCHC RBC AUTO-ENTMCNC: 33.5 G/DL (ref 31–36)
MCV RBC AUTO: 87.9 FL (ref 80–100)
MONOCYTES ABSOLUTE: 0.3 K/UL (ref 0–1.3)
MONOCYTES RELATIVE PERCENT: 5.5 %
NEUTROPHILS ABSOLUTE: 3.2 K/UL (ref 1.7–7.7)
NEUTROPHILS RELATIVE PERCENT: 63 %
PDW BLD-RTO: 15.2 % (ref 12.4–15.4)
PLATELET # BLD: 160 K/UL (ref 135–450)
PMV BLD AUTO: 8.8 FL (ref 5–10.5)
POTASSIUM REFLEX MAGNESIUM: 3.4 MMOL/L (ref 3.5–5.1)
PROTHROMBIN TIME: 17 SEC (ref 11.7–14.5)
RBC # BLD: 4.91 M/UL (ref 4–5.2)
SODIUM BLD-SCNC: 145 MMOL/L (ref 136–145)
TOTAL PROTEIN: 7.4 G/DL (ref 6.4–8.2)
TROPONIN: <0.01 NG/ML
WBC # BLD: 5 K/UL (ref 4–11)

## 2022-09-24 PROCEDURE — 71045 X-RAY EXAM CHEST 1 VIEW: CPT

## 2022-09-24 PROCEDURE — 93010 ELECTROCARDIOGRAM REPORT: CPT | Performed by: INTERNAL MEDICINE

## 2022-09-24 PROCEDURE — 83735 ASSAY OF MAGNESIUM: CPT

## 2022-09-24 PROCEDURE — 99285 EMERGENCY DEPT VISIT HI MDM: CPT

## 2022-09-24 PROCEDURE — 93005 ELECTROCARDIOGRAM TRACING: CPT | Performed by: PHYSICIAN ASSISTANT

## 2022-09-24 PROCEDURE — 84484 ASSAY OF TROPONIN QUANT: CPT

## 2022-09-24 PROCEDURE — 36415 COLL VENOUS BLD VENIPUNCTURE: CPT

## 2022-09-24 PROCEDURE — 85610 PROTHROMBIN TIME: CPT

## 2022-09-24 PROCEDURE — 80053 COMPREHEN METABOLIC PANEL: CPT

## 2022-09-24 PROCEDURE — 85025 COMPLETE CBC W/AUTO DIFF WBC: CPT

## 2022-09-24 RX ORDER — DILTIAZEM HYDROCHLORIDE 5 MG/ML
INJECTION INTRAVENOUS
Status: DISCONTINUED
Start: 2022-09-24 | End: 2022-09-24 | Stop reason: HOSPADM

## 2022-09-24 ASSESSMENT — ENCOUNTER SYMPTOMS
DIARRHEA: 0
NAUSEA: 0
BACK PAIN: 0
EYE PAIN: 0
SORE THROAT: 0
ABDOMINAL PAIN: 0
COUGH: 0
CONSTIPATION: 0
VOMITING: 0
EYE REDNESS: 0
SHORTNESS OF BREATH: 0

## 2022-09-24 NOTE — ED PROVIDER NOTES
I did not perform an evaluation of Lluvia Chauncey but was asked to review her EKG. EKG interpreted by myself.    Sinus tachycardia with first-degree AV block and a rate of 111, occasional PAC, normal axis, , QRS 88, QTc 467, no ST elevations, nonspecific ST changes, left ventricular hypertrophy present, ST depressions with T wave inversions anterior lateral leads, no acute change when compared to EKG from 9/1/2022 except for increase in rate by 51 bpm     Lovely Shepherd MD  09/24/22 0079

## 2022-09-24 NOTE — ED PROVIDER NOTES
[address] [facility]        Pt Name: Jagruti Jones  MRN: [id number]  [place] 1945  Date of evaluation: 9/24/2022  Provider: Ashlee Melvin PA-C  PCP: Genaro Doherty MD  Note Started: 5:53 PM EDT      MINO. I have evaluated this patient. My supervising physician was available for consultation. Triage CHIEF COMPLAINT       Chief Complaint   Patient presents with    Chest Pain    Palpitations         HISTORY OF PRESENT ILLNESS   (Location/Symptom, Timing/Onset, Context/Setting, Quality, Duration, Modifying Factors, Severity)  Note limiting factors. Chief Complaint: A. fib with RVR    aJgruti Jones is a 78 y.o. female who presents to the emergency department with complaint of palpitations that have been going on since 8 AM.  This somewhat come and go. Patient states that they are been persistent throughout the day. She states that it is just a little bit uncomfortable. She was recently diagnosed with A. fib back in the beginning of September. She was put on flecainide and metoprolol as well as Eliquis. She states that she has not missed any doses of this. She denies any other pain at this time. She denies any fever, chills, viral-like symptoms, chest pain, shortness of breath, abdominal pain, nausea, vomiting, constipation, diarrhea, urinary symptoms, lightheadedness, headache    Nursing Notes were all reviewed and agreed with or any disagreements were addressed in the HPI. REVIEW OF SYSTEMS    (2-9 systems for level 4, 10 or more for level 5)     Review of Systems   Constitutional:  Negative for chills and fever. HENT:  Negative for ear pain and sore throat. Eyes:  Negative for pain and redness. Respiratory:  Negative for cough and shortness of breath. Cardiovascular:  Positive for palpitations. Negative for chest pain and leg swelling.    Gastrointestinal:  Negative for abdominal pain, constipation, diarrhea, nausea and vomiting. Genitourinary:  Negative for dysuria and hematuria. Musculoskeletal:  Negative for back pain and neck pain. Skin:  Negative for rash and wound. Neurological:  Negative for light-headedness and headaches. PAST MEDICAL HISTORY     Past Medical History:   Diagnosis Date    Back pain 6/15/2010    Herniated disc 06/01/2000    Chiropractor Dr Mylene Muir, lumbar , left     Hyperlipidemia     Hypertension     Palpitations 6/15/2010       SURGICAL HISTORY     Past Surgical History:   Procedure Laterality Date    COLONOSCOPY  2011    Scottnald Luisa       [address]       Discharge Medication List as of 9/24/2022  4:32 AM        CONTINUE these medications which have NOT CHANGED    Details   metoprolol succinate (TOPROL XL) 25 MG extended release tablet Take 0.5 tablets by mouth daily, Disp-45 tablet, R-1Normal      amLODIPine (NORVASC) 5 MG tablet Take 1 tablet by mouth daily, Disp-90 tablet, R-1Normal      flecainide (TAMBOCOR) 50 MG tablet Take 1 tablet by mouth every 12 hours, Disp-60 tablet, R-1Normal      apixaban (ELIQUIS) 5 MG TABS tablet Take 1 tablet by mouth 2 times daily, Disp-60 tablet, R-1Normal      Cholecalciferol (VITAMIN D) 50 MCG (2000 UT) CAPS capsule Take by mouthHistorical Med      losartan (COZAAR) 25 MG tablet Take 1 tablet by mouth in the morning.  DELVIN., Disp-90 tablet, R-1DAWNormal             ALLERGIES     Cephalosporins, Penicillins, and Sulfa antibiotics    FAMILYHISTORY       Family History   Problem Relation Age of Onset    Hypertension Sister     Diabetes Sister     Breast Cancer Sister     Diabetes Maternal Aunt     Hypertension Other         SOCIAL HISTORY       Social History     Socioeconomic History    Marital status: Single     Spouse name: None    Number of children: 4    Years of education: None    Highest education level: None   Occupational History     Comment: disabled   Tobacco Use    Smoking status: Never    Smokeless tobacco: Never   Substance and Sexual Activity    Alcohol use: No     Alcohol/week: 0.0 standard drinks    Drug use: No   Social History Narrative    Her son got  July 2017. Social Determinants of Health     Financial Resource Strain: Low Risk     Difficulty of Paying Living Expenses: Not hard at all   Food Insecurity: No Food Insecurity    Worried About [address] [employer] in the Last Year: Never true    Ran Out of Food in the Last Year: Never true   Physical Activity: Insufficiently Active    Days of Exercise per Week: 3 days    Minutes of Exercise per Session: 30 min       SCREENINGS    Vita Coma Scale  Eye Opening: Spontaneous  Best Verbal Response: Oriented  Best Motor Response: Obeys commands  [place] Coma Scale Score: 15        PHYSICAL EXAM    (up to 7 for level 4, 8 or more for level 5)     ED Triage Vitals [09/24/22 0034]   BP Temp Temp Source Heart Rate Resp SpO2 Height Weight   (!) 160/78 98.6 °F (37 °C) Oral 99 18 99 % -- 156 lb 8.4 oz (71 kg)       Physical Exam  Constitutional:       General: She is not in acute distress. Appearance: Normal appearance. She is not ill-appearing, toxic-appearing or diaphoretic. HENT:      Head: Normocephalic and atraumatic. Right Ear: External ear normal.      Left Ear: External ear normal.      Nose: Nose normal.   Eyes:      General:         Right eye: No discharge. Left eye: No discharge. Cardiovascular:      Rate and Rhythm: Tachycardia present. Rhythm irregular. Comments: Initial evaluation patient was tachycardic in the 120s, throughout her stay here in the emergency department this decreased to the 80s and 90s   She continued to stay in A. fib throughout her stay  Pulmonary:      Effort: Pulmonary effort is normal. No respiratory distress. Breath sounds: No stridor. No wheezing or rhonchi. Abdominal:      General: Bowel sounds are normal. There is no distension. Palpations: Abdomen is soft. Tenderness: There is no abdominal tenderness.  There is no right CVA tenderness, left CVA tenderness or rebound. Musculoskeletal:         General: No swelling or tenderness. Normal range of motion. Cervical back: Normal range of motion. Right lower leg: No edema. Left lower leg: No edema. Skin:     General: Skin is warm and dry. Neurological:      General: No focal deficit present. Mental Status: She is alert and oriented to person, place, and time. Psychiatric:         Mood and Affect: Mood normal.         Behavior: Behavior normal.       DIAGNOSTIC RESULTS   LABS:    Labs Reviewed   COMPREHENSIVE METABOLIC PANEL W/ REFLEX TO MG FOR LOW K - Abnormal; Notable for the following components:       Result Value    Potassium reflex Magnesium 3.4 (*)     Anion Gap 18 (*)     Glucose 127 (*)     All other components within normal limits   PROTIME-INR - Abnormal; Notable for the following components:    Protime 17.0 (*)     INR 1.39 (*)     All other components within normal limits   CBC WITH AUTO DIFFERENTIAL   TROPONIN   MAGNESIUM       When ordered, only abnormal lab results are displayed. All other labs were within normal range or not returned as of this dictation. EKG: When ordered, EKG's are interpreted by the Emergency Department Physician in the absence of a cardiologist.  Please see their note for interpretation of EKG. RADIOLOGY:   Non-plain film images such as CT, Ultrasound and MRI are read by the radiologist. Plain radiographic images are visualized andpreliminarily interpreted by the  ED Provider with the below findings:        Interpretation [facility] Radiologist below, if available at the time of this note:    XR CHEST PORTABLE   Final Result   No acute airspace disease identified.            XR CHEST PORTABLE    Result Date: 9/24/2022  EXAMINATION: ONE XRAY VIEW OF THE CHEST 9/24/2022 12:54 am COMPARISON: 08/21/2022 HISTORY: ORDERING SYSTEM PROVIDED HISTORY: palpitations TECHNOLOGIST PROVIDED HISTORY: Reason for exam:->palpitations Reason for Exam: palpitations FINDINGS: The cardiomediastinal silhouette is within normal limits. There is no consolidation, pneumothorax or evidence for edema. No evidence for effusion. No acute osseous abnormality is identified. No acute airspace disease identified. PROCEDURES   Unless otherwise noted below, none     Procedures    CRITICAL CARE TIME   N/A    CONSULTS:  None      EMERGENCY DEPARTMENT COURSE and DIFFERENTIAL DIAGNOSIS/MDM:   Vitals:    Vitals:    09/24/22 0034   BP: (!) 160/78   Pulse: 99   Resp: 18   Temp: 98.6 °F (37 °C)   TempSrc: Oral   SpO2: 99%   Weight: 156 lb 8.4 oz (71 kg)       Patient was given thefollowing medications:  Medications - No data to display      Is this patient to be included in the SEP-1 Core Measure due to severe sepsis or septic shock? No   Exclusion criteria - the patient is NOT to be included for SEP-1 Core Measure due to: Infection is not suspected    This is a 78y.o. year old female with PMH of recently diagnosed A. fib on flecainide, Eliquis, metoprolol--taking medication without difficulty who presents to the ED with complaint of palpitations that has been present for the past 12+ hours. Patient states that this feels similar to when she was first diagnosed with A. fib, however not as uncomfortable. Vitals upon arrival show tachycardic in the 120s, mildly hypertensive, otherwise within normal limits. Throughout patient's stay her tachycardia improved. She was bouncing from the 80s to the 90s. Her blood pressure stayed around 140s over 80s. Physical exam shows as above, atrial fibrillation with RVR at first, improved to a normal rate.    Blood work was performed:  -Within normal limits--troponin is<0.01, a delta troponin was not performed as patient's chest palpitations have been present for the past 12+ hours  -She does have some mild hypokalemia at 3.4, but very mild.  -No signs of dehydration  -No concern for infection at this time  Imaging was ordered and performed and reviewed and read by radiologist as above showing no acute airspace disease identified. Differential diagnosis includes A. fib with RVR, palpitations, ACS, other, sepsis. Medications given: Diltiazem 10 mg  Symptoms improved. Patient states that her palpitations feel better, she is not as uncomfortable. However she did not convert back into normal sinus rhythm. I did give patient the option of being evaluated here in the emergency department and being admitted to the hospital team.  However patient states that she would like to go home and follow-up with her cardiologist.  Since patient seems reliable and has been taking her medications, with no other complaints and no risk for infection at this time. I felt comfortable discharging patient. However I did give her strict return precautions, we had a lengthy discussion about returning to the emergency department if her symptoms persist or new concerning symptoms present themselves such as worsening palpitations, chest pain, lightheadedness, or any other symptoms that are out of the ordinary. Patient was agreeable to this plan. She states that she will have one of her sons come pick her up from the hospital.  She was discharged in stable condition to follow-up with cardiologist.       I am the Primary Clinician of Record. FINAL IMPRESSION      1. Atrial fibrillation, unspecified type ([address]    2. Palpitations          DISPOSITION/PLAN   DISPOSITION Decision To Discharge 09/24/2022 04:14:58 AM      PATIENT REFERREDTO:  No follow-up provider specified.     DISCHARGE MEDICATIONS:  Discharge Medication List as of 9/24/2022  4:32 AM          DISCONTINUED MEDICATIONS:  Discharge Medication List as of 9/24/2022  4:32 AM        STOP taking these medications       Naproxen Sodium (ALEVE) 220 MG CAPS Comments:   Reason for Stopping:                      (Please note that portions ofthis note were completed with a voice recognition program.  Efforts were made to edit the dictations but occasionally words are mis-transcribed.)    Shara Dick PA-C (electronically signed)             Shara Dick PA-C  09/24/22 1988

## 2022-10-04 ENCOUNTER — OFFICE VISIT (OUTPATIENT)
Dept: INTERNAL MEDICINE CLINIC | Age: 77
End: 2022-10-04
Payer: MEDICARE

## 2022-10-04 VITALS
TEMPERATURE: 97.2 F | SYSTOLIC BLOOD PRESSURE: 160 MMHG | BODY MASS INDEX: 27.97 KG/M2 | OXYGEN SATURATION: 99 % | HEIGHT: 62 IN | DIASTOLIC BLOOD PRESSURE: 88 MMHG | WEIGHT: 152 LBS | HEART RATE: 60 BPM

## 2022-10-04 DIAGNOSIS — E55.9 VITAMIN D DEFICIENCY: ICD-10-CM

## 2022-10-04 DIAGNOSIS — I48.91 ATRIAL FIBRILLATION WITH RVR (HCC): ICD-10-CM

## 2022-10-04 DIAGNOSIS — I10 ESSENTIAL HYPERTENSION: ICD-10-CM

## 2022-10-04 DIAGNOSIS — E11.9 TYPE 2 DIABETES MELLITUS WITHOUT COMPLICATION, WITHOUT LONG-TERM CURRENT USE OF INSULIN (HCC): Primary | ICD-10-CM

## 2022-10-04 LAB
ANION GAP SERPL CALCULATED.3IONS-SCNC: 13 MMOL/L (ref 3–16)
BUN BLDV-MCNC: 13 MG/DL (ref 7–20)
CALCIUM SERPL-MCNC: 10.1 MG/DL (ref 8.3–10.6)
CHLORIDE BLD-SCNC: 106 MMOL/L (ref 99–110)
CO2: 24 MMOL/L (ref 21–32)
CREAT SERPL-MCNC: 1 MG/DL (ref 0.6–1.2)
GFR AFRICAN AMERICAN: >60
GFR NON-AFRICAN AMERICAN: 54
GLUCOSE BLD-MCNC: 135 MG/DL (ref 70–99)
POTASSIUM SERPL-SCNC: 4.8 MMOL/L (ref 3.5–5.1)
SODIUM BLD-SCNC: 143 MMOL/L (ref 136–145)
VITAMIN D 25-HYDROXY: 48.2 NG/ML

## 2022-10-04 PROCEDURE — 1123F ACP DISCUSS/DSCN MKR DOCD: CPT | Performed by: INTERNAL MEDICINE

## 2022-10-04 PROCEDURE — 99213 OFFICE O/P EST LOW 20 MIN: CPT | Performed by: INTERNAL MEDICINE

## 2022-10-04 PROCEDURE — 3052F HG A1C>EQUAL 8.0%<EQUAL 9.0%: CPT | Performed by: INTERNAL MEDICINE

## 2022-10-04 ASSESSMENT — PATIENT HEALTH QUESTIONNAIRE - PHQ9
SUM OF ALL RESPONSES TO PHQ QUESTIONS 1-9: 0
SUM OF ALL RESPONSES TO PHQ QUESTIONS 1-9: 0
SUM OF ALL RESPONSES TO PHQ9 QUESTIONS 1 & 2: 0
SUM OF ALL RESPONSES TO PHQ QUESTIONS 1-9: 0
1. LITTLE INTEREST OR PLEASURE IN DOING THINGS: 0
SUM OF ALL RESPONSES TO PHQ QUESTIONS 1-9: 0
2. FEELING DOWN, DEPRESSED OR HOPELESS: 0

## 2022-10-04 NOTE — PROGRESS NOTES
Subjective:      Patient ID: Savita Gamez is a 78 y.o. female    Chief Complaint   Patient presents with    Follow-up     F/u for bp , pt would like to go over her lab results from the hospital        Hypertension  This is a chronic problem. Progression since onset: She does have White [place]at Hypertension. The problem is controlled (She checks her bp at home , and it is well controlled 120-130/80.). Pertinent negatives include no peripheral edema. Risk factors for coronary artery disease include sedentary lifestyle and post-menopausal state. Past treatments include beta blockers, calcium channel blockers and angiotensin blockers. The current treatment provides significant improvement. Diabetes  She presents for her follow-up diabetic visit. She has type 2 diabetes mellitus. Her disease course has been stable. There are no hypoglycemic associated symptoms. There are no hypoglycemic complications. Symptoms are stable. There are no diabetic complications. Current diabetic treatment includes diet. She is compliant with treatment most of the time. She rarely participates in exercise. An ACE inhibitor/angiotensin II receptor blocker is being taken. Current Outpatient Medications on File Prior to Visit   Medication Sig Dispense Refill    metoprolol succinate (TOPROL XL) 25 MG extended release tablet Take 0.5 tablets by mouth daily 45 tablet 1    amLODIPine (NORVASC) 5 MG tablet Take 1 tablet by mouth daily 90 tablet 1    flecainide (TAMBOCOR) 50 MG tablet Take 1 tablet by mouth every 12 hours 60 tablet 1    apixaban (ELIQUIS) 5 MG TABS tablet Take 1 tablet by mouth 2 times daily 60 tablet 1    Cholecalciferol (VITAMIN D) 50 MCG (2000 UT) CAPS capsule Take by mouth      losartan (COZAAR) 25 MG tablet Take 1 tablet by mouth in the morning. DELVIN.  90 tablet 1    [DISCONTINUED] Naproxen Sodium (ALEVE) 220 MG CAPS Take 1 tablet by mouth daily as needed (back or neck pain)       No current facility-administered medications on file prior to visit. Allergies   Allergen Reactions    Cephalosporins     Penicillins     Sulfa Antibiotics        Past Medical History:   Diagnosis Date    Back pain 6/15/2010    Herniated disc 06/01/2000    Chiropractor Dr Oliva Miguel, lumbar , left     Hyperlipidemia     Hypertension     Palpitations 6/15/2010     Past Surgical History:   Procedure Laterality Date    COLONOSCOPY  2011    Liane Lr     Social History     Socioeconomic History    Marital status: Single     Spouse name: Not on file    Number of children: 4    Years of education: Not on file    Highest education level: Not on file   Occupational History     Comment: disabled   Tobacco Use    Smoking status: Never    Smokeless tobacco: Never   Substance and Sexual Activity    Alcohol use: No     Alcohol/week: 0.0 standard drinks    Drug use: No    Sexual activity: Not on file   Other Topics Concern    Not on file   Social History Narrative    Her son got  July 2017. Social Determinants of Health     Financial Resource Strain: Low Risk     Difficulty of Paying Living Expenses: Not hard at all   Food Insecurity: No Food Insecurity    Worried About Running Out of Food in the Last Year: Never true    Ran Out of Food in the Last Year: Never true   Transportation Needs: Not on file   Physical Activity: Insufficiently Active    Days of Exercise per Week: 3 days    Minutes of Exercise per Session: 30 min   Stress: Not on file   Social Connections: Not on file   Intimate Partner Violence: Not on file   Housing Stability: Not on file     Family History   Problem Relation Age of Onset    Hypertension Sister     Diabetes Sister     Breast Cancer Sister     Diabetes Maternal Aunt     Hypertension Other        There is no immunization history on file for this patient. Review of Systems    Objective:    Physical Exam  Constitutional:       Appearance: Normal appearance. She is well-developed.    Eyes:      Extraocular Movements: Extraocular movements intact. Cardiovascular:      Rate and Rhythm: Normal rate and regular rhythm. Heart sounds: No murmur heard. Pulmonary:      Effort: Pulmonary effort is normal.      Breath sounds: Normal breath sounds. Skin:     General: Skin is warm and dry. Findings: No rash. Neurological:      Mental Status: She is oriented to person, place, and time. Psychiatric:         Mood and Affect: Mood normal.     Vitals:    10/04/22 1002   BP: (!) 160/88   Pulse:    Temp:    SpO2:        Assessment and plan       1. Type 2 diabetes mellitus without complication, without long-term current use of insulin (LT[facility], [facility])  Stable. Check labs. 2. Essential hypertension  Stable. Check potassium.  - Basic Metabolic Panel; Future    3. Atrial fibrillation with RVR (HCC)  Stable. Continue blood thinner. 4. Vitamin D deficiency  Stable. Check vitamin D level. - Vitamin D 25 Hydroxy;  Future

## 2022-10-17 ENCOUNTER — TELEPHONE (OUTPATIENT)
Dept: CARDIOLOGY CLINIC | Age: 77
End: 2022-10-17

## 2022-10-17 RX ORDER — FLECAINIDE ACETATE 50 MG/1
25 TABLET ORAL EVERY 12 HOURS SCHEDULED
Qty: 60 TABLET | Refills: 1 | Status: SHIPPED
Start: 2022-10-17 | End: 2022-10-20

## 2022-10-17 NOTE — TELEPHONE ENCOUNTER
Ewelina Eddie called in this afternoon stating that she felt worn out and noticed that it is when she was exercising or walking. She is inquiring that it could be her heart medications. She wants to know if her medications can be adjusted considering that she normally has reactions to taking a lot of medications at once.     Ewelina Eddie can be reached at: [phone]

## 2022-10-17 NOTE — TELEPHONE ENCOUNTER
The patient's symptoms of fatigue and difficulty in exercising is most likely attributable to the Flecainide and the Toprol XL medications that we started. We can cut down the Flecainide to 25mg po BID and Toprol XL remains at 12.5mg po daily and see if that is helpful in at least mitigating her symptoms. She is expected to have some fatigue and dyspnea with exertion while she is on any AAD and AV jade blocking agents.

## 2022-10-17 NOTE — TELEPHONE ENCOUNTER
Patient calling stating that she is having increased fatigue and having a hard time exercising. She does not feel like her heart rhythm is irregular. Blood pressure is controlled. She is unsure what she should do. Do you have any further recommendations?

## 2022-10-17 NOTE — TELEPHONE ENCOUNTER
Patient notified of recommendations and mediation list updated to reflect changes. Patient verbalized understanding.

## 2022-10-17 NOTE — TELEPHONE ENCOUNTER
Rosanna's daughter Diamond Barnes called the office this afternoon wanting to talk to Dr. Francine Roblero or his RN. She has some concerns regarding her BP, and just over all health.     You can reach Diamond Barnes at #[phone]

## 2022-10-19 NOTE — TELEPHONE ENCOUNTER
Patient called in requesting refill for the following medication:      apixaban (ELIQUIS) 5 MG TABS tablet        flecainide (TAMBOCOR) 50 MG tablet      [address] [facility] [address] [place], [place] - [address] [place] [address] [phone]        [facility] advise.

## 2022-10-20 DIAGNOSIS — I48.91 ATRIAL FIBRILLATION WITH RVR (HCC): Primary | ICD-10-CM

## 2022-10-20 RX ORDER — FLECAINIDE ACETATE 50 MG/1
25 TABLET ORAL 2 TIMES DAILY
Qty: 60 TABLET | Refills: 2 | Status: SHIPPED | OUTPATIENT
Start: 2022-10-20 | End: 2022-10-20

## 2022-10-20 RX ORDER — FLECAINIDE ACETATE 50 MG/1
50 TABLET ORAL 2 TIMES DAILY
Qty: 60 TABLET | Refills: 1 | Status: SHIPPED | OUTPATIENT
Start: 2022-10-20

## 2022-10-24 NOTE — TELEPHONE ENCOUNTER
Dr. Snehal Montemayor,   Please review patient's complaints below and advise if cause could possibly be the Flecainide -- Tinnitus (1% to 3%) per Up to Date? Any further recs at this time?

## 2022-10-24 NOTE — TELEPHONE ENCOUNTER
Carlo Lopez called in this morning, she would like to know what is causing her to have ringing in her ears she states it gets worse at night she states it didn't start until she started taking the flecainide and the Eliquis. She can be reached at [phone].

## 2022-10-24 NOTE — TELEPHONE ENCOUNTER
The ear ringing sensation could certainly be attributable to Flecainide. If bothersome, we can stop the Flecainide and instead prescribe Rythmol 150mg po TID.

## 2022-10-24 NOTE — TELEPHONE ENCOUNTER
Called and spoke to patient and let her know of Dr Tu Seaman recommendations. She says that she wants to get some ear drops over the counter before she decides if she wants to switch medications. She will call back if she wants the medication switched.

## 2022-11-16 ENCOUNTER — TELEPHONE (OUTPATIENT)
Dept: CARDIOLOGY CLINIC | Age: 77
End: 2022-11-16

## 2022-11-16 NOTE — TELEPHONE ENCOUNTER
Kelly Kaykay called in this morning stating that she is having a lot of back pain and wants to know what she can take for pain? She said she would take Tylenol, but it doesn't help her. She also states since being on the blood thinner, she is constantly cold. She said she tries everything to warm up.     You can reach Kelly Mccracken at #[phone]

## 2022-11-16 NOTE — TELEPHONE ENCOUNTER
Discussed with [facility] RN, states patient can take Aleve to help the pain for a couple days, just not long term. I called and spoke to [facility] and made her aware. Instructed to call for further questions or concerns.

## 2022-11-18 ENCOUNTER — TELEPHONE (OUTPATIENT)
Dept: INTERNAL MEDICINE CLINIC | Age: 77
End: 2022-11-18

## 2022-11-18 NOTE — TELEPHONE ENCOUNTER
Patient called in requesting medication for a tickle in throat and small cough and slight runny nose. Patient has not taken anything over the counter due to BP. This has been going on for 2 days. Patient has not taken covid test.         [address] [place] [address] [address], [address] [facility] [phone]      Pls call and advise.

## 2022-11-21 DIAGNOSIS — I10 ESSENTIAL HYPERTENSION: ICD-10-CM

## 2022-11-21 NOTE — TELEPHONE ENCOUNTER
Pt requesting refills of    losartan (COZAAR) 25 MG tablet    amLODIPine (NORVASC) 5 MG tablet    metoprolol succinate (TOPROL XL) 25 MG extended release tablet      Sent to [address] [place], [place] [address], [address] [place] [address] [phone]

## 2022-11-21 NOTE — TELEPHONE ENCOUNTER
Pharmacy associate at [facility] stated to me that pt's Metoprolol and Amlodipine is ready for pickup. Losartan was sent to [address] [place] [address] on [address] [facility]. Pt is requesting to get Losartan sent to [facility].      losartan (COZAAR) 25 MG tablet     [address] [place] [address] - [address] [place] [address]   Phone:  [phone]  Fax:  [phone]

## 2022-11-21 NOTE — TELEPHONE ENCOUNTER
Left pt vm to call back as she is not due for refill on Losartan until 02/2023, Metoprolol and Amlodipine until 03/2023

## 2022-11-28 RX ORDER — LOSARTAN POTASSIUM 25 MG/1
25 TABLET ORAL DAILY
Qty: 90 TABLET | Refills: 1 | Status: SHIPPED | OUTPATIENT
Start: 2022-11-28

## 2022-12-12 ENCOUNTER — TELEPHONE (OUTPATIENT)
Dept: INTERNAL MEDICINE CLINIC | Age: 77
End: 2022-12-12

## 2022-12-12 NOTE — TELEPHONE ENCOUNTER
She could purchase a supply from [facility] of the white pills and go back to them, but the insurance will probably not cover the medication twice.

## 2022-12-12 NOTE — TELEPHONE ENCOUNTER
Patient called, the  of     losartan (COZAAR) 25 MG tablet    Was changed. She was taking a white pill (from [facility]) before, now it is blue (from [facility]) and she is feeling funny. Feeling fatigued and weak. She forgot to check the [facility] tablets before she left the pharmacy. She has reactions to different medicines and she is concerned that this might be happening now with the blue tablets. Please advise what she should do at this time.     [address] [address] [address], [address] [phone] - F [phone]     Please call and advise

## 2023-01-03 DIAGNOSIS — I10 ESSENTIAL HYPERTENSION: ICD-10-CM

## 2023-01-03 RX ORDER — LOSARTAN POTASSIUM 25 MG/1
25 TABLET ORAL DAILY
Qty: 90 TABLET | Refills: 2 | Status: SHIPPED | OUTPATIENT
Start: 2023-01-03

## 2023-01-03 NOTE — TELEPHONE ENCOUNTER
Pt needs a 90 day script of losartan (COZAAR) 25 MG tablet        [address] [facility] [address], [address] [facility] [phone]

## 2023-01-12 RX ORDER — APIXABAN 5 MG/1
TABLET, FILM COATED ORAL
Qty: 60 TABLET | Refills: 5 | Status: SHIPPED | OUTPATIENT
Start: 2023-01-12

## 2023-02-28 RX ORDER — METOPROLOL SUCCINATE 25 MG/1
TABLET, EXTENDED RELEASE ORAL
Qty: 45 TABLET | Refills: 1 | Status: SHIPPED | OUTPATIENT
Start: 2023-02-28

## 2023-02-28 RX ORDER — AMLODIPINE BESYLATE 5 MG/1
TABLET ORAL
Qty: 90 TABLET | Refills: 1 | Status: SHIPPED | OUTPATIENT
Start: 2023-02-28

## 2023-04-11 DIAGNOSIS — E11.9 TYPE 2 DIABETES MELLITUS WITHOUT COMPLICATION, WITHOUT LONG-TERM CURRENT USE OF INSULIN (HCC): ICD-10-CM

## 2023-04-11 DIAGNOSIS — E78.2 MIXED HYPERLIPIDEMIA: ICD-10-CM

## 2023-04-11 DIAGNOSIS — R53.83 OTHER FATIGUE: ICD-10-CM

## 2023-04-11 PROBLEM — E11.65 TYPE 2 DIABETES MELLITUS WITH HYPERGLYCEMIA (HCC): Status: ACTIVE | Noted: 2023-04-11

## 2023-04-11 LAB
BASOPHILS # BLD: 0 K/UL (ref 0–0.2)
BASOPHILS NFR BLD: 0.9 %
DEPRECATED RDW RBC AUTO: 15.1 % (ref 12.4–15.4)
EOSINOPHIL # BLD: 0 K/UL (ref 0–0.6)
EOSINOPHIL NFR BLD: 1 %
HCT VFR BLD AUTO: 42.7 % (ref 36–48)
HGB BLD-MCNC: 13.9 G/DL (ref 12–16)
LYMPHOCYTES # BLD: 1.8 K/UL (ref 1–5.1)
LYMPHOCYTES NFR BLD: 41.3 %
MCH RBC QN AUTO: 29.3 PG (ref 26–34)
MCHC RBC AUTO-ENTMCNC: 32.7 G/DL (ref 31–36)
MCV RBC AUTO: 89.8 FL (ref 80–100)
MONOCYTES # BLD: 0.3 K/UL (ref 0–1.3)
MONOCYTES NFR BLD: 6.2 %
NEUTROPHILS # BLD: 2.2 K/UL (ref 1.7–7.7)
NEUTROPHILS NFR BLD: 50.6 %
PLATELET # BLD AUTO: 168 K/UL (ref 135–450)
PMV BLD AUTO: 9.4 FL (ref 5–10.5)
RBC # BLD AUTO: 4.76 M/UL (ref 4–5.2)
WBC # BLD AUTO: 4.3 K/UL (ref 4–11)

## 2023-04-12 LAB
ALBUMIN SERPL-MCNC: 4.8 G/DL (ref 3.4–5)
ALBUMIN/GLOB SERPL: 1.7 {RATIO} (ref 1.1–2.2)
ALP SERPL-CCNC: 101 U/L (ref 40–129)
ALT SERPL-CCNC: 21 U/L (ref 10–40)
ANION GAP SERPL CALCULATED.3IONS-SCNC: 17 MMOL/L (ref 3–16)
AST SERPL-CCNC: 21 U/L (ref 15–37)
BILIRUB SERPL-MCNC: 0.8 MG/DL (ref 0–1)
BUN SERPL-MCNC: 12 MG/DL (ref 7–20)
CALCIUM SERPL-MCNC: 9.8 MG/DL (ref 8.3–10.6)
CHLORIDE SERPL-SCNC: 105 MMOL/L (ref 99–110)
CHOLEST SERPL-MCNC: 243 MG/DL (ref 0–199)
CO2 SERPL-SCNC: 22 MMOL/L (ref 21–32)
CREAT SERPL-MCNC: 0.8 MG/DL (ref 0.6–1.2)
EST. AVERAGE GLUCOSE BLD GHB EST-MCNC: 142.7 MG/DL
GFR SERPLBLD CREATININE-BSD FMLA CKD-EPI: >60 ML/MIN/{1.73_M2}
GLUCOSE SERPL-MCNC: 116 MG/DL (ref 70–99)
HBA1C MFR BLD: 6.6 %
HDLC SERPL-MCNC: 76 MG/DL (ref 40–60)
LDLC SERPL CALC-MCNC: 152 MG/DL
POTASSIUM SERPL-SCNC: 3.7 MMOL/L (ref 3.5–5.1)
PROT SERPL-MCNC: 7.6 G/DL (ref 6.4–8.2)
SODIUM SERPL-SCNC: 144 MMOL/L (ref 136–145)
TRIGL SERPL-MCNC: 73 MG/DL (ref 0–150)
TSH SERPL DL<=0.005 MIU/L-ACNC: 1.19 UIU/ML (ref 0.27–4.2)
VLDLC SERPL CALC-MCNC: 15 MG/DL

## 2023-04-17 DIAGNOSIS — I48.91 ATRIAL FIBRILLATION WITH RVR (HCC): ICD-10-CM

## 2023-04-17 RX ORDER — FLECAINIDE ACETATE 50 MG/1
TABLET ORAL
Qty: 60 TABLET | Refills: 0 | Status: SHIPPED | OUTPATIENT
Start: 2023-04-17 | End: 2023-04-21 | Stop reason: SDUPTHER

## 2023-04-19 ENCOUNTER — TELEPHONE (OUTPATIENT)
Dept: INTERNAL MEDICINE CLINIC | Age: 78
End: 2023-04-19

## 2023-04-19 NOTE — TELEPHONE ENCOUNTER
I left Mrs Jade Chinchilla a voice mail regarding her medication refill request. The medications should be at the pharmacy with refills from prior orders.

## 2023-04-19 NOTE — TELEPHONE ENCOUNTER
Patient would like a year supply for the following medication:    flecainide (TAMBOCOR) 50 MG tablet      losartan (COZAAR) 25 MG tablet      amLODIPine (NORVASC) 5 MG tablet      metoprolol succinate (TOPROL XL) 25 MG extended release tablet      ELIQUIS 5 MG TABS tablet    [address] [place] [address] [phone]      Pls advise.

## 2023-04-20 DIAGNOSIS — I10 ESSENTIAL HYPERTENSION: ICD-10-CM

## 2023-04-20 DIAGNOSIS — I48.91 ATRIAL FIBRILLATION WITH RVR (HCC): ICD-10-CM

## 2023-04-21 RX ORDER — METOPROLOL SUCCINATE 25 MG/1
12.5 TABLET, EXTENDED RELEASE ORAL DAILY
Qty: 45 TABLET | Refills: 1 | Status: SHIPPED | OUTPATIENT
Start: 2023-04-21

## 2023-04-21 RX ORDER — AMLODIPINE BESYLATE 5 MG/1
5 TABLET ORAL DAILY
Qty: 90 TABLET | Refills: 1 | Status: SHIPPED | OUTPATIENT
Start: 2023-04-21

## 2023-04-21 RX ORDER — FLECAINIDE ACETATE 50 MG/1
50 TABLET ORAL 2 TIMES DAILY
Qty: 60 TABLET | Refills: 3 | Status: SHIPPED | OUTPATIENT
Start: 2023-04-21

## 2023-04-21 RX ORDER — LOSARTAN POTASSIUM 25 MG/1
25 TABLET ORAL DAILY
Qty: 90 TABLET | Refills: 1 | Status: SHIPPED | OUTPATIENT
Start: 2023-04-21

## 2023-05-16 ENCOUNTER — TELEPHONE (OUTPATIENT)
Dept: INTERNAL MEDICINE CLINIC | Age: 78
End: 2023-05-16

## 2023-05-16 NOTE — TELEPHONE ENCOUNTER
Yes, I can give her a jury duty excuse . We may need the information off of her jury duty letter to put it in her excuse.

## 2023-05-16 NOTE — TELEPHONE ENCOUNTER
----- Message from Isa Mellow sent at 5/16/2023  8:32 AM EDT -----  Subject: Message to Provider    QUESTIONS  Information for Provider? Patient is calling to see if she can get an   excuse for jury duty, as she can not sit for long periods of time. Please   call   ---------------------------------------------------------------------------  --------------  Emeka Vargas INFO  [phone]; OK to leave message on voicemail  ---------------------------------------------------------------------------  --------------  SCRIPT ANSWERS  Relationship to Patient?  Self

## 2023-05-16 NOTE — TELEPHONE ENCOUNTER
I need the letter or a copy of the letter to add her identifying information to her excuse. She can bring it in or drop it off.

## 2023-05-16 NOTE — TELEPHONE ENCOUNTER
Spoke with pt/. Serafin Sofia duty is 6/12/23.  She doesn't know how long its going to last. Says she could bring in paper for you to see for excuse

## 2023-06-15 ENCOUNTER — TELEPHONE (OUTPATIENT)
Dept: FAMILY MEDICINE CLINIC | Age: 78
End: 2023-06-15

## 2023-06-15 NOTE — TELEPHONE ENCOUNTER
----- Message from Malathi Lim, [address] sent at 6/15/2023 11:13 AM EDT -----  Subject: Appointment Request    Reason for Call: New Patient/New to Provider Appointment needed: New   Patient Request Appointment    QUESTIONS    Reason for appointment request? No appointments available during search     Additional Information for Provider? pt calling in to request appt due to   having to r/s for provider being out of the office.   ---------------------------------------------------------------------------  --------------  Jw GRIFFITHS  [phone]; OK to leave message on voicemail  ---------------------------------------------------------------------------  --------------  SCRIPT ANSWERS

## 2023-06-30 ENCOUNTER — HOSPITAL ENCOUNTER (OUTPATIENT)
Dept: WOMENS IMAGING | Age: 78
Discharge: HOME | End: 2023-06-30
Payer: MEDICARE

## 2023-06-30 VITALS — WEIGHT: 143 LBS | BODY MASS INDEX: 26.16 KG/M2

## 2023-06-30 DIAGNOSIS — Z12.31 VISIT FOR SCREENING MAMMOGRAM: ICD-10-CM

## 2023-06-30 PROCEDURE — 77063 BREAST TOMOSYNTHESIS BI: CPT

## 2023-08-17 ENCOUNTER — OFFICE VISIT (OUTPATIENT)
Dept: FAMILY MEDICINE CLINIC | Age: 78
End: 2023-08-17
Payer: MEDICARE

## 2023-08-17 VITALS
BODY MASS INDEX: 28.45 KG/M2 | RESPIRATION RATE: 18 BRPM | DIASTOLIC BLOOD PRESSURE: 84 MMHG | SYSTOLIC BLOOD PRESSURE: 136 MMHG | WEIGHT: 154.6 LBS | HEIGHT: 62 IN | TEMPERATURE: 98.3 F | HEART RATE: 87 BPM | OXYGEN SATURATION: 98 %

## 2023-08-17 DIAGNOSIS — E11.65 TYPE 2 DIABETES MELLITUS WITH HYPERGLYCEMIA, WITHOUT LONG-TERM CURRENT USE OF INSULIN (HCC): Primary | ICD-10-CM

## 2023-08-17 DIAGNOSIS — I10 ESSENTIAL HYPERTENSION: ICD-10-CM

## 2023-08-17 DIAGNOSIS — E78.2 MIXED HYPERLIPIDEMIA: ICD-10-CM

## 2023-08-17 DIAGNOSIS — I48.0 PAF (PAROXYSMAL ATRIAL FIBRILLATION) (HCC): ICD-10-CM

## 2023-08-17 DIAGNOSIS — E11.65 TYPE 2 DIABETES MELLITUS WITH HYPERGLYCEMIA, WITHOUT LONG-TERM CURRENT USE OF INSULIN (HCC): ICD-10-CM

## 2023-08-17 LAB — HBA1C MFR BLD: 7.1 %

## 2023-08-17 PROCEDURE — 3078F DIAST BP <80 MM HG: CPT | Performed by: NURSE PRACTITIONER

## 2023-08-17 PROCEDURE — 83037 HB GLYCOSYLATED A1C HOME DEV: CPT | Performed by: NURSE PRACTITIONER

## 2023-08-17 PROCEDURE — 93000 ELECTROCARDIOGRAM COMPLETE: CPT | Performed by: NURSE PRACTITIONER

## 2023-08-17 PROCEDURE — 3074F SYST BP LT 130 MM HG: CPT | Performed by: NURSE PRACTITIONER

## 2023-08-17 PROCEDURE — 1123F ACP DISCUSS/DSCN MKR DOCD: CPT | Performed by: NURSE PRACTITIONER

## 2023-08-17 PROCEDURE — 3051F HG A1C>EQUAL 7.0%<8.0%: CPT | Performed by: NURSE PRACTITIONER

## 2023-08-17 PROCEDURE — 99214 OFFICE O/P EST MOD 30 MIN: CPT | Performed by: NURSE PRACTITIONER

## 2023-08-18 LAB
ALBUMIN SERPL-MCNC: 4.6 G/DL (ref 3.4–5)
ALBUMIN/GLOB SERPL: 1.7 {RATIO} (ref 1.1–2.2)
ALP SERPL-CCNC: 93 U/L (ref 40–129)
ALT SERPL-CCNC: 18 U/L (ref 10–40)
ANION GAP SERPL CALCULATED.3IONS-SCNC: 14 MMOL/L (ref 3–16)
AST SERPL-CCNC: 20 U/L (ref 15–37)
BILIRUB SERPL-MCNC: 1 MG/DL (ref 0–1)
BUN SERPL-MCNC: 18 MG/DL (ref 7–20)
CALCIUM SERPL-MCNC: 10 MG/DL (ref 8.3–10.6)
CHLORIDE SERPL-SCNC: 101 MMOL/L (ref 99–110)
CO2 SERPL-SCNC: 25 MMOL/L (ref 21–32)
CREAT SERPL-MCNC: 0.9 MG/DL (ref 0.6–1.2)
GFR SERPLBLD CREATININE-BSD FMLA CKD-EPI: >60 ML/MIN/{1.73_M2}
GLUCOSE SERPL-MCNC: 111 MG/DL (ref 70–99)
POTASSIUM SERPL-SCNC: 4 MMOL/L (ref 3.5–5.1)
PROT SERPL-MCNC: 7.3 G/DL (ref 6.4–8.2)
SODIUM SERPL-SCNC: 140 MMOL/L (ref 136–145)

## 2023-08-21 ENCOUNTER — TELEPHONE (OUTPATIENT)
Dept: FAMILY MEDICINE CLINIC | Age: 78
End: 2023-08-21

## 2023-08-21 NOTE — TELEPHONE ENCOUNTER
Patient is returning a call to Davide Sg    If she does not answer please leave a message    Please Advise

## 2023-08-22 ASSESSMENT — ENCOUNTER SYMPTOMS
ABDOMINAL PAIN: 0
SHORTNESS OF BREATH: 0
COUGH: 0
BLOOD IN STOOL: 0

## 2023-08-31 ENCOUNTER — OFFICE VISIT (OUTPATIENT)
Dept: CARDIOLOGY CLINIC | Age: 78
End: 2023-08-31
Payer: MEDICARE

## 2023-08-31 VITALS
DIASTOLIC BLOOD PRESSURE: 80 MMHG | BODY MASS INDEX: 29.11 KG/M2 | OXYGEN SATURATION: 99 % | HEART RATE: 50 BPM | SYSTOLIC BLOOD PRESSURE: 130 MMHG | HEIGHT: 62 IN | WEIGHT: 158.2 LBS

## 2023-08-31 DIAGNOSIS — I10 ESSENTIAL HYPERTENSION: ICD-10-CM

## 2023-08-31 DIAGNOSIS — Z79.01 CURRENT USE OF ANTICOAGULANT THERAPY: ICD-10-CM

## 2023-08-31 DIAGNOSIS — I48.3 TYPICAL ATRIAL FLUTTER (HCC): ICD-10-CM

## 2023-08-31 DIAGNOSIS — I48.0 PAF (PAROXYSMAL ATRIAL FIBRILLATION) (HCC): Primary | ICD-10-CM

## 2023-08-31 DIAGNOSIS — I48.91 ATRIAL FIBRILLATION WITH RVR (HCC): ICD-10-CM

## 2023-08-31 PROCEDURE — 93000 ELECTROCARDIOGRAM COMPLETE: CPT | Performed by: INTERNAL MEDICINE

## 2023-08-31 PROCEDURE — 1123F ACP DISCUSS/DSCN MKR DOCD: CPT | Performed by: INTERNAL MEDICINE

## 2023-08-31 PROCEDURE — 3079F DIAST BP 80-89 MM HG: CPT | Performed by: INTERNAL MEDICINE

## 2023-08-31 PROCEDURE — 99215 OFFICE O/P EST HI 40 MIN: CPT | Performed by: INTERNAL MEDICINE

## 2023-08-31 PROCEDURE — 3075F SYST BP GE 130 - 139MM HG: CPT | Performed by: INTERNAL MEDICINE

## 2023-08-31 RX ORDER — FLECAINIDE ACETATE 50 MG/1
25 TABLET ORAL 2 TIMES DAILY
Qty: 90 TABLET | Refills: 0
Start: 2023-08-31

## 2023-08-31 NOTE — PROGRESS NOTES
Cardiac Electrophysiology Consultation   Date: 8/31/2023  Reason for Consultation: Atrial fibrillation / Typical atrial flutter  Consult Requesting Physician: RODRICK Hdz CNP  Primary Care Physician: RODRICK Hdz CNP    Chief Complaint:   Chief Complaint   Patient presents with    Follow-up      HPI: Marlo Greenberg is a 70 y.o. patient with a history of essential hypertension, hyperlipidemia and atrial fibrillation. She presented to [facility] ED on 8/21/2022 reporting symptoms of epigastric pain and a lot of gas causing belching and flatulence. She stated that when she arrived to the ED, she started to have palpitations that were fast and irregular, denies ever having these in the past.   She was noted to be in atrial fibrillation with v-rates in the 130-150s, she was started on a diltiazem drip with improvement in her v-rates, now in the 90s-100s. She still notes palpitations but says the epigastric discomfort has resolved. Denies any increased dyspnea, fatigue, edema or syncope. On 8/23/2022 she underwent attempt at KAYLEN CV but  but we were not able to pass the KAYLEN probe down into the esophagus because the patient's oropharynx and pharynx were tense from patient's resistance, even with Versed, Fentanyl, and Methohexital.     She was started on Eliquis 5 mg BID for [id number] Score 5 for thromboembolic risk reduction. She was started on Flecainide and Toprol XL for rate and rhythm control. Ablation was discussed as a treatment option but she did not chose to proceed. She was seen in office on 08/18/2023 by her PCP RODRICK Hdz CNP  and EKG showed atrial fibrillation and atrial flutter. She reports that flecainide was causing her to feel sick and nauseous so he decreased to 25 mg BID. Interval History: Today, she presents to office accompanied by her sons and her daughter on the phone hospital follow for management of atrial fibrillation.

## 2023-08-31 NOTE — PATIENT INSTRUCTIONS
CTA Pre procedure instructions       As part of your pre procedure work up you will need to get a CT scan of your heart. This scan is completed in order to give Dr. Thuy Galdamez a map of the atrium (chamber of your heart) where he will be doing the ablation. Date:_________________________________     Time:_________________________________     Juan Si 20 minutes prior to scheduled testing time  -Nothing to eat or drink for at least 4 hours prior to test     Atrial Fibrillation Ablation & Right Atrial Flutter Pre procedure Instructions     Date:______________________________     Juan Si at:__________________________     Procedure time:____________________     The morning of your procedure you will park in the hospital parking lot and report directly to the cath lab to check in. At the information desk stay right and go all the way to the end of the damian, this will take you directly to your check in desk for the cath lab. Pre-Procedure Instructions   You will need to fast (nothing to eat or drink) after midnight the day of your procedure  Do NOT chew gum or eat mints the day of your procedure. You will need to hold your Flecainide for 7 days prior to the procedure. You will need to hold your Eliquis for 12 hours prior to the procedure. You may take your Losartan and amlodipine the morning of the procedure with sips of water if you typically take at this time. Do not use any lotions, creams or perfume the morning of procedure. You will need to complete pre-procedure lab work 5-7 days prior to your procedure. Please have a responsible adult to drive you home after procedure, you should go home same day, but there is always a possibility of an overnight stay.   Cath lab will provide you with all post procedure instructions  A 3 month follow up will be scheduled with Dr. Caleb Dumont Nurse practitioner Samuel November, CNP post procedure                                          [facility]

## 2023-09-01 ENCOUNTER — TELEPHONE (OUTPATIENT)
Dept: CARDIOLOGY CLINIC | Age: 78
End: 2023-09-01

## 2023-09-01 DIAGNOSIS — I48.0 PAF (PAROXYSMAL ATRIAL FIBRILLATION) (HCC): Primary | ICD-10-CM

## 2023-09-01 DIAGNOSIS — Z79.01 CURRENT USE OF ANTICOAGULANT THERAPY: ICD-10-CM

## 2023-09-01 DIAGNOSIS — I48.3 TYPICAL ATRIAL FLUTTER (HCC): ICD-10-CM

## 2023-09-01 DIAGNOSIS — I51.89 DIASTOLIC DYSFUNCTION: ICD-10-CM

## 2023-09-01 DIAGNOSIS — I51.7 LEFT ATRIAL DILATATION: ICD-10-CM

## 2023-09-14 NOTE — TELEPHONE ENCOUNTER
Gaye Court called to speak to [facility].     Gaye Court  [phone]
LVM for patient to return call to schedule procedure.
Pt called back in and has some questions for Paige.     Pablito Humphrey # [phone]
Spoke with patient all questions answered. She is ready to proceed with scheduling.
Spoke with the patient and got her scheduled for procedures. We went over instructions and I called her Son Donald Snyder as well. CTA Pre procedure instructions       As part of your pre procedure work up you will need to get a CT scan of your heart. This scan is completed in order to give Dr. Ra Mcgraw a map of the atrium (chamber of your heart) where he will be doing the ablation. Date: 10/10/2023     Time: 9:20 am, arrive by 9:00 am - do blood work prior      -Arrive 20 minutes prior to scheduled testing time  -Nothing to eat or drink for at least 4 hours prior to test  -No Caffeine 12 hours prior      Atrial Fibrillation Ablation & Right Atrial Flutter Pre procedure Instructions     Date: 10/17/2023     Arrive at: 9:00 am   Procedure time: 10:30 am      The morning of your procedure you will park in the hospital parking lot and report directly to the cath lab to check in. At the information desk stay right and go all the way to the end of the damian, this will take you directly to your check in desk for the cath lab. Pre-Procedure Instructions   You will need to fast (nothing to eat or drink) after midnight the day of your procedure  Do NOT chew gum or eat mints the day of your procedure. You will need to hold your Flecainide for 7 days prior to the procedure. You will need to hold your Eliquis for 12 hours prior to the procedure. You may take your Losartan and amlodipine the morning of the procedure with sips of water if you typically take at this time. Do not use any lotions, creams or perfume the morning of procedure. You will need to complete pre-procedure lab work 5-7 days prior to your procedure. Please have a responsible adult to drive you home after procedure, you should go home same day, but there is always a possibility of an overnight stay.   Cath lab will provide you with all post procedure instructions  A 3 month follow up will be scheduled with Dr. Deonna Tirado Nurse practitioner Elsa Winters
Spoke with the patient and if she moves procedure she would have to make 3 trips out for the procedure. She is going to keep as is and if changes mind will call me back.
Ulisses Don is calling this morning stating she was Dr. Ra Mcgraw in office on 8/31/23 and has decided that she does want to proceed with an Ablation    Ulisses Don can be reached at  [phone]
Unruly Egan is returning [place] call from earlier
[facility] Lab services  [address] [place] [address]   [address], [address] [facility]  Phone: [phone]  The hours are Mon -Fri. 6:30 am - 4:00 pm   Saturday 8:00 am - noon  No appointment necessary          orders place and no hx of iodine allergy.

## 2023-10-02 ENCOUNTER — TELEPHONE (OUTPATIENT)
Dept: CARDIOLOGY CLINIC | Age: 78
End: 2023-10-02

## 2023-10-02 NOTE — TELEPHONE ENCOUNTER
Needs to have CTA/Blood work on 10/10 but can move up after that. [facility] She can't move up currently

## 2023-10-09 DIAGNOSIS — I48.0 PAF (PAROXYSMAL ATRIAL FIBRILLATION) (HCC): Primary | ICD-10-CM

## 2023-10-10 ENCOUNTER — HOSPITAL ENCOUNTER (OUTPATIENT)
Dept: CT IMAGING | Age: 78
Discharge: HOME | End: 2023-10-10
Payer: MEDICARE

## 2023-10-10 DIAGNOSIS — I48.3 TYPICAL ATRIAL FLUTTER (HCC): ICD-10-CM

## 2023-10-10 DIAGNOSIS — Z79.01 CURRENT USE OF ANTICOAGULANT THERAPY: ICD-10-CM

## 2023-10-10 DIAGNOSIS — I51.7 LEFT ATRIAL DILATATION: ICD-10-CM

## 2023-10-10 DIAGNOSIS — I48.0 PAF (PAROXYSMAL ATRIAL FIBRILLATION) (HCC): ICD-10-CM

## 2023-10-10 DIAGNOSIS — I51.89 DIASTOLIC DYSFUNCTION: ICD-10-CM

## 2023-10-10 LAB
ABO + RH BLD: NORMAL
ANION GAP SERPL CALCULATED.3IONS-SCNC: 14 MMOL/L (ref 3–16)
BLD GP AB SCN SERPL QL: NORMAL
BUN SERPL-MCNC: 14 MG/DL (ref 7–20)
CALCIUM SERPL-MCNC: 9.3 MG/DL (ref 8.3–10.6)
CHLORIDE SERPL-SCNC: 103 MMOL/L (ref 99–110)
CO2 SERPL-SCNC: 24 MMOL/L (ref 21–32)
CREAT SERPL-MCNC: 0.8 MG/DL (ref 0.6–1.2)
DEPRECATED RDW RBC AUTO: 14.7 % (ref 12.4–15.4)
GFR SERPLBLD CREATININE-BSD FMLA CKD-EPI: >60 ML/MIN/{1.73_M2}
GLUCOSE SERPL-MCNC: 111 MG/DL (ref 70–99)
HCT VFR BLD AUTO: 43 % (ref 36–48)
HGB BLD-MCNC: 14.2 G/DL (ref 12–16)
INR PPP: 1.31 (ref 0.84–1.16)
MCH RBC QN AUTO: 29.7 PG (ref 26–34)
MCHC RBC AUTO-ENTMCNC: 32.9 G/DL (ref 31–36)
MCV RBC AUTO: 90.1 FL (ref 80–100)
PLATELET # BLD AUTO: 175 K/UL (ref 135–450)
PMV BLD AUTO: 9.8 FL (ref 5–10.5)
POTASSIUM SERPL-SCNC: 3.9 MMOL/L (ref 3.5–5.1)
PROTHROMBIN TIME: 16.2 SEC (ref 11.5–14.8)
RBC # BLD AUTO: 4.77 M/UL (ref 4–5.2)
SODIUM SERPL-SCNC: 141 MMOL/L (ref 136–145)
WBC # BLD AUTO: 4.3 K/UL (ref 4–11)

## 2023-10-10 PROCEDURE — 6360000004 HC RX CONTRAST MEDICATION: Performed by: PHYSICIAN ASSISTANT

## 2023-10-10 PROCEDURE — 75574 CT ANGIO HRT W/3D IMAGE: CPT

## 2023-10-10 RX ADMIN — IOPAMIDOL 75 ML: 755 INJECTION, SOLUTION INTRAVENOUS at 09:02

## 2023-10-16 ENCOUNTER — ANESTHESIA EVENT (OUTPATIENT)
Dept: CARDIAC CATH/INVASIVE PROCEDURES | Age: 78
End: 2023-10-16
Payer: MEDICARE

## 2023-10-17 ENCOUNTER — ANESTHESIA (OUTPATIENT)
Dept: CARDIAC CATH/INVASIVE PROCEDURES | Age: 78
End: 2023-10-17
Payer: MEDICARE

## 2023-10-17 ENCOUNTER — HOSPITAL ENCOUNTER (OUTPATIENT)
Dept: CARDIAC CATH/INVASIVE PROCEDURES | Age: 78
Discharge: HOME | End: 2023-10-17
Payer: MEDICARE

## 2023-10-17 VITALS
SYSTOLIC BLOOD PRESSURE: 133 MMHG | DIASTOLIC BLOOD PRESSURE: 70 MMHG | WEIGHT: 153 LBS | OXYGEN SATURATION: 100 % | HEIGHT: 62 IN | RESPIRATION RATE: 12 BRPM | TEMPERATURE: 96.9 F | BODY MASS INDEX: 28.16 KG/M2 | HEART RATE: 59 BPM

## 2023-10-17 DIAGNOSIS — I48.0 PAF (PAROXYSMAL ATRIAL FIBRILLATION) (HCC): ICD-10-CM

## 2023-10-17 DIAGNOSIS — I48.3 TYPICAL ATRIAL FLUTTER (HCC): ICD-10-CM

## 2023-10-17 DIAGNOSIS — Z79.01 CURRENT USE OF ANTICOAGULANT THERAPY: ICD-10-CM

## 2023-10-17 DIAGNOSIS — I51.7 LEFT ATRIAL DILATATION: ICD-10-CM

## 2023-10-17 DIAGNOSIS — I51.89 DIASTOLIC DYSFUNCTION: ICD-10-CM

## 2023-10-17 LAB
ABO + RH BLD: NORMAL
BLD GP AB SCN SERPL QL: NORMAL
EKG ATRIAL RATE: 59 BPM
EKG DIAGNOSIS: NORMAL
EKG P AXIS: 39 DEGREES
EKG P-R INTERVAL: 140 MS
EKG Q-T INTERVAL: 426 MS
EKG QRS DURATION: 82 MS
EKG QTC CALCULATION (BAZETT): 421 MS
EKG R AXIS: -2 DEGREES
EKG T AXIS: 159 DEGREES
EKG VENTRICULAR RATE: 59 BPM
POC ACT LR: 318 SEC
POC ACT LR: 333 SEC

## 2023-10-17 PROCEDURE — 93655 ICAR CATH ABLTJ DSCRT ARRHYT: CPT | Performed by: INTERNAL MEDICINE

## 2023-10-17 PROCEDURE — 86850 RBC ANTIBODY SCREEN: CPT

## 2023-10-17 PROCEDURE — 86900 BLOOD TYPING SEROLOGIC ABO: CPT

## 2023-10-17 PROCEDURE — 93623 PRGRMD STIMJ&PACG IV RX NFS: CPT

## 2023-10-17 PROCEDURE — 93622 COMP EP EVAL L VENTR PAC&REC: CPT | Performed by: INTERNAL MEDICINE

## 2023-10-17 PROCEDURE — 3700000000 HC ANESTHESIA ATTENDED CARE

## 2023-10-17 PROCEDURE — 86901 BLOOD TYPING SEROLOGIC RH(D): CPT

## 2023-10-17 PROCEDURE — C1893 INTRO/SHEATH, FIXED,NON-PEEL: HCPCS

## 2023-10-17 PROCEDURE — 6360000002 HC RX W HCPCS

## 2023-10-17 PROCEDURE — 93656 COMPRE EP EVAL ABLTJ ATR FIB: CPT | Performed by: INTERNAL MEDICINE

## 2023-10-17 PROCEDURE — 2580000003 HC RX 258

## 2023-10-17 PROCEDURE — 93622 COMP EP EVAL L VENTR PAC&REC: CPT

## 2023-10-17 PROCEDURE — 85347 COAGULATION TIME ACTIVATED: CPT

## 2023-10-17 PROCEDURE — 93657 TX L/R ATRIAL FIB ADDL: CPT

## 2023-10-17 PROCEDURE — C1730 CATH, EP, 19 OR FEW ELECT: HCPCS

## 2023-10-17 PROCEDURE — A4216 STERILE WATER/SALINE, 10 ML: HCPCS

## 2023-10-17 PROCEDURE — 2720000010 HC SURG SUPPLY STERILE

## 2023-10-17 PROCEDURE — 2500000003 HC RX 250 WO HCPCS

## 2023-10-17 PROCEDURE — 93623 PRGRMD STIMJ&PACG IV RX NFS: CPT | Performed by: INTERNAL MEDICINE

## 2023-10-17 PROCEDURE — C1894 INTRO/SHEATH, NON-LASER: HCPCS

## 2023-10-17 PROCEDURE — 93656 COMPRE EP EVAL ABLTJ ATR FIB: CPT

## 2023-10-17 PROCEDURE — 3700000001 HC ADD 15 MINUTES (ANESTHESIA)

## 2023-10-17 PROCEDURE — C1759 CATH, INTRA ECHOCARDIOGRAPHY: HCPCS

## 2023-10-17 PROCEDURE — 2709999900 HC NON-CHARGEABLE SUPPLY

## 2023-10-17 PROCEDURE — 7100000000 HC PACU RECOVERY - FIRST 15 MIN

## 2023-10-17 PROCEDURE — 93655 ICAR CATH ABLTJ DSCRT ARRHYT: CPT

## 2023-10-17 PROCEDURE — 93657 TX L/R ATRIAL FIB ADDL: CPT | Performed by: INTERNAL MEDICINE

## 2023-10-17 PROCEDURE — 7100000001 HC PACU RECOVERY - ADDTL 15 MIN

## 2023-10-17 PROCEDURE — C1732 CATH, EP, DIAG/ABL, 3D/VECT: HCPCS

## 2023-10-17 RX ORDER — HEPARIN SODIUM 10000 [USP'U]/100ML
INJECTION, SOLUTION INTRAVENOUS CONTINUOUS PRN
Status: DISCONTINUED | OUTPATIENT
Start: 2023-10-17 | End: 2023-10-17 | Stop reason: SDUPTHER

## 2023-10-17 RX ORDER — METOPROLOL SUCCINATE 25 MG/1
12.5 TABLET, EXTENDED RELEASE ORAL DAILY
Status: DISCONTINUED | OUTPATIENT
Start: 2023-10-18 | End: 2023-10-18 | Stop reason: HOSPADM

## 2023-10-17 RX ORDER — ROCURONIUM BROMIDE 10 MG/ML
INJECTION, SOLUTION INTRAVENOUS PRN
Status: DISCONTINUED | OUTPATIENT
Start: 2023-10-17 | End: 2023-10-17 | Stop reason: SDUPTHER

## 2023-10-17 RX ORDER — SODIUM CHLORIDE 0.9 % (FLUSH) 0.9 %
5-40 SYRINGE (ML) INJECTION PRN
Status: DISCONTINUED | OUTPATIENT
Start: 2023-10-17 | End: 2023-10-18 | Stop reason: HOSPADM

## 2023-10-17 RX ORDER — PROTAMINE SULFATE 10 MG/ML
INJECTION, SOLUTION INTRAVENOUS PRN
Status: DISCONTINUED | OUTPATIENT
Start: 2023-10-17 | End: 2023-10-17 | Stop reason: SDUPTHER

## 2023-10-17 RX ORDER — SODIUM CHLORIDE 9 MG/ML
INJECTION, SOLUTION INTRAVENOUS PRN
Status: DISCONTINUED | OUTPATIENT
Start: 2023-10-17 | End: 2023-10-18 | Stop reason: HOSPADM

## 2023-10-17 RX ORDER — AMLODIPINE BESYLATE 5 MG/1
5 TABLET ORAL DAILY
Status: DISCONTINUED | OUTPATIENT
Start: 2023-10-18 | End: 2023-10-18 | Stop reason: HOSPADM

## 2023-10-17 RX ORDER — PROTAMINE SULFATE 10 MG/ML
30 INJECTION, SOLUTION INTRAVENOUS
Status: DISCONTINUED | OUTPATIENT
Start: 2023-10-17 | End: 2023-10-18 | Stop reason: HOSPADM

## 2023-10-17 RX ORDER — ONDANSETRON 2 MG/ML
INJECTION INTRAMUSCULAR; INTRAVENOUS PRN
Status: DISCONTINUED | OUTPATIENT
Start: 2023-10-17 | End: 2023-10-17 | Stop reason: SDUPTHER

## 2023-10-17 RX ORDER — DEXAMETHASONE SODIUM PHOSPHATE 4 MG/ML
INJECTION, SOLUTION INTRA-ARTICULAR; INTRALESIONAL; INTRAMUSCULAR; INTRAVENOUS; SOFT TISSUE PRN
Status: DISCONTINUED | OUTPATIENT
Start: 2023-10-17 | End: 2023-10-17 | Stop reason: SDUPTHER

## 2023-10-17 RX ORDER — SODIUM CHLORIDE 0.9 % (FLUSH) 0.9 %
5-40 SYRINGE (ML) INJECTION EVERY 12 HOURS SCHEDULED
Status: DISCONTINUED | OUTPATIENT
Start: 2023-10-17 | End: 2023-10-18 | Stop reason: HOSPADM

## 2023-10-17 RX ORDER — LIDOCAINE HYDROCHLORIDE AND EPINEPHRINE BITARTRATE 20; .01 MG/ML; MG/ML
15 INJECTION, SOLUTION SUBCUTANEOUS SEE ADMIN INSTRUCTIONS
Status: DISCONTINUED | OUTPATIENT
Start: 2023-10-17 | End: 2023-10-18 | Stop reason: HOSPADM

## 2023-10-17 RX ORDER — ACETAMINOPHEN 325 MG/1
650 TABLET ORAL EVERY 4 HOURS PRN
Status: DISCONTINUED | OUTPATIENT
Start: 2023-10-17 | End: 2023-10-18 | Stop reason: HOSPADM

## 2023-10-17 RX ORDER — FENTANYL CITRATE 50 UG/ML
INJECTION, SOLUTION INTRAMUSCULAR; INTRAVENOUS PRN
Status: DISCONTINUED | OUTPATIENT
Start: 2023-10-17 | End: 2023-10-17 | Stop reason: SDUPTHER

## 2023-10-17 RX ORDER — FLECAINIDE ACETATE 50 MG/1
25 TABLET ORAL 2 TIMES DAILY
Status: DISCONTINUED | OUTPATIENT
Start: 2023-10-17 | End: 2023-10-18 | Stop reason: HOSPADM

## 2023-10-17 RX ORDER — 0.9 % SODIUM CHLORIDE 0.9 %
1000 INTRAVENOUS SOLUTION INTRAVENOUS
Status: DISCONTINUED | OUTPATIENT
Start: 2023-10-17 | End: 2023-10-18 | Stop reason: HOSPADM

## 2023-10-17 RX ORDER — DOPAMINE HYDROCHLORIDE 160 MG/100ML
INJECTION, SOLUTION INTRAVENOUS CONTINUOUS PRN
Status: DISCONTINUED | OUTPATIENT
Start: 2023-10-17 | End: 2023-10-17 | Stop reason: SDUPTHER

## 2023-10-17 RX ORDER — PROPOFOL 10 MG/ML
INJECTION, EMULSION INTRAVENOUS PRN
Status: DISCONTINUED | OUTPATIENT
Start: 2023-10-17 | End: 2023-10-17 | Stop reason: SDUPTHER

## 2023-10-17 RX ORDER — SODIUM CHLORIDE 9 MG/ML
INJECTION, SOLUTION INTRAVENOUS CONTINUOUS PRN
Status: DISCONTINUED | OUTPATIENT
Start: 2023-10-17 | End: 2023-10-17 | Stop reason: SDUPTHER

## 2023-10-17 RX ORDER — LOSARTAN POTASSIUM 25 MG/1
25 TABLET ORAL DAILY
Status: DISCONTINUED | OUTPATIENT
Start: 2023-10-18 | End: 2023-10-18 | Stop reason: HOSPADM

## 2023-10-17 RX ORDER — HEPARIN SODIUM 1000 [USP'U]/ML
INJECTION, SOLUTION INTRAVENOUS; SUBCUTANEOUS PRN
Status: DISCONTINUED | OUTPATIENT
Start: 2023-10-17 | End: 2023-10-17 | Stop reason: SDUPTHER

## 2023-10-17 RX ORDER — FUROSEMIDE 10 MG/ML
INJECTION INTRAMUSCULAR; INTRAVENOUS PRN
Status: DISCONTINUED | OUTPATIENT
Start: 2023-10-17 | End: 2023-10-17 | Stop reason: SDUPTHER

## 2023-10-17 RX ADMIN — DOPAMINE HYDROCHLORIDE 10 MCG/KG/MIN: 160 INJECTION, SOLUTION INTRAVENOUS at 13:28

## 2023-10-17 RX ADMIN — HEPARIN SODIUM 7000 UNITS/HR: 10000 INJECTION, SOLUTION INTRAVENOUS at 12:55

## 2023-10-17 RX ADMIN — ROCURONIUM BROMIDE 20 MG: 10 INJECTION INTRAVENOUS at 12:25

## 2023-10-17 RX ADMIN — FUROSEMIDE 50 MG: 10 INJECTION, SOLUTION INTRAVENOUS at 13:59

## 2023-10-17 RX ADMIN — PROPOFOL 50 MG: 10 INJECTION, EMULSION INTRAVENOUS at 12:16

## 2023-10-17 RX ADMIN — ROCURONIUM BROMIDE 20 MG: 10 INJECTION INTRAVENOUS at 13:15

## 2023-10-17 RX ADMIN — HEPARIN SODIUM 3500 UNITS: 1000 INJECTION INTRAVENOUS; SUBCUTANEOUS at 12:30

## 2023-10-17 RX ADMIN — HEPARIN SODIUM 4000 UNITS: 1000 INJECTION INTRAVENOUS; SUBCUTANEOUS at 12:34

## 2023-10-17 RX ADMIN — PROPOFOL 150 MG: 10 INJECTION, EMULSION INTRAVENOUS at 11:44

## 2023-10-17 RX ADMIN — FENTANYL CITRATE 50 MCG: 50 INJECTION INTRAMUSCULAR; INTRAVENOUS at 11:44

## 2023-10-17 RX ADMIN — SUGAMMADEX 300 MG: 100 INJECTION, SOLUTION INTRAVENOUS at 14:01

## 2023-10-17 RX ADMIN — ROCURONIUM BROMIDE 50 MG: 10 INJECTION INTRAVENOUS at 11:44

## 2023-10-17 RX ADMIN — SODIUM CHLORIDE: 9 INJECTION, SOLUTION INTRAVENOUS at 11:36

## 2023-10-17 RX ADMIN — FENTANYL CITRATE 25 MCG: 50 INJECTION INTRAMUSCULAR; INTRAVENOUS at 13:44

## 2023-10-17 RX ADMIN — DEXAMETHASONE SODIUM PHOSPHATE 4 MG: 4 INJECTION, SOLUTION INTRAMUSCULAR; INTRAVENOUS at 12:03

## 2023-10-17 RX ADMIN — FENTANYL CITRATE 25 MCG: 50 INJECTION INTRAMUSCULAR; INTRAVENOUS at 12:48

## 2023-10-17 RX ADMIN — FENTANYL CITRATE 50 MCG: 50 INJECTION INTRAMUSCULAR; INTRAVENOUS at 12:15

## 2023-10-17 RX ADMIN — FENTANYL CITRATE 25 MCG: 50 INJECTION INTRAMUSCULAR; INTRAVENOUS at 12:57

## 2023-10-17 RX ADMIN — PROPOFOL 50 MG: 10 INJECTION, EMULSION INTRAVENOUS at 11:39

## 2023-10-17 RX ADMIN — PROTAMINE SULFATE 125 MG: 10 INJECTION, SOLUTION INTRAVENOUS at 14:01

## 2023-10-17 RX ADMIN — FENTANYL CITRATE 25 MCG: 50 INJECTION INTRAMUSCULAR; INTRAVENOUS at 12:53

## 2023-10-17 RX ADMIN — ONDANSETRON 4 MG: 2 INJECTION INTRAMUSCULAR; INTRAVENOUS at 13:46

## 2023-10-17 ASSESSMENT — LIFESTYLE VARIABLES: SMOKING_STATUS: 0

## 2023-10-17 ASSESSMENT — ENCOUNTER SYMPTOMS: SHORTNESS OF BREATH: 0

## 2023-10-17 NOTE — PROGRESS NOTES
1455 Returned to room from PACU. Purewick in place, voiding clear yellow urine. Voided 500 ml in PACU. Dr. Tawnya Mcgraw at bedside discussing results of procedure with patient and family. Groin site soft, no drainage. Taking ice chips without difficulty. 1530 Figure of 8 suture removed, quik clot and tegederm applied. Pressure held for 5 minutes. Groin site remains soft. Complaining of sl nausea. 1543  Repositioned in bed. Zofran 4 mg IVP given. Apple juice and saltines given. Family at bedside, call light in reach. 1555 Discharge instructions reviewed with patient and family member. Patient and family verbalized understanding. Follow up appointment(s) reviewed with patient and family. Patient given discharge instructions, and appointment times. Still feeling queasy. Wants to wait a few more minutes until she walks. 1630 Sat on side of bed, still feeling nauseated. Vomited approx 75 ml white frothy material with some yellow fluid noted. Right groin bleeding. Returned to bed. Pressure held for 5 minutes. 1645  Groin injected with epi with lido, pressure held another 5 minutes quik clot and tegederm applied. Groin site soft. Walked to [facility] with assist of 2. Patient states feeling better since she vomited. Groin site unchanged after walking. Starry soda given. 1700 Dr. Tawnya Mcgraw notified of nausea. OK to call in Zofran ODT 4mg every four hours #5 to [facility] on [address]. Assisted with dressing. No further vomiting. Taken to discharge bridge via wheelchair. Patient discharged to home with family via car.

## 2023-10-17 NOTE — PROCEDURES
procedure. Under intracardiac ultrasound guidance, we evaluated for pericardial effusion, and there was no evidence of such. Specimen collected: none    Estimated blood loss: < 50 cc    The patient tolerated the procedure well and there were no complications. Patient was extubated and transferred to the recovery area in stable condition. Conclusion:     - Pre- and post-procedure diagnoses were paroxysmal atrial fibrillation, roof-dependent left atrial flutter with  ms with straight up-down activation, anterior wall dependent left atrial flutter with  ms with proximal-distal activation, and cavotricuspid isthmus dependent right atrial flutter with  ms with proximal-distal activation  - Pulmonary vein isolations using wide area circumferential radiofrequency ablation   - Additional ablation with creation of roof line (for roof-dependent left atrial flutter), anterior line from the mitral annulus to the roof (for anterior wall dependent left atrial flutter), and cavotricuspid isthmus (for right atrial flutter)  - Ablation of complex fractionated atrial electrogram in the left atrial septal wall (for atrial fibrillation)  - Ablation of complex fractionated atrial electrogram in the left atrial anterior but inferior wall (for atrial fibrillation)    Plan:   The patient will be monitored and receive the usual post ablation care. If there are no complications, the patient will be discharged from the hospital either later today or tomorrow with pre-admission Flecainide 25mg po bid, pre-admission Toprol XL 12.5mg daily, and pre-admission Eliquis 5mg po bid. The patient will follow-up with the EP Nurse Practitioner in 3 month's time. Thank you for allowing us to participate in the care of your patient. If you have any questions or concerns, please do not hesitate to contact me.     Alexandria Olsen MD, MS, [facility], [address] [facility] Electrophysiology  [facility]

## 2023-10-17 NOTE — PROGRESS NOTES
Patient received from cath lab. PT placed on monitor, pt presents with normal sinus rhythm. Pt on 2 L of oxygen via NC. Pt dori score is 8. Right groin puncture site with figure 8 closure stitch assessed at the time of handoff with no bleeding or hematoma noted and palpable distal pulses. Handoff preformed with Thad Westbrook RN and Aaron SIMMONS.

## 2023-10-17 NOTE — DISCHARGE INSTRUCTIONS
CARDIAC ABLATION    Care of your puncture site:  Remove bandage 24 hours after the procedure. May shower in 24 hours but do not sit in a bathtub/pool of water for 5 days or until the wound is healed. Gently clean groin using soap and water. Dry thoroughly and apply a Band-Aid that covers the entire site. Use Band-Aid until skin heals over in about 3-5 days. Do not apply powder or lotion. Limit walking and stair climbing today. Normal Observations:  Soreness or tenderness which may last one week. Mild oozing from the incision site. Possible bruising that could last 2 weeks. (Atrial Fib Ablations Only)  You may experience chest burning that will peak in 2 days of the procedure. The burning will begin to subside the following days. You may take Tylenol for the discomfort    Activity:  You may resume driving 24 hours following the procedure. Do not make important / legal decisions within 24 hours after procedure. Do not drink alcoholic beverages or take any sleeping pills for 24 hours. You may resume normal activity in 3 days or after the wound heals. Avoid lifting more than 10 pounds for 3 days or until the wound heals. Avoid strenuous exercise or activity for 1 week. Nutrition:  Regular diet. Call your doctor immediately if your condition worsens, for any other concerns, for a follow-up appointment or if you experience any of the following:  Increased swelling on the groin or leg. Unusual pain, numbness, or tingling of the groin or down the leg. Any signs of infection such as: redness, yellow drainage at the site, swelling or pain.     IF GROIN STARTS BLEEDING SIGNIFICANTLY:   LAY FLAT, HOLD FIRM DIRECT PRESSURE, AND CALL 911

## 2023-10-17 NOTE — ANESTHESIA POSTPROCEDURE EVALUATION
Department of Anesthesiology  Postprocedure Note    Patient: Amarilis Gonzalez  MRN: [id number]  YOB: 1945  Date of evaluation: 10/17/2023      Procedure Summary     Date: 10/17/23 Room / Location: [facility] Cath Lab    Anesthesia Start: 1137 Anesthesia Stop: 2298    Procedure: DIAGNOSTIC CARDIAC CATH LAB PROCEDURE Diagnosis:       PAF (paroxysmal atrial fibrillation) (HCC)      Typical atrial flutter (HCC)      Current use of anticoagulant therapy      Diastolic dysfunction      Left atrial dilatation    Scheduled Providers:  Responsible Provider: Jany Syed MD    Anesthesia Type: general ASA Status: 3          Anesthesia Type: No value filed.     Aleisha Phase I: Aleisha Score: 10    Aleisha Phase II:        Anesthesia Post Evaluation    Patient location during evaluation: PACU  Patient participation: complete - patient participated  Level of consciousness: awake and alert  Pain score: 1  Airway patency: patent  Nausea & Vomiting: no nausea and no vomiting  Complications: no  Cardiovascular status: blood pressure returned to baseline  Respiratory status: acceptable  Hydration status: euvolemic  Pain management: adequate

## 2023-10-17 NOTE — PROGRESS NOTES
PRE-PROCEDURE    DATE: 10/17/2023 ARRIVAL TO CATH LAB: 8:56 AM    ADMIT SOURCE: Outpatient    ID & ALLERGY BAND: On    CONSENT: Yes    NPO SINCE: Midnight    LABS: Yes, Negative  PREGNANCY TEST: N/A    LABS:  BUN/CREAT:18/0.9  H/H:14/43  PLT:173      IV SITE : Started in Left AC.  with fluids infusing at 75 8:56 AM     SURGERIES PLANNED: No    BLEEDING PROBLEMS: No    BLEEDING RISK: Low Risk <2%    COMPLIANCE: Yes    LAST DOSE (if applicable):  ASA:   [id number] (Plavix, Effient, Brilinta):    Anticoagulants (Coumadin, Xarelto, Eliquis): 10/16/23 2000  Other Blood Thinners:       OTHER MEDICATIONS TAKEN AT HOME:Amlodipine, Losartan        PATIENT HISTORY    CHIEF COMPLAINT: Arrythmia    EKG:  No    Pre CATH Rhythm: Normal Sinus Rhythm      CARDIAC CTA PREFORMED:  Yes     Most recent date:  10/10/23     Results:    Unknown    HYPERTENSION: Yes    DYSLIPIDEMIA: Yes      CEREBRALVASCULAR DX: No    PERIPHERAL ARTERIAL DISEASE: No    CHRONIC LUNG DISEASE: No    TOBACCO: Never    DIABETIC: No    CARDIAC ARREST: No    DIALYSIS: No    FRAILTY SCORE: 3 MANAGING WELL (medical problems are well controlled, not regularly active beyond routine walking)

## 2023-10-17 NOTE — H&P
to the proposed care, treatment, and services. The discussion I have had with the patient encompassed risks, benefits, and side effects related to the alternatives and the risks related to not receiving the proposed care, treatment and services.       Helena Fregoso MD, MS, [facility]  Cardiac Electrophysiology  [place]  [address] [place], [address] [facility]  [phone]

## 2023-11-27 ENCOUNTER — OFFICE VISIT (OUTPATIENT)
Dept: FAMILY MEDICINE CLINIC | Age: 78
End: 2023-11-27
Payer: MEDICARE

## 2023-11-27 VITALS
TEMPERATURE: 98.2 F | HEART RATE: 66 BPM | HEIGHT: 62 IN | OXYGEN SATURATION: 99 % | SYSTOLIC BLOOD PRESSURE: 200 MMHG | BODY MASS INDEX: 28.23 KG/M2 | RESPIRATION RATE: 16 BRPM | DIASTOLIC BLOOD PRESSURE: 100 MMHG | WEIGHT: 153.4 LBS

## 2023-11-27 DIAGNOSIS — M25.511 ACUTE PAIN OF RIGHT SHOULDER: ICD-10-CM

## 2023-11-27 DIAGNOSIS — E78.5 TYPE 2 DIABETES MELLITUS WITH HYPERLIPIDEMIA (HCC): Primary | ICD-10-CM

## 2023-11-27 DIAGNOSIS — E11.69 TYPE 2 DIABETES MELLITUS WITH HYPERLIPIDEMIA (HCC): Primary | ICD-10-CM

## 2023-11-27 DIAGNOSIS — I10 ESSENTIAL HYPERTENSION: ICD-10-CM

## 2023-11-27 DIAGNOSIS — I48.0 PAF (PAROXYSMAL ATRIAL FIBRILLATION) (HCC): ICD-10-CM

## 2023-11-27 DIAGNOSIS — E78.2 MIXED HYPERLIPIDEMIA: ICD-10-CM

## 2023-11-27 LAB — HBA1C MFR BLD: 6.5 %

## 2023-11-27 PROCEDURE — 1123F ACP DISCUSS/DSCN MKR DOCD: CPT | Performed by: NURSE PRACTITIONER

## 2023-11-27 PROCEDURE — 99214 OFFICE O/P EST MOD 30 MIN: CPT | Performed by: NURSE PRACTITIONER

## 2023-11-27 PROCEDURE — 3077F SYST BP >= 140 MM HG: CPT | Performed by: NURSE PRACTITIONER

## 2023-11-27 PROCEDURE — 83036 HEMOGLOBIN GLYCOSYLATED A1C: CPT | Performed by: NURSE PRACTITIONER

## 2023-11-27 PROCEDURE — 3079F DIAST BP 80-89 MM HG: CPT | Performed by: NURSE PRACTITIONER

## 2023-11-27 PROCEDURE — 3044F HG A1C LEVEL LT 7.0%: CPT | Performed by: NURSE PRACTITIONER

## 2023-11-27 RX ORDER — AMLODIPINE BESYLATE 10 MG/1
10 TABLET ORAL EVERY EVENING
Qty: 30 TABLET | Refills: 0 | Status: SHIPPED | OUTPATIENT
Start: 2023-11-27

## 2023-11-27 ASSESSMENT — ENCOUNTER SYMPTOMS
COUGH: 0
DIARRHEA: 0
CHEST TIGHTNESS: 0
SHORTNESS OF BREATH: 0
CONSTIPATION: 0
VOMITING: 0
NAUSEA: 0

## 2023-11-27 NOTE — PROGRESS NOTES
distress. Appearance: She is well-developed. HENT:      Head: Normocephalic and atraumatic. Cardiovascular:      Rate and Rhythm: Normal rate and regular rhythm. Heart sounds: Normal heart sounds. No murmur heard. No friction rub. No gallop. Pulmonary:      Effort: Pulmonary effort is normal. No respiratory distress. Breath sounds: Normal breath sounds. Musculoskeletal:      Right shoulder: Tenderness present. No swelling or bony tenderness. Decreased range of motion. Cervical back: Neck supple. Right lower leg: No edema. Left lower leg: No edema. Skin:     General: Skin is warm and dry. Neurological:      Mental Status: She is alert and oriented to person, place, and time. Psychiatric:         Behavior: Behavior normal.         Thought Content: Thought content normal.         Judgment: Judgment normal.         Assessmentand Plan  Aileen Tyler was seen today    Diagnoses and all orders for this visit:    Type 2 diabetes mellitus with hyperlipidemia ([address])  -     POCT glycosylated hemoglobin (Hb A1C)- 6.5%  Diabetes is controlled with diet  Declines cholesterol medication     Essential hypertension: uncontrolled. BP significantly elevated. She is not symptomatic. She would not go to the ER for her BP. She did agree to increase her amlodipine to 10 mg daily. Continue to monitor BP at home. Her reported home BP readings are normal.   -     amLODIPine (NORVASC) 10 MG tablet; Take 1 tablet by mouth every evening  Continue metoprolol succinate 12.5 mg daily and losartan 25 mg daily  Advised to go to ER for evaluation if she would develop a headache or dizziness. She declined blood work today. Mixed hyperlipidemia  Patient has declined medications. Discussed increased cardiovascular risk, but still declined medication. PAF (paroxysmal atrial fibrillation) ([address])  Continue f/u with cardiologist Dr. Brain Yap.  Medications per cardiologist.     Acute pain of right shoulder  -

## 2023-12-01 ENCOUNTER — HOSPITAL ENCOUNTER (EMERGENCY)
Age: 78
Discharge: HOME | End: 2023-12-01
Attending: EMERGENCY MEDICINE
Payer: MEDICARE

## 2023-12-01 ENCOUNTER — TELEPHONE (OUTPATIENT)
Dept: FAMILY MEDICINE CLINIC | Age: 78
End: 2023-12-01

## 2023-12-01 VITALS
BODY MASS INDEX: 29.58 KG/M2 | TEMPERATURE: 98.2 F | DIASTOLIC BLOOD PRESSURE: 89 MMHG | RESPIRATION RATE: 14 BRPM | HEIGHT: 62 IN | SYSTOLIC BLOOD PRESSURE: 156 MMHG | WEIGHT: 160.72 LBS | OXYGEN SATURATION: 99 % | HEART RATE: 102 BPM

## 2023-12-01 DIAGNOSIS — I10 HYPERTENSION, UNSPECIFIED TYPE: Primary | ICD-10-CM

## 2023-12-01 PROCEDURE — 99283 EMERGENCY DEPT VISIT LOW MDM: CPT

## 2023-12-01 ASSESSMENT — ENCOUNTER SYMPTOMS
VOMITING: 0
EYE ITCHING: 0
COLOR CHANGE: 0
CONSTIPATION: 0
EYE DISCHARGE: 0
COUGH: 0
SHORTNESS OF BREATH: 0
ABDOMINAL PAIN: 0

## 2023-12-01 ASSESSMENT — PAIN - FUNCTIONAL ASSESSMENT: PAIN_FUNCTIONAL_ASSESSMENT: NONE - DENIES PAIN

## 2023-12-01 NOTE — TELEPHONE ENCOUNTER
Pt was increased to ten mg of amlodipine, she started that x4 days ago and she noticed palpitations.  She was not having that at the 5 mg.  What do you advise.  Pt went to the ER this morning bc her bp has gone up, they didn't do anything because it had gone down and they didn't want it to go to low.  She takes it at night with her flecinide and blood thinners.

## 2023-12-01 NOTE — TELEPHONE ENCOUNTER
Gabby austin upped her amlopidine to 10mg when she was on 5mg  And she is taking it at night  She is having heart palpitations  Triage with zheng

## 2023-12-01 NOTE — TELEPHONE ENCOUNTER
Called pt and left a message with this information per her instructions.  I asked that she call us back.

## 2023-12-01 NOTE — ED NOTES
Discharge and education instructions reviewed. Patient verbalized understanding, teach-back successful. Patient denied questions at this time. No acute distress noted. Patient instructed to follow-up as noted - return to emergency department if symptoms worsen. Patient verbalized understanding. Discharged per EDMD with discharge instructions.         Sunday Stapleton RN  12/01/23 [id number]

## 2023-12-05 ENCOUNTER — TELEPHONE (OUTPATIENT)
Dept: CARDIOLOGY CLINIC | Age: 78
End: 2023-12-05

## 2023-12-05 NOTE — TELEPHONE ENCOUNTER
Re Morrison called the office wanting to know if she can take Amlodipine and Eliquis together at night? She shared that her current PCP increased her Amlodipine to 10 MG and she has noticed heart palpitations. While on the 5 MG however, she had no issues. Please advise.     [id number] [facility] callback: [phone]

## 2023-12-05 NOTE — TELEPHONE ENCOUNTER
Called spoke with patient gave message it is ok for patient to take eliquis and amlodipine together at night. Patient states understanding.

## 2023-12-05 NOTE — TELEPHONE ENCOUNTER
Please reach out to patient and let her know it is ok for her to take eliquis and amlodipine together at night.

## 2024-01-10 NOTE — TELEPHONE ENCOUNTER
Medication Refill    Medication needing refilled: apixaban (ELIQUIS)    - Dr. Graham Boyd filled this medication last time but should  have been  plus the doctor has since retired     Dosage of the medication:  5 MG TABS tablet     How are you taking this medication (QD, BID, TID, QID, PRN):   Take 1 tablet by mouth 2 times daily     30 or 90 day supply called in:  30 days     When will you run out of your medication:  has a few more days left    Which Pharmacy are we sending the medication to?:[facility] [place], [place] - [address] [facility] - P [phone] - F [phone]  [address] [facility] [address]  Phone: [phone]  Fax: [phone]     Next appt with  is 1/23/2024

## 2024-01-10 NOTE — TELEPHONE ENCOUNTER
Last OV:8/31/2023  Last Labs:10/10/2023  Last Refills:04/21/23  Next Appt:1/23/2024    Last EKG:  10/17/2023

## 2024-01-18 ENCOUNTER — OFFICE VISIT (OUTPATIENT)
Dept: INTERNAL MEDICINE CLINIC | Age: 79
End: 2024-01-18

## 2024-01-18 VITALS
WEIGHT: 152 LBS | SYSTOLIC BLOOD PRESSURE: 168 MMHG | BODY MASS INDEX: 27.97 KG/M2 | HEART RATE: 67 BPM | DIASTOLIC BLOOD PRESSURE: 92 MMHG | OXYGEN SATURATION: 98 % | HEIGHT: 62 IN | TEMPERATURE: 97.9 F

## 2024-01-18 DIAGNOSIS — E78.2 MIXED HYPERLIPIDEMIA: ICD-10-CM

## 2024-01-18 DIAGNOSIS — I48.91 ATRIAL FIBRILLATION WITH RVR (HCC): ICD-10-CM

## 2024-01-18 DIAGNOSIS — E11.65 TYPE 2 DIABETES MELLITUS WITH HYPERGLYCEMIA, WITHOUT LONG-TERM CURRENT USE OF INSULIN (HCC): Primary | ICD-10-CM

## 2024-01-18 DIAGNOSIS — I48.91 NEW ONSET ATRIAL FIBRILLATION (HCC): ICD-10-CM

## 2024-01-18 DIAGNOSIS — I10 ESSENTIAL HYPERTENSION: ICD-10-CM

## 2024-01-18 PROBLEM — E11.9 TYPE 2 DIABETES MELLITUS (HCC): Status: RESOLVED | Noted: 2022-10-04 | Resolved: 2024-01-18

## 2024-01-18 RX ORDER — AMLODIPINE BESYLATE 5 MG/1
5 TABLET ORAL NIGHTLY
Qty: 90 TABLET | Refills: 1 | Status: SHIPPED | OUTPATIENT
Start: 2024-01-18 | End: 2024-04-17

## 2024-01-18 ASSESSMENT — PATIENT HEALTH QUESTIONNAIRE - PHQ9
2. FEELING DOWN, DEPRESSED OR HOPELESS: 0
1. LITTLE INTEREST OR PLEASURE IN DOING THINGS: 0
SUM OF ALL RESPONSES TO PHQ QUESTIONS 1-9: 0
SUM OF ALL RESPONSES TO PHQ9 QUESTIONS 1 & 2: 0
SUM OF ALL RESPONSES TO PHQ QUESTIONS 1-9: 0

## 2024-01-18 NOTE — PROGRESS NOTES
tablet 1    losartan (COZAAR) 25 MG tablet Take 1 tablet by mouth daily DELVIN 90 tablet 1    Cholecalciferol (VITAMIN D) 50 MCG (2000 UT) CAPS capsule Take by mouth       No current facility-administered medications for this visit.       Objective       Lab Results   Component Value Date    WBC 4.3 10/10/2023    HGB 14.2 10/10/2023    HCT 43.0 10/10/2023    MCV 90.1 10/10/2023     10/10/2023      Lab Results   Component Value Date     10/10/2023    K 3.9 10/10/2023     10/10/2023    CO2 24 10/10/2023    BUN 14 10/10/2023    CREATININE 0.8 10/10/2023    GLUCOSE 111 (H) 10/10/2023    CALCIUM 9.3 10/10/2023    PROT 7.3 08/17/2023    LABALBU 4.6 08/17/2023    BILITOT 1.0 08/17/2023    ALKPHOS 93 08/17/2023    AST 20 08/17/2023    ALT 18 08/17/2023    LABGLOM >60 10/10/2023    GFRAA >60 10/04/2022    AGRATIO 1.7 08/17/2023    GLOB 2.9 10/04/2021     Lab Results   Component Value Date    CHOL 243 (H) 04/11/2023    CHOL 233 (H) 04/05/2022    CHOL 232 (H) 10/04/2021     Lab Results   Component Value Date    TRIG 73 04/11/2023    TRIG 71 04/05/2022    TRIG 57 10/04/2021     Lab Results   Component Value Date    HDL 76 (H) 04/11/2023    HDL 68 (H) 04/05/2022    HDL 68 (H) 10/04/2021     Lab Results   Component Value Date    LDLCALC 152 (H) 04/11/2023    LDLCALC 151 (H) 04/05/2022    LDLCALC 153 (H) 10/04/2021     Lab Results   Component Value Date    LABVLDL 15 04/11/2023    LABVLDL 14 04/05/2022    LABVLDL 11 10/04/2021     No results found for: \"CHOLHDLRATIO\"  Lab Results   Component Value Date    LABA1C 6.5 11/27/2023     Lab Results   Component Value Date    .7 04/11/2023         Physical Exam:  Vital Signs: BP (!) 168/92   Pulse 67   Temp 97.9 °F (36.6 °C)   Ht 1.575 m (5' 2\")   Wt 68.9 kg (152 lb)   SpO2 98%   BMI 27.80 kg/m²   Constitutional:  Well developed, well nourished, no acute distress, non-toxic appearance   Eyes:  PERRL, conjunctiva normal   HENT:  Atraumatic, external ears

## 2024-01-18 NOTE — ASSESSMENT & PLAN NOTE
Has significant component of whitecoat syndrome.    Current medications are losartan 25 mg, metoprolol XL 25 mg, amlodipine 5 mg.   BP at home at goal.  Will not adjusting medications today to avoid risk of hypotension  Will have patient continue monitor blood pressure at home.

## 2024-01-23 ENCOUNTER — OFFICE VISIT (OUTPATIENT)
Dept: CARDIOLOGY CLINIC | Age: 79
End: 2024-01-23
Payer: MEDICARE

## 2024-01-23 ENCOUNTER — TELEPHONE (OUTPATIENT)
Dept: CARDIOLOGY CLINIC | Age: 79
End: 2024-01-23

## 2024-01-23 VITALS
WEIGHT: 153 LBS | HEART RATE: 55 BPM | OXYGEN SATURATION: 98 % | SYSTOLIC BLOOD PRESSURE: 148 MMHG | HEIGHT: 62 IN | BODY MASS INDEX: 28.16 KG/M2 | DIASTOLIC BLOOD PRESSURE: 80 MMHG

## 2024-01-23 DIAGNOSIS — I48.0 PAF (PAROXYSMAL ATRIAL FIBRILLATION) (HCC): Primary | ICD-10-CM

## 2024-01-23 DIAGNOSIS — I48.3 TYPICAL ATRIAL FLUTTER (HCC): ICD-10-CM

## 2024-01-23 DIAGNOSIS — I48.4 ATYPICAL ATRIAL FLUTTER (HCC): ICD-10-CM

## 2024-01-23 DIAGNOSIS — I10 ESSENTIAL HYPERTENSION: ICD-10-CM

## 2024-01-23 DIAGNOSIS — Z79.01 CURRENT USE OF ANTICOAGULANT THERAPY: ICD-10-CM

## 2024-01-23 PROCEDURE — 3077F SYST BP >= 140 MM HG: CPT | Performed by: INTERNAL MEDICINE

## 2024-01-23 PROCEDURE — 1123F ACP DISCUSS/DSCN MKR DOCD: CPT | Performed by: INTERNAL MEDICINE

## 2024-01-23 PROCEDURE — 99214 OFFICE O/P EST MOD 30 MIN: CPT | Performed by: INTERNAL MEDICINE

## 2024-01-23 PROCEDURE — 3079F DIAST BP 80-89 MM HG: CPT | Performed by: INTERNAL MEDICINE

## 2024-01-23 PROCEDURE — 93000 ELECTROCARDIOGRAM COMPLETE: CPT | Performed by: INTERNAL MEDICINE

## 2024-01-23 NOTE — PROGRESS NOTES
Cardiac Electrophysiology Consultation   Date: 1/23/2024  Reason for Consultation: Atrial fibrillation / Typical atrial flutter  Consult Requesting Physician: Lou Underwood MD  Primary Care Physician: Lou Underwood MD    Chief Complaint:   Chief Complaint   Patient presents with    Follow-up     post ablation        HPI: Rosanna Stanford is a 78 y.o. patient with a history of essential hypertension, hyperlipidemia and atrial fibrillation.     She presented to [facility] ED on 8/21/2022 reporting symptoms of epigastric pain and a lot of gas causing belching and flatulence. She stated that when she arrived to the ED, she started to have palpitations that were fast and irregular, denies ever having these in the past.     She was noted to be in atrial fibrillation with v-rates in the 130-150s, she was started on a diltiazem drip with improvement in her v-rates, now in the 90s-100s. She still notes palpitations but says the epigastric discomfort has resolved. Denies any increased dyspnea, fatigue, edema or syncope.    On 8/23/2022 she underwent attempt at KAYLEN CV but  but we were not able to pass the KAYLEN probe down into the esophagus because the patient's oropharynx and pharynx were tense from patient's resistance, even with Versed, Fentanyl, and Methohexital.     She was started on Eliquis 5 mg BID for [id number] Score 5 for thromboembolic risk reduction. She was started on Flecainide and Toprol XL for rate and rhythm control.  Ablation was discussed as a treatment option but she did not chose to proceed.    She was seen in office on 08/18/2023 by her PCP Lou Underwood MD  and EKG showed atrial fibrillation and atrial flutter. She reports that flecainide was causing her to feel sick and nauseous so he decreased to 25 mg BID.    She was seen in office on 08/31/2023 accompanied by her sons and her daughter on the phone hospital follow for management of atrial fibrillation. EKG showed SR. Ablation was discussed as 
See below  
was fist noted on EKG 08/17/2023.  - Converted spontaneously to SR on 8/24/2022.    - She has a CHADS2-VASc 4 (age, gender, HTN)   - Continue Eliquis 5mg BID for thromboembolic risk reduction.  -Tolerating will no signs or symptom of abnormal bruising or bleeding    - Continue Flecainide 25mg BID & Toprol 12.5mg BID.   - msec.   -TSH 2.65 (8/21/2022)   -LVEF 55 - 60 %.   - LA volume/Index: 86 ml /49 ml/m2  - s/p radiofrequency ablation of atrial fibrillation, left atrial flutter and pulmonary vein isolation 10/17/2023.  - s/p radiofrequency ablation of cavotricuspid isthmus dependent right atrial flutter with  ms with proximal-distal activation 10/174/2023.     - EKG today shows ***.   . shpost      Essential hypertension  - Goal BP <130/80  - Continue current medical management.     Follow up:  We will call with results of the monitor and if any significant arrhythmias are seen will have patient come back into clinic to discuss.        Thank you for allowing me to participate in the care of Rosanna Stanford. All questions and concerns were addressed to the patient/family. Alternatives to my treatment were discussed.     This note was scribed in the presence of Dr. Alden Zapata MD by Paige Lin RN.      ***    Alden Zapata MD, MS, FACC, FHRS  Cardiac Electrophysiology  [facility]  [address] [facility], [address]  [place] [id number] [phone]

## 2024-01-23 NOTE — TELEPHONE ENCOUNTER
Monitor placed by  Aris  Monitor company  [employer]  Length of monitor 30 day  Monitor ordered by Dr. Zapata  Serial number [id number]  Kit ID [id number]  Activation successful prior to pt leaving office? Yes

## 2024-01-23 NOTE — PATIENT INSTRUCTIONS
We will call with results of the monitor and if any significant arrhythmias are seen will have patient come back into clinic to discuss.

## 2024-02-07 ENCOUNTER — TELEPHONE (OUTPATIENT)
Dept: CARDIOLOGY CLINIC | Age: 79
End: 2024-02-07

## 2024-02-07 NOTE — TELEPHONE ENCOUNTER
Rosanna, called in today she had a monitor placed and she is sneezing and itching and showing signs of bruising  she was told by aleks to take it off because it was not connecting and would like to know what to do.     [phone]

## 2024-02-07 NOTE — TELEPHONE ENCOUNTER
Called pt in regards to message below and she states she has been having problems with her monitor the past couple days. Pt states she has a really bad rash on the area of the monitor. Pt states she was advised by the company to turn monitor off since there was no information being transmitted. I also advised pt to keep monitor off for the moment while we wait for further recommendations. I also informed pt about box number 3 (sensitive skin patch) and she states she may be interested only if she does not have to wear monitor for another 30 days. Looks like monitor was provided 01/23/2024 during ov to assess for afib burden. Looks like we do have some readings for monitor 01/23/2024-02/4/2024. Not sure if that information will be enough. Please advise if we should have pt come in and finish monitor by using box 3 or if we should try a different monitor.

## 2024-02-08 NOTE — TELEPHONE ENCOUNTER
Called patient and relayed message from Paige IVORY.  Also advised patient to call [employer] to help her with the transition if she has any questions regarding the application.  Patient verbalized and confirmed understanding.

## 2024-02-08 NOTE — TELEPHONE ENCOUNTER
Would like to get at least another two weeks if able, using skin sensitive patches.  Will the monitoring company be sending new monitor to patient?

## 2024-02-27 ENCOUNTER — TELEPHONE (OUTPATIENT)
Dept: CARDIOLOGY CLINIC | Age: 79
End: 2024-02-27

## 2024-02-27 PROCEDURE — 93228 REMOTE 30 DAY ECG REV/REPORT: CPT | Performed by: INTERNAL MEDICINE

## 2024-02-27 NOTE — TELEPHONE ENCOUNTER
Called patient advised of monitor results. Verbalized understanding.    Monitor was worn from 01/23/2024-02/23/2024 and showed normal sinus rhythm.    Discussed s/s to monitor for (fatigue, dyspnea) and the need for a 12 lead EKG should these symptoms develop, to evaluate whether there is recurrence of atrial fibrillation or new left atrial flutter.

## 2024-02-28 DIAGNOSIS — I48.0 PAF (PAROXYSMAL ATRIAL FIBRILLATION) (HCC): ICD-10-CM

## 2024-02-28 DIAGNOSIS — I48.0 PAROXYSMAL ATRIAL FIBRILLATION (HCC): Primary | ICD-10-CM

## 2024-04-01 DIAGNOSIS — I10 ESSENTIAL HYPERTENSION: ICD-10-CM

## 2024-04-01 RX ORDER — LOSARTAN POTASSIUM 25 MG/1
25 TABLET ORAL DAILY
Qty: 90 TABLET | Refills: 1 | Status: SHIPPED | OUTPATIENT
Start: 2024-04-01

## 2024-04-01 NOTE — TELEPHONE ENCOUNTER
Patient requesting the following medication refill     losartan (COZAAR) 25 MG tablet       [facility] [id number] [place] [id number] - P [phone] - F [phone]

## 2024-04-15 DIAGNOSIS — E11.65 TYPE 2 DIABETES MELLITUS WITH HYPERGLYCEMIA, WITHOUT LONG-TERM CURRENT USE OF INSULIN (HCC): ICD-10-CM

## 2024-04-15 DIAGNOSIS — E78.2 MIXED HYPERLIPIDEMIA: ICD-10-CM

## 2024-04-15 DIAGNOSIS — I10 ESSENTIAL HYPERTENSION: ICD-10-CM

## 2024-04-15 LAB
ALBUMIN SERPL-MCNC: 4.5 G/DL (ref 3.4–5)
ALBUMIN/GLOB SERPL: 1.7 {RATIO} (ref 1.1–2.2)
ALP SERPL-CCNC: 117 U/L (ref 40–129)
ALT SERPL-CCNC: 18 U/L (ref 10–40)
ANION GAP SERPL CALCULATED.3IONS-SCNC: 11 MMOL/L (ref 3–16)
AST SERPL-CCNC: 24 U/L (ref 15–37)
BASOPHILS # BLD: 0 K/UL (ref 0–0.2)
BASOPHILS NFR BLD: 0.8 %
BILIRUB SERPL-MCNC: 0.7 MG/DL (ref 0–1)
BUN SERPL-MCNC: 12 MG/DL (ref 7–20)
CALCIUM SERPL-MCNC: 10.1 MG/DL (ref 8.3–10.6)
CHLORIDE SERPL-SCNC: 104 MMOL/L (ref 99–110)
CHOLEST SERPL-MCNC: 250 MG/DL (ref 0–199)
CO2 SERPL-SCNC: 26 MMOL/L (ref 21–32)
CREAT SERPL-MCNC: 0.8 MG/DL (ref 0.6–1.2)
DEPRECATED RDW RBC AUTO: 15.3 % (ref 12.4–15.4)
EOSINOPHIL # BLD: 0.1 K/UL (ref 0–0.6)
EOSINOPHIL NFR BLD: 2.6 %
GFR SERPLBLD CREATININE-BSD FMLA CKD-EPI: 76 ML/MIN/{1.73_M2}
GLUCOSE SERPL-MCNC: 112 MG/DL (ref 70–99)
HCT VFR BLD AUTO: 38.3 % (ref 36–48)
HDLC SERPL-MCNC: 69 MG/DL (ref 40–60)
HGB BLD-MCNC: 13 G/DL (ref 12–16)
LDL CHOLESTEROL CALCULATED: 168 MG/DL
LYMPHOCYTES # BLD: 2.2 K/UL (ref 1–5.1)
LYMPHOCYTES NFR BLD: 50.7 %
MCH RBC QN AUTO: 29.8 PG (ref 26–34)
MCHC RBC AUTO-ENTMCNC: 33.8 G/DL (ref 31–36)
MCV RBC AUTO: 88.1 FL (ref 80–100)
MONOCYTES # BLD: 0.3 K/UL (ref 0–1.3)
MONOCYTES NFR BLD: 7.7 %
NEUTROPHILS # BLD: 1.7 K/UL (ref 1.7–7.7)
NEUTROPHILS NFR BLD: 38.2 %
PLATELET # BLD AUTO: 185 K/UL (ref 135–450)
PMV BLD AUTO: 9.5 FL (ref 5–10.5)
POTASSIUM SERPL-SCNC: 4.7 MMOL/L (ref 3.5–5.1)
PROT SERPL-MCNC: 7.2 G/DL (ref 6.4–8.2)
RBC # BLD AUTO: 4.35 M/UL (ref 4–5.2)
SODIUM SERPL-SCNC: 141 MMOL/L (ref 136–145)
TRIGL SERPL-MCNC: 66 MG/DL (ref 0–150)
TSH SERPL DL<=0.005 MIU/L-ACNC: 0.97 UIU/ML (ref 0.27–4.2)
VLDLC SERPL CALC-MCNC: 13 MG/DL
WBC # BLD AUTO: 4.4 K/UL (ref 4–11)

## 2024-04-16 LAB
EST. AVERAGE GLUCOSE BLD GHB EST-MCNC: 139.9 MG/DL
HBA1C MFR BLD: 6.5 %

## 2024-04-18 ENCOUNTER — OFFICE VISIT (OUTPATIENT)
Dept: INTERNAL MEDICINE CLINIC | Age: 79
End: 2024-04-18

## 2024-04-18 VITALS
BODY MASS INDEX: 27.79 KG/M2 | DIASTOLIC BLOOD PRESSURE: 82 MMHG | HEART RATE: 77 BPM | OXYGEN SATURATION: 98 % | SYSTOLIC BLOOD PRESSURE: 138 MMHG | HEIGHT: 62 IN | TEMPERATURE: 98 F | WEIGHT: 151 LBS

## 2024-04-18 DIAGNOSIS — E11.65 TYPE 2 DIABETES MELLITUS WITH HYPERGLYCEMIA, WITHOUT LONG-TERM CURRENT USE OF INSULIN (HCC): ICD-10-CM

## 2024-04-18 DIAGNOSIS — I48.0 PAROXYSMAL ATRIAL FIBRILLATION (HCC): ICD-10-CM

## 2024-04-18 DIAGNOSIS — Z28.82 VACCINE REFUSED BY PARENT: ICD-10-CM

## 2024-04-18 DIAGNOSIS — Z00.00 MEDICARE ANNUAL WELLNESS VISIT, SUBSEQUENT: Primary | ICD-10-CM

## 2024-04-18 DIAGNOSIS — E78.2 MIXED HYPERLIPIDEMIA: ICD-10-CM

## 2024-04-18 PROBLEM — I48.91 ATRIAL FIBRILLATION WITH RVR (HCC): Status: RESOLVED | Noted: 2022-08-22 | Resolved: 2024-04-18

## 2024-04-18 SDOH — ECONOMIC STABILITY: FOOD INSECURITY: WITHIN THE PAST 12 MONTHS, THE FOOD YOU BOUGHT JUST DIDN'T LAST AND YOU DIDN'T HAVE MONEY TO GET MORE.: NEVER TRUE

## 2024-04-18 SDOH — ECONOMIC STABILITY: INCOME INSECURITY: HOW HARD IS IT FOR YOU TO PAY FOR THE VERY BASICS LIKE FOOD, HOUSING, MEDICAL CARE, AND HEATING?: NOT HARD AT ALL

## 2024-04-18 SDOH — ECONOMIC STABILITY: FOOD INSECURITY: WITHIN THE PAST 12 MONTHS, YOU WORRIED THAT YOUR FOOD WOULD RUN OUT BEFORE YOU GOT MONEY TO BUY MORE.: NEVER TRUE

## 2024-04-18 SDOH — ECONOMIC STABILITY: HOUSING INSECURITY
IN THE LAST 12 MONTHS, WAS THERE A TIME WHEN YOU DID NOT HAVE A STEADY PLACE TO SLEEP OR SLEPT IN A SHELTER (INCLUDING NOW)?: NO

## 2024-04-18 ASSESSMENT — PATIENT HEALTH QUESTIONNAIRE - PHQ9
SUM OF ALL RESPONSES TO PHQ QUESTIONS 1-9: 0
2. FEELING DOWN, DEPRESSED OR HOPELESS: NOT AT ALL
1. LITTLE INTEREST OR PLEASURE IN DOING THINGS: NOT AT ALL
SUM OF ALL RESPONSES TO PHQ QUESTIONS 1-9: 0
SUM OF ALL RESPONSES TO PHQ QUESTIONS 1-9: 0
SUM OF ALL RESPONSES TO PHQ9 QUESTIONS 1 & 2: 0
SUM OF ALL RESPONSES TO PHQ QUESTIONS 1-9: 0

## 2024-04-18 ASSESSMENT — LIFESTYLE VARIABLES: HOW OFTEN DO YOU HAVE A DRINK CONTAINING ALCOHOL: NEVER

## 2024-04-18 NOTE — PATIENT INSTRUCTIONS
You can start taking Omega 3 (fish oil) and Flaxeed for your cholesterol.      Learning About Mild Cognitive Impairment (MCI)  What is mild cognitive impairment (MCI)?     It's common to forget things sometimes as we get older. But some older people have memory loss that's more than normal aging. It's called mild cognitive impairment, or MCI. It is not the same as dementia.  People with the condition often know that their memory or mental function has changed. Tests may show some loss. But their minds work well overall. They can carry out daily tasks that are normal for them.  People with MCI have a higher chance of one day getting dementia. But not all people who have it will get dementia. Some people may stay the same over time.  What are the symptoms?  People with MCI have more memory loss than what occurs with normal aging. They may have increasing trouble with recalling words and keeping up with conversations. They may also have trouble remembering important events and making decisions.  What puts you at risk?  The risk of getting MCI increases with age. Having high blood pressure or having a family history of MCI may also increase your risk.  How is it diagnosed?  Your doctor will do a physical exam.  You may be asked questions to check your memory and other mental skills. Your doctor may also talk to close friends and family members. This can help the doctor figure out how your memory and other mental skills have changed.  You may get blood tests and tests that look at your brain.  These questions and tests can make sure you don't have other conditions that can cause symptoms like MCI. These include depression, sleep problems, and side effects from medicines.  How is it treated?  There are no medicines to treat MCI or to keep it from progressing to dementia. But treating conditions like high blood pressure and diabetes may help. A person with MCI needs routine follow-up visits with their doctor to check on

## 2024-04-18 NOTE — PROGRESS NOTES
patient instructions/AVS.  Recommended screening schedule for the next 5-10 years is provided to the patient in written form: see Patient Instructions/AVS.     Return in about 6 months (around 10/18/2024).     Subjective   The following acute and/or chronic problems were also addressed today:  See A&P   Doing well. Active at baseline.     Patient's complete Health Risk Assessment and screening values have been reviewed and are found in Flowsheets. The following problems were reviewed today and where indicated follow up appointments were made and/or referrals ordered.    Positive Risk Factor Screenings with Interventions:       Cognitive:   Clock Drawing Test (CDT): (!) Abnormal  Words recalled: 2 Words Recalled  Total Score: (!) 2  Total Score Interpretation: Abnormal Mini-Cog  Interventions:  See AVS for additional education material               Vision Screen:  Do you have difficulty driving, watching TV, or doing any of your daily activities because of your eyesight?: No  Have you had an eye exam within the past year?: (!) No  No results found.    Interventions:   Patient encouraged to make appointment with their eye specialist      Advanced Directives:  Do you have a Living Will?: (!) No    Intervention:  has NO advanced directive - information provided          Objective   Vitals:    04/18/24 0916 04/18/24 0925 04/18/24 0947   BP: (!) 150/82 (!) 150/82 138/82   Pulse: 77     Temp: 98 °F (36.7 °C)     SpO2: 98%     Weight: 68.5 kg (151 lb)     Height: 1.575 m (5' 2.01\")        Body mass index is 27.61 kg/m².      Physical Exam  Vitals and nursing note reviewed.   Constitutional:       General: She is not in acute distress.     Appearance: Normal appearance. She is well-developed. She is not diaphoretic.   HENT:      Head: Normocephalic and atraumatic.   Eyes:      General: No scleral icterus.     Conjunctiva/sclera: Conjunctivae normal.      Pupils: Pupils are equal, round, and reactive to light.

## 2024-05-18 PROBLEM — Z00.00 MEDICARE ANNUAL WELLNESS VISIT, SUBSEQUENT: Status: RESOLVED | Noted: 2024-04-18 | Resolved: 2024-05-18

## 2024-08-08 DIAGNOSIS — I10 ESSENTIAL HYPERTENSION: ICD-10-CM

## 2024-08-08 RX ORDER — AMLODIPINE BESYLATE 5 MG/1
5 TABLET ORAL NIGHTLY
Qty: 90 TABLET | Refills: 1 | Status: SHIPPED | OUTPATIENT
Start: 2024-08-08

## 2024-08-13 ENCOUNTER — HOSPITAL ENCOUNTER (OUTPATIENT)
Dept: WOMENS IMAGING | Age: 79
Discharge: HOME | End: 2024-08-13
Payer: MEDICARE

## 2024-08-13 DIAGNOSIS — Z12.31 VISIT FOR SCREENING MAMMOGRAM: ICD-10-CM

## 2024-08-13 PROCEDURE — 77067 SCR MAMMO BI INCL CAD: CPT

## 2024-09-23 DIAGNOSIS — I10 ESSENTIAL HYPERTENSION: ICD-10-CM

## 2024-09-23 RX ORDER — LOSARTAN POTASSIUM 25 MG/1
25 TABLET ORAL DAILY
Qty: 90 TABLET | Refills: 1 | Status: SHIPPED | OUTPATIENT
Start: 2024-09-23

## 2024-10-15 ENCOUNTER — TELEPHONE (OUTPATIENT)
Dept: CARDIOLOGY CLINIC | Age: 79
End: 2024-10-15

## 2024-10-15 NOTE — TELEPHONE ENCOUNTER
Rosanna called the office wanting to find out if she is to continue taking her Eliquis 5 MG. She relayed that Dr. Zapata told her to stop taking in September, no record of that being relayed to patient. She had a pending f/u based off of monitor results, work btwn 1/23-2/23 of current year, no f/u sheduled.    Rosanna did relay that she is still taking her Eliquis.     Please advise who she should follow with, appt date and time.    Rosanna's callback: [phone]

## 2024-10-16 NOTE — TELEPHONE ENCOUNTER
I placed on schedule 11/6 at 315 with Temo Son MD.   Please call patient to make sure appointment work well for her if it does not let me know and I will look for an alternative date.

## 2024-10-16 NOTE — TELEPHONE ENCOUNTER
Schedule 11/19  at 8 am or 815 spot with Dr. Ravi, if this does not work schedule next available AM opening. DO NOT SCHEDULE on THURSDAY if scheduling for Breonna. Former Alden Zapata MD patient.

## 2024-10-16 NOTE — TELEPHONE ENCOUNTER
Rosanna states she cannot do afternoon due to her son taking her, she will need a morning appt. Rosanna also wanted to know if she is to continue Eliquis until appt or not? Rosanna shares she was told by Benny to take Eliquis for a year and then stop.

## 2024-10-16 NOTE — TELEPHONE ENCOUNTER
She will need to continue Eliquis until her appointment at which time can discuss continuation vs discontinuation of the Eliquis.    I have an opening with Dr Ravi next tues 10/22 at 915 am. I placed her on the schedule.  Let me know if this appointment does not work well for her.

## 2024-10-18 ENCOUNTER — OFFICE VISIT (OUTPATIENT)
Dept: INTERNAL MEDICINE CLINIC | Age: 79
End: 2024-10-18

## 2024-10-18 VITALS
TEMPERATURE: 97.4 F | WEIGHT: 154 LBS | HEART RATE: 77 BPM | SYSTOLIC BLOOD PRESSURE: 168 MMHG | OXYGEN SATURATION: 99 % | BODY MASS INDEX: 28.16 KG/M2 | DIASTOLIC BLOOD PRESSURE: 98 MMHG

## 2024-10-18 DIAGNOSIS — I48.0 PAROXYSMAL ATRIAL FIBRILLATION (HCC): ICD-10-CM

## 2024-10-18 DIAGNOSIS — I10 ESSENTIAL HYPERTENSION: ICD-10-CM

## 2024-10-18 DIAGNOSIS — E11.65 TYPE 2 DIABETES MELLITUS WITH HYPERGLYCEMIA, WITHOUT LONG-TERM CURRENT USE OF INSULIN (HCC): Primary | ICD-10-CM

## 2024-10-18 DIAGNOSIS — E78.2 MIXED HYPERLIPIDEMIA: ICD-10-CM

## 2024-10-18 RX ORDER — TRIAMCINOLONE ACETONIDE 0.25 MG/G
OINTMENT TOPICAL
Qty: 15 G | Refills: 1 | Status: SHIPPED | OUTPATIENT
Start: 2024-10-18 | End: 2024-10-25

## 2024-10-18 NOTE — ASSESSMENT & PLAN NOTE
See Cardio, Dr. Zapata.  Recently underwent ablation in 10/2023. Off from Flecanide.   Anticoag with Eliquis. Doing well.      [id number] Score for Atrial Fibrillation Stroke Risk    Risk   Factors   Component Value   C CHF No 0   H HTN Yes 1   A2 Age >= 75 Yes,  (78 y.o.) 2   D DM Yes 1   S2 Prior Stroke/TIA No 0   V Vascular Disease No 0   A Age 65-74 No,  (78 y.o.) 0   Sc Sex female 1     [id number]  Score   5   Score last updated 4/18/24 9:55 AM EDT     Click here for a link to the UpToDate guideline \"Atrial Fibrillation: Anticoagulation therapy to prevent embolization     Disclaimer: Risk Score calculation is dependent on accuracy of patient problem list and past encounter diagnosis.

## 2024-10-18 NOTE — PROGRESS NOTES
Rosanna Stanford (:  1945) is a 79 y.o. female here for evaluation of the following chief complaint(s):  6 Month Follow-Up      Assessment & Plan   ASSESSMENT/PLAN:  1. Type 2 diabetes mellitus with hyperglycemia, without long-term current use of insulin (HCC)  Assessment & Plan:  Well controlled with diet only   Check labs at next visit   Pending eye exam next week  2. Paroxysmal atrial fibrillation (HCC)  Assessment & Plan:  See Cardio, Dr. Zapata.  Recently underwent ablation in 10/2023. Off from Flecanide.   Anticoag with Eliquis. Doing well.      [id number] Score for Atrial Fibrillation Stroke Risk    Risk   Factors   Component Value   C CHF No 0   H HTN Yes 1   A2 Age >= 75 Yes,  (78 y.o.) 2   D DM Yes 1   S2 Prior Stroke/TIA No 0   V Vascular Disease No 0   A Age 65-74 No,  (78 y.o.) 0   Sc Sex female 1     [id number]  Score   5   Score last updated 24 9:55 AM EDT     Click here for a link to the UpToDate guideline \"Atrial Fibrillation: Anticoagulation therapy to prevent embolization     Disclaimer: Risk Score calculation is dependent on accuracy of patient problem list and past encounter diagnosis.     3. Essential hypertension  Assessment & Plan:  Has significant component of whitecoat syndrome.    Current medications are losartan 25 mg, metoprolol XL 25 mg, amlodipine 5 mg.   BP at home at goal.  Will not adjusting medications today to avoid risk of hypotension  Will have patient continue monitor blood pressure at home.  4. Mixed hyperlipidemia  Assessment & Plan:  Diet controlled. declined meds.   Can take Omega 3 and Flaxeed. Information about food that can naturally lower cholesterol provided.     Declined vaccines   Return in about 6 months (around 2025) for Annual Wellness Visit.         Subjective   SUBJECTIVE/OBJECTIVE:  HPI\  Rosanna is here for a 6 month follow up visit on chronic conditions. She checked her BP at home, readings 110-120s/70, HR 50s. She is active at baseline.

## 2024-11-18 NOTE — PROGRESS NOTES
[facility]   Electrophysiology Follow up     Date: 11/18/2024  I had the privilege of visiting Rosanna Stanford in the office.       CC: Atrial fibrillation    HPI: Rosanna Stanford is a 79 y.o. female with a history of hypertension, hyperlipidemia, type 2 diabetes, paroxysmal atrial fibrillation diagnosed in August 2022, attempted KAYLEN/cardioversion with inability to pass KAYLEN probe easily, had been started on antiarrhythmic therapy, recurrent atrial fibrillation approximately 1 year later, underwent ablation on 10/17/2023, PVI, left atrial lines (Zapata), monitor in January 2024 with sinus rhythm.    Patient presents to the office today as follow-up for the evaluation and management of atrial fibrillation.  Prior to ablation she had racing heart that she has not experienced since ablation, denies palpitations, shortness of breath and fatigue.      Review of System:  [x] Full ROS obtained and negative except as mentioned in HPI      Prior to Admission medications    Medication Sig Start Date End Date Taking? Authorizing Provider   losartan (COZAAR) 25 MG tablet TAKE 1 TABLET BY MOUTH DAILY 9/23/24   Lou Underwood MD   amLODIPine (NORVASC) 5 MG tablet TAKE ONE TABLET BY MOUTH EVERY NIGHT 8/8/24   Lou Underwood MD   apixaban (ELIQUIS) 5 MG TABS tablet Take 1 tablet by mouth 2 times daily 5/24/24   Temo Son MD   Cholecalciferol (VITAMIN D) 50 MCG (2000 UT) CAPS capsule Take by mouth    Germaine Owusu MD   Naproxen Sodium 220 MG CAPS Take 1 tablet by mouth daily as needed (back or neck pain)  8/24/22  Germaine Owusu MD       Past Medical History:   Diagnosis Date    Atrial fibrillation (HCC)     Back pain 06/15/2010    Herniated disc 06/01/2000    Chiropractor Dr Bowden, lumbar , left     Hyperlipidemia     Hypertension     Palpitations 06/15/2010        Past Surgical History:   Procedure Laterality Date    COLONOSCOPY  2011    Valdez Ruiz MD       Allergies   Allergen Reactions

## 2024-11-19 ENCOUNTER — OFFICE VISIT (OUTPATIENT)
Dept: CARDIOLOGY CLINIC | Age: 79
End: 2024-11-19
Payer: MEDICARE

## 2024-11-19 VITALS
SYSTOLIC BLOOD PRESSURE: 168 MMHG | WEIGHT: 157 LBS | HEART RATE: 84 BPM | OXYGEN SATURATION: 98 % | BODY MASS INDEX: 28.71 KG/M2 | DIASTOLIC BLOOD PRESSURE: 82 MMHG

## 2024-11-19 DIAGNOSIS — I48.0 PAF (PAROXYSMAL ATRIAL FIBRILLATION) (HCC): Primary | ICD-10-CM

## 2024-11-19 DIAGNOSIS — Z79.01 CURRENT USE OF ANTICOAGULANT THERAPY: ICD-10-CM

## 2024-11-19 DIAGNOSIS — I48.92 PAROXYSMAL ATRIAL FLUTTER (HCC): ICD-10-CM

## 2024-11-19 DIAGNOSIS — I10 ESSENTIAL HYPERTENSION: ICD-10-CM

## 2024-11-19 PROCEDURE — 99214 OFFICE O/P EST MOD 30 MIN: CPT | Performed by: INTERNAL MEDICINE

## 2024-11-19 PROCEDURE — 3077F SYST BP >= 140 MM HG: CPT | Performed by: INTERNAL MEDICINE

## 2024-11-19 PROCEDURE — 3079F DIAST BP 80-89 MM HG: CPT | Performed by: INTERNAL MEDICINE

## 2024-11-19 PROCEDURE — 1159F MED LIST DOCD IN RCRD: CPT | Performed by: INTERNAL MEDICINE

## 2024-11-19 PROCEDURE — 93000 ELECTROCARDIOGRAM COMPLETE: CPT | Performed by: INTERNAL MEDICINE

## 2024-11-19 PROCEDURE — 1123F ACP DISCUSS/DSCN MKR DOCD: CPT | Performed by: INTERNAL MEDICINE

## 2025-01-08 ENCOUNTER — TELEPHONE (OUTPATIENT)
Dept: CARDIOLOGY CLINIC | Age: 80
End: 2025-01-08

## 2025-01-08 NOTE — TELEPHONE ENCOUNTER
Patient states when she contacted the pharmacy they would only give her 45 days versus her normal 90 days.     Medication Refill    When was your last appointment with cardiology?    (If 1 yr or longer, please schedule appointment)    (If patient has been told they do not need to follow-up - medications should be filled by PCP)  11/19/24    When did you last have labs drawn?   10/19/24    Medication needing refilled?  apixaban (ELIQUIS)     Dosage of the medication?  5 MG TABS tablet     How are you taking this medication (QD, BID, TID, QID, PRN)?  Take 1 tablet by mouth 2 times daily     Do you want a 30 or 90 day supply?  90    When will you run out of your medication?   Has a few days left    Which Pharmacy are we sending this medication to?  [facility] - [place], [place] - [address] [place] [address]     Pharmacy Phone:[phone]   Pharmacy Fax:[phone]

## 2025-01-29 DIAGNOSIS — I10 ESSENTIAL HYPERTENSION: ICD-10-CM

## 2025-01-30 RX ORDER — AMLODIPINE BESYLATE 5 MG/1
5 TABLET ORAL NIGHTLY
Qty: 90 TABLET | Refills: 1 | Status: SHIPPED | OUTPATIENT
Start: 2025-01-30

## 2025-03-23 DIAGNOSIS — I10 ESSENTIAL HYPERTENSION: ICD-10-CM

## 2025-03-24 DIAGNOSIS — I10 ESSENTIAL HYPERTENSION: ICD-10-CM

## 2025-03-24 RX ORDER — AMLODIPINE BESYLATE 5 MG/1
5 TABLET ORAL NIGHTLY
Qty: 90 TABLET | Refills: 1 | Status: SHIPPED | OUTPATIENT
Start: 2025-03-24

## 2025-03-24 NOTE — TELEPHONE ENCOUNTER
Pt needs medication refill below    [phone]          Needs more than 1 refill  amLODIPine (NORVASC) 5 MG tablet [[id number]]     [facility] [id number] - [address] - P [phone] - F [phone]

## 2025-03-25 RX ORDER — LOSARTAN POTASSIUM 25 MG/1
25 TABLET ORAL DAILY
Qty: 90 TABLET | Refills: 1 | Status: SHIPPED | OUTPATIENT
Start: 2025-03-25

## 2025-04-11 ENCOUNTER — APPOINTMENT (OUTPATIENT)
Dept: GENERAL RADIOLOGY | Age: 80
DRG: 310 | End: 2025-04-11
Payer: MEDICARE

## 2025-04-11 ENCOUNTER — HOSPITAL ENCOUNTER (INPATIENT)
Age: 80
LOS: 1 days | Discharge: HOME | DRG: 310 | End: 2025-04-14
Attending: STUDENT IN AN ORGANIZED HEALTH CARE EDUCATION/TRAINING PROGRAM | Admitting: STUDENT IN AN ORGANIZED HEALTH CARE EDUCATION/TRAINING PROGRAM
Payer: MEDICARE

## 2025-04-11 DIAGNOSIS — R79.89 ELEVATED TROPONIN: ICD-10-CM

## 2025-04-11 DIAGNOSIS — R00.2 PALPITATIONS: ICD-10-CM

## 2025-04-11 DIAGNOSIS — R94.31 T WAVE INVERSION IN EKG: Primary | ICD-10-CM

## 2025-04-11 DIAGNOSIS — I48.11 LONGSTANDING PERSISTENT ATRIAL FIBRILLATION (HCC): ICD-10-CM

## 2025-04-11 PROBLEM — R07.9 CHEST PAIN AT REST: Status: ACTIVE | Noted: 2025-04-11

## 2025-04-11 PROBLEM — R07.9 CHEST PAIN: Status: ACTIVE | Noted: 2025-04-11

## 2025-04-11 PROBLEM — R07.9 CHEST PAIN AT REST: Status: RESOLVED | Noted: 2025-04-11 | Resolved: 2025-04-11

## 2025-04-11 LAB
ALBUMIN SERPL-MCNC: 4.4 G/DL (ref 3.4–5)
ALBUMIN/GLOB SERPL: 1.4 {RATIO} (ref 1.1–2.2)
ALP SERPL-CCNC: 116 U/L (ref 40–129)
ALT SERPL-CCNC: 19 U/L (ref 10–40)
ANION GAP SERPL CALCULATED.3IONS-SCNC: 12 MMOL/L (ref 3–16)
APTT BLD: 31 SEC (ref 22.1–36.4)
AST SERPL-CCNC: 33 U/L (ref 15–37)
BACTERIA URNS QL MICRO: ABNORMAL /HPF
BASOPHILS # BLD: 0.1 K/UL (ref 0–0.2)
BASOPHILS NFR BLD: 0.9 %
BILIRUB SERPL-MCNC: 0.7 MG/DL (ref 0–1)
BILIRUB UR QL STRIP.AUTO: NEGATIVE
BUN SERPL-MCNC: 20 MG/DL (ref 7–20)
CALCIUM SERPL-MCNC: 10 MG/DL (ref 8.3–10.6)
CHLORIDE SERPL-SCNC: 107 MMOL/L (ref 99–110)
CLARITY UR: CLEAR
CO2 SERPL-SCNC: 22 MMOL/L (ref 21–32)
COLOR UR: YELLOW
CREAT SERPL-MCNC: 1 MG/DL (ref 0.6–1.2)
DEPRECATED RDW RBC AUTO: 15.2 % (ref 12.4–15.4)
DEPRECATED RDW RBC AUTO: 15.3 % (ref 12.4–15.4)
EKG ATRIAL RATE: 65 BPM
EKG DIAGNOSIS: NORMAL
EKG P AXIS: 69 DEGREES
EKG P-R INTERVAL: 136 MS
EKG Q-T INTERVAL: 416 MS
EKG QRS DURATION: 88 MS
EKG QTC CALCULATION (BAZETT): 432 MS
EKG R AXIS: 53 DEGREES
EKG T AXIS: 223 DEGREES
EKG VENTRICULAR RATE: 65 BPM
EOSINOPHIL # BLD: 0 K/UL (ref 0–0.6)
EOSINOPHIL NFR BLD: 0.4 %
EPI CELLS #/AREA URNS AUTO: 6 /HPF (ref 0–5)
GFR SERPLBLD CREATININE-BSD FMLA CKD-EPI: 58 ML/MIN/{1.73_M2}
GLUCOSE SERPL-MCNC: 125 MG/DL (ref 70–99)
GLUCOSE UR STRIP.AUTO-MCNC: NEGATIVE MG/DL
HCT VFR BLD AUTO: 43.5 % (ref 36–48)
HCT VFR BLD AUTO: 44.4 % (ref 36–48)
HGB BLD-MCNC: 14.4 G/DL (ref 12–16)
HGB BLD-MCNC: 14.5 G/DL (ref 12–16)
HGB UR QL STRIP.AUTO: NEGATIVE
HYALINE CASTS #/AREA URNS AUTO: 8 /LPF (ref 0–8)
KETONES UR STRIP.AUTO-MCNC: ABNORMAL MG/DL
LEUKOCYTE ESTERASE UR QL STRIP.AUTO: ABNORMAL
LIPASE SERPL-CCNC: 10 U/L (ref 13–60)
LYMPHOCYTES # BLD: 1.3 K/UL (ref 1–5.1)
LYMPHOCYTES NFR BLD: 22.3 %
MAGNESIUM SERPL-MCNC: 2.12 MG/DL (ref 1.8–2.4)
MCH RBC QN AUTO: 28.7 PG (ref 26–34)
MCH RBC QN AUTO: 29.6 PG (ref 26–34)
MCHC RBC AUTO-ENTMCNC: 32.3 G/DL (ref 31–36)
MCHC RBC AUTO-ENTMCNC: 33.3 G/DL (ref 31–36)
MCV RBC AUTO: 88.9 FL (ref 80–100)
MCV RBC AUTO: 89.1 FL (ref 80–100)
MONOCYTES # BLD: 0.3 K/UL (ref 0–1.3)
MONOCYTES NFR BLD: 4.9 %
MUCUS: PRESENT
NEUTROPHILS # BLD: 4.3 K/UL (ref 1.7–7.7)
NEUTROPHILS NFR BLD: 71.5 %
NITRITE UR QL STRIP.AUTO: NEGATIVE
PH UR STRIP.AUTO: 5 [PH] (ref 5–8)
PLATELET # BLD AUTO: 199 K/UL (ref 135–450)
PLATELET # BLD AUTO: 221 K/UL (ref 135–450)
PMV BLD AUTO: 9.1 FL (ref 5–10.5)
PMV BLD AUTO: 9.5 FL (ref 5–10.5)
POTASSIUM SERPL-SCNC: 4.3 MMOL/L (ref 3.5–5.1)
PROT SERPL-MCNC: 7.6 G/DL (ref 6.4–8.2)
PROT UR STRIP.AUTO-MCNC: 30 MG/DL
RBC # BLD AUTO: 4.89 M/UL (ref 4–5.2)
RBC # BLD AUTO: 5 M/UL (ref 4–5.2)
RBC CLUMPS #/AREA URNS AUTO: 2 /HPF (ref 0–4)
SODIUM SERPL-SCNC: 141 MMOL/L (ref 136–145)
SP GR UR STRIP.AUTO: 1.02 (ref 1–1.03)
TROPONIN, HIGH SENSITIVITY: 21 NG/L (ref 0–14)
TROPONIN, HIGH SENSITIVITY: 22 NG/L (ref 0–14)
TROPONIN, HIGH SENSITIVITY: <6 NG/L (ref 0–14)
TSH SERPL DL<=0.005 MIU/L-ACNC: 0.91 UIU/ML (ref 0.27–4.2)
TSH SERPL DL<=0.005 MIU/L-ACNC: 0.92 UIU/ML (ref 0.27–4.2)
UA COMPLETE W REFLEX CULTURE PNL UR: ABNORMAL
UA DIPSTICK W REFLEX MICRO PNL UR: YES
URN SPEC COLLECT METH UR: ABNORMAL
UROBILINOGEN UR STRIP-ACNC: 1 E.U./DL
WBC # BLD AUTO: 6 K/UL (ref 4–11)
WBC # BLD AUTO: 6.8 K/UL (ref 4–11)
WBC #/AREA URNS AUTO: 7 /HPF (ref 0–5)

## 2025-04-11 PROCEDURE — 84443 ASSAY THYROID STIM HORMONE: CPT

## 2025-04-11 PROCEDURE — 85027 COMPLETE CBC AUTOMATED: CPT

## 2025-04-11 PROCEDURE — 85730 THROMBOPLASTIN TIME PARTIAL: CPT

## 2025-04-11 PROCEDURE — 83735 ASSAY OF MAGNESIUM: CPT

## 2025-04-11 PROCEDURE — G0378 HOSPITAL OBSERVATION PER HR: HCPCS

## 2025-04-11 PROCEDURE — 71046 X-RAY EXAM CHEST 2 VIEWS: CPT

## 2025-04-11 PROCEDURE — 36415 COLL VENOUS BLD VENIPUNCTURE: CPT

## 2025-04-11 PROCEDURE — 6370000000 HC RX 637 (ALT 250 FOR IP): Performed by: PHYSICIAN ASSISTANT

## 2025-04-11 PROCEDURE — 81001 URINALYSIS AUTO W/SCOPE: CPT

## 2025-04-11 PROCEDURE — 84484 ASSAY OF TROPONIN QUANT: CPT

## 2025-04-11 PROCEDURE — 93010 ELECTROCARDIOGRAM REPORT: CPT | Performed by: INTERNAL MEDICINE

## 2025-04-11 PROCEDURE — 6360000002 HC RX W HCPCS: Performed by: HOSPITALIST

## 2025-04-11 PROCEDURE — 85025 COMPLETE CBC W/AUTO DIFF WBC: CPT

## 2025-04-11 PROCEDURE — 99285 EMERGENCY DEPT VISIT HI MDM: CPT

## 2025-04-11 PROCEDURE — 96374 THER/PROPH/DIAG INJ IV PUSH: CPT

## 2025-04-11 PROCEDURE — 83690 ASSAY OF LIPASE: CPT

## 2025-04-11 PROCEDURE — 93005 ELECTROCARDIOGRAM TRACING: CPT | Performed by: PHYSICIAN ASSISTANT

## 2025-04-11 PROCEDURE — 6370000000 HC RX 637 (ALT 250 FOR IP): Performed by: HOSPITALIST

## 2025-04-11 PROCEDURE — 80053 COMPREHEN METABOLIC PANEL: CPT

## 2025-04-11 PROCEDURE — 2500000003 HC RX 250 WO HCPCS: Performed by: HOSPITALIST

## 2025-04-11 RX ORDER — MAGNESIUM SULFATE IN WATER 40 MG/ML
2000 INJECTION, SOLUTION INTRAVENOUS PRN
Status: DISCONTINUED | OUTPATIENT
Start: 2025-04-11 | End: 2025-04-14 | Stop reason: HOSPADM

## 2025-04-11 RX ORDER — SODIUM CHLORIDE 9 MG/ML
INJECTION, SOLUTION INTRAVENOUS PRN
Status: DISCONTINUED | OUTPATIENT
Start: 2025-04-11 | End: 2025-04-14 | Stop reason: HOSPADM

## 2025-04-11 RX ORDER — HEPARIN SODIUM 1000 [USP'U]/ML
2000 INJECTION, SOLUTION INTRAVENOUS; SUBCUTANEOUS PRN
Status: CANCELLED | OUTPATIENT
Start: 2025-04-11

## 2025-04-11 RX ORDER — ACETAMINOPHEN 650 MG/1
650 SUPPOSITORY RECTAL EVERY 6 HOURS PRN
Status: DISCONTINUED | OUTPATIENT
Start: 2025-04-11 | End: 2025-04-14 | Stop reason: HOSPADM

## 2025-04-11 RX ORDER — SODIUM CHLORIDE 0.9 % (FLUSH) 0.9 %
5-40 SYRINGE (ML) INJECTION PRN
Status: DISCONTINUED | OUTPATIENT
Start: 2025-04-11 | End: 2025-04-14 | Stop reason: HOSPADM

## 2025-04-11 RX ORDER — ONDANSETRON 4 MG/1
4 TABLET, ORALLY DISINTEGRATING ORAL EVERY 8 HOURS PRN
Status: DISCONTINUED | OUTPATIENT
Start: 2025-04-11 | End: 2025-04-14 | Stop reason: HOSPADM

## 2025-04-11 RX ORDER — AMLODIPINE BESYLATE 5 MG/1
5 TABLET ORAL NIGHTLY
Status: DISCONTINUED | OUTPATIENT
Start: 2025-04-12 | End: 2025-04-14 | Stop reason: HOSPADM

## 2025-04-11 RX ORDER — SODIUM CHLORIDE 0.9 % (FLUSH) 0.9 %
5-40 SYRINGE (ML) INJECTION EVERY 12 HOURS SCHEDULED
Status: DISCONTINUED | OUTPATIENT
Start: 2025-04-11 | End: 2025-04-14 | Stop reason: HOSPADM

## 2025-04-11 RX ORDER — ASPIRIN 81 MG/1
81 TABLET, CHEWABLE ORAL DAILY
Status: CANCELLED | OUTPATIENT
Start: 2025-04-12

## 2025-04-11 RX ORDER — POLYETHYLENE GLYCOL 3350 17 G/17G
17 POWDER, FOR SOLUTION ORAL DAILY PRN
Status: DISCONTINUED | OUTPATIENT
Start: 2025-04-11 | End: 2025-04-14 | Stop reason: HOSPADM

## 2025-04-11 RX ORDER — HEPARIN SODIUM 1000 [USP'U]/ML
4000 INJECTION, SOLUTION INTRAVENOUS; SUBCUTANEOUS ONCE
Status: CANCELLED | OUTPATIENT
Start: 2025-04-11 | End: 2025-04-11

## 2025-04-11 RX ORDER — ATORVASTATIN CALCIUM 40 MG/1
40 TABLET, FILM COATED ORAL NIGHTLY
Status: CANCELLED | OUTPATIENT
Start: 2025-04-11

## 2025-04-11 RX ORDER — HYDRALAZINE HYDROCHLORIDE 20 MG/ML
5 INJECTION INTRAMUSCULAR; INTRAVENOUS EVERY 4 HOURS PRN
Status: DISCONTINUED | OUTPATIENT
Start: 2025-04-11 | End: 2025-04-12

## 2025-04-11 RX ORDER — ONDANSETRON 2 MG/ML
4 INJECTION INTRAMUSCULAR; INTRAVENOUS EVERY 6 HOURS PRN
Status: DISCONTINUED | OUTPATIENT
Start: 2025-04-11 | End: 2025-04-14 | Stop reason: HOSPADM

## 2025-04-11 RX ORDER — ACETAMINOPHEN 325 MG/1
650 TABLET ORAL EVERY 6 HOURS PRN
Status: DISCONTINUED | OUTPATIENT
Start: 2025-04-11 | End: 2025-04-14 | Stop reason: HOSPADM

## 2025-04-11 RX ORDER — POTASSIUM CHLORIDE 1500 MG/1
40 TABLET, EXTENDED RELEASE ORAL PRN
Status: DISCONTINUED | OUTPATIENT
Start: 2025-04-11 | End: 2025-04-14 | Stop reason: HOSPADM

## 2025-04-11 RX ORDER — POTASSIUM CHLORIDE 7.45 MG/ML
10 INJECTION INTRAVENOUS PRN
Status: DISCONTINUED | OUTPATIENT
Start: 2025-04-11 | End: 2025-04-14 | Stop reason: HOSPADM

## 2025-04-11 RX ORDER — HEPARIN SODIUM 1000 [USP'U]/ML
4000 INJECTION, SOLUTION INTRAVENOUS; SUBCUTANEOUS PRN
Status: CANCELLED | OUTPATIENT
Start: 2025-04-11

## 2025-04-11 RX ORDER — VITAMIN B COMPLEX
2000 TABLET ORAL DAILY
Status: DISCONTINUED | OUTPATIENT
Start: 2025-04-12 | End: 2025-04-14 | Stop reason: HOSPADM

## 2025-04-11 RX ORDER — ASPIRIN 81 MG/1
324 TABLET, CHEWABLE ORAL ONCE
Status: COMPLETED | OUTPATIENT
Start: 2025-04-11 | End: 2025-04-11

## 2025-04-11 RX ORDER — LOSARTAN POTASSIUM 25 MG/1
25 TABLET ORAL DAILY
Status: DISCONTINUED | OUTPATIENT
Start: 2025-04-12 | End: 2025-04-14 | Stop reason: HOSPADM

## 2025-04-11 RX ORDER — HEPARIN SODIUM 10000 [USP'U]/100ML
5-30 INJECTION, SOLUTION INTRAVENOUS CONTINUOUS
Status: CANCELLED | OUTPATIENT
Start: 2025-04-11

## 2025-04-11 RX ADMIN — SODIUM CHLORIDE, PRESERVATIVE FREE 10 ML: 5 INJECTION INTRAVENOUS at 21:26

## 2025-04-11 RX ADMIN — HYDRALAZINE HYDROCHLORIDE 5 MG: 20 INJECTION INTRAMUSCULAR; INTRAVENOUS at 21:28

## 2025-04-11 RX ADMIN — ASPIRIN 324 MG: 81 TABLET, CHEWABLE ORAL at 19:37

## 2025-04-11 RX ADMIN — APIXABAN 5 MG: 5 TABLET, FILM COATED ORAL at 21:27

## 2025-04-11 ASSESSMENT — HEART SCORE: ECG: NON-SPECIFC REPOLARIZATION DISTURBANCE/LBTB/PM

## 2025-04-11 ASSESSMENT — PAIN - FUNCTIONAL ASSESSMENT: PAIN_FUNCTIONAL_ASSESSMENT: NONE - DENIES PAIN

## 2025-04-11 NOTE — PROGRESS NOTES
Medication Reconciliation    List of medications patient is currently taking is complete.     Source of information: 1. Conversation with patient's caregiver at bedside                                      2. EPIC records        Notes regarding home medications:   1. Patient reports taking AM medications PTA.        Juanita Lew, Pharmacy Intern   4/11/2025  7:10 PM

## 2025-04-11 NOTE — H&P
V2.0  History and Physical      Name:  Rosanna Stanford /Age/Sex: 1945  (79 y.o. female)   MRN & CSN:  [id number] & [id number] Encounter Date/Time: 2025 7:49 PM EDT   Location:  [address] PCP: Lou Underwood MD       Hospital Day: 1    Assessment and Plan:   Rosanna Stanford is a 79 y.o. female with a pmh of diabetes mellitus type 2; paroxysmal A-fib and hypertension who presents with Chest pain    Hospital Problems           Last Modified POA    * (Principal) Paroxysmal atrial fibrillation (HCC) 10/18/2024 Yes    Overview Signed 2024  9:55 AM by Lou Underwood MD   First noted on ECG in 2022  S/p Ablation with Dr. Zapata 10/17/2023         Hypertension 10/18/2024 Yes    Mixed hyperlipidemia 10/18/2024 Yes    Type 2 diabetes mellitus with hyperglycemia 10/18/2024 Yes    Chest pain at rest 2025 Yes        Plan:  Patient constellation of symptoms is likely secondary to atrial fibrillation.  EKG compared to previous shows somewhat worsening of T wave inversions.  Will continue patient on her Eliquis.  Daily aspirin ordered.  Cardiology consult.  Blood pressure is now within goal.  Home blood pressure medications-amlodipine and ACE receptor blockers will be continued.  As needed hydralazine will be added.  Diabetes mellitus-blood glucose is stable.  BMP in AM.  Lipids in  reviewed.  Noted to have hyperlipidemia.  Check lipid panel in AM.  Elevated troponin-likely secondary to paroxysmal A-fib.  Trended.      Advance care planning:  Advanced care planning was discussed with patient for a total of  16 minutes.  Full code, DNR CC, DNR CCA, power of  and living will were discussed.  Patient elected to be  a full code.   Disposition:   Current Living situation: Home  Expected Disposition: Home  Estimated D/C: 2 days  Diet No diet orders on file   DVT Prophylaxis [] Lovenox, []  Heparin, [] SCDs, [] Ambulation,  [] Eliquis, [] Xarelto, [] Coumadin   Code Status Full code   Surrogate    Physical Exam    General: NAD  Eyes: EOMI  ENT: neck supple  Cardiovascular: Regular rate.  Respiratory: Clear to auscultation  Gastrointestinal: Soft, non tender  Genitourinary: no suprapubic tenderness  Musculoskeletal: No edema  Skin: warm, dry  Neuro: Alert.  Psych: Mood appropriate.       Past Medical History:   PMHx   Past Medical History:   Diagnosis Date    Atrial fibrillation (HCC)     Back pain 06/15/2010    Herniated disc 06/01/2000    Chiropractor Dr Bowden, lumbar , left     Hyperlipidemia     Hypertension     Palpitations 06/15/2010     PSHX:  has a past surgical history that includes Colonoscopy (2011).  Allergies:   Allergies   Allergen Reactions    Cephalosporins     Penicillins     Sulfa Antibiotics      Fam HX: family history includes Breast Cancer in her sister; Diabetes in her maternal aunt and sister; Hypertension in her sister and another family member.  Soc HX:   Social History     Socioeconomic History    Marital status:      Spouse name: None    Number of children: 4    Years of education: None    Highest education level: None   Occupational History     Comment: disabled   Tobacco Use    Smoking status: Never    Smokeless tobacco: Never   Vaping Use    Vaping status: Never Used   Substance and Sexual Activity    Alcohol use: No     Alcohol/week: 0.0 standard drinks of alcohol    Drug use: No    Sexual activity: Not Currently     Social Drivers of Health     Financial Resource Strain: Low Risk  (4/18/2024)    Overall Financial Resource Strain (CARDIA)     Difficulty of Paying Living Expenses: Not hard at all   Food Insecurity: No Food Insecurity (4/18/2024)    Hunger Vital Sign     Worried About Running Out of Food in the Last Year: Never true     Ran Out of Food in the Last Year: Never true   Transportation Needs: Unknown (4/18/2024)    PRAPARE - Transportation     Lack of Transportation (Non-Medical): No   Physical Activity: Insufficiently Active (4/18/2024)    Exercise Vital Sign

## 2025-04-11 NOTE — ED TRIAGE NOTES
Pt has felt nauseous and lightheaded since this morning. Pt take amlodipine for her blood pressure and states sometimes her blood pressure medication makes her feel this way. Pt denies throwing up, and denies fever or chills.

## 2025-04-11 NOTE — ED PROVIDER NOTES
[facility] EMERGENCY DEPARTMENT  EMERGENCY DEPARTMENT ENCOUNTER        Pt Name: Rosanna Stanford  MRN: [id number]  Birthdate 1945  Date of evaluation: 4/11/2025  Provider: Martha Bets PA-C  PCP: Lou Underwood MD  Note Started: 2:44 PM EDT 4/11/25       I have seen and evaluated this patient with my supervising physician Johnson Cedillo MD.      CHIEF COMPLAINT       Chief Complaint   Patient presents with    Nausea     Pt has felt nauseous and lightheaded since this morning. Pt take amlodipine for her blood pressure and states sometimes her blood pressure medication makes her feel this way. Pt denies throwing up, and denies fever or chills.        HISTORY OF PRESENT ILLNESS: 1 or more Elements     History From: patient    Rosanna Stanford is a 79 y.o. female who presents for nausea, palpitations, and generalized weakness.  Patient was at the grocery store around noon when she felt nauseous with generalized weakness and palpitations.  The palpitations lasted less than 10 minutes.  Reports she has had these symptoms in the past that have been attributed to her amlodipine.  She denies chest pain shortness of breath.  Denies vomiting or abdominal pain.  Denies numbness, tingling, vision changes, difficulty talking or walking.      Nursing Notes were reviewed and agreed with or any disagreements were addressed in the HPI.    REVIEW OF SYSTEMS :      Review of Systems    Positives and Pertinent negatives as per HPI.     SURGICAL HISTORY     Past Surgical History:   Procedure Laterality Date    COLONOSCOPY  2011    Valdez Ruiz MD       CURRENTMEDICATIONS       Previous Medications    AMLODIPINE (NORVASC) 5 MG TABLET    Take 1 tablet by mouth nightly    APIXABAN (ELIQUIS) 5 MG TABS TABLET    Take 1 tablet by mouth 2 times daily    CHOLECALCIFEROL (VITAMIN D) 50 MCG (2000 UT) CAPS CAPSULE    Take by mouth    LOSARTAN (COZAAR) 25 MG TABLET    TAKE 1 TABLET BY MOUTH DAILY       ALLERGIES    Status: She is alert.   Psychiatric:         Mood and Affect: Mood normal.         Behavior: Behavior normal.         Thought Content: Thought content normal.         Judgment: Judgment normal.             DIAGNOSTIC RESULTS   LABS:    Labs Reviewed   COMPREHENSIVE METABOLIC PANEL W/ REFLEX TO MG FOR LOW K - Abnormal; Notable for the following components:       Result Value    Glucose 125 (*)     Est, Glom Filt Rate 58 (*)     All other components within normal limits   TROPONIN - Abnormal; Notable for the following components:    Troponin, High Sensitivity 22 (*)     All other components within normal limits   LIPASE - Abnormal; Notable for the following components:    Lipase 10.0 (*)     All other components within normal limits   URINALYSIS WITH REFLEX TO CULTURE - Abnormal; Notable for the following components:    Ketones, Urine TRACE (*)     Protein, UA 30 (*)     Leukocyte Esterase, Urine TRACE (*)     All other components within normal limits   MICROSCOPIC URINALYSIS - Abnormal; Notable for the following components:    Bacteria, UA 3+ (*)     WBC, UA 7 (*)     Epithelial Cells, UA 6 (*)     Mucus, UA Present (*)     All other components within normal limits   TROPONIN - Abnormal; Notable for the following components:    Troponin, High Sensitivity 21 (*)     All other components within normal limits   CBC WITH AUTO DIFFERENTIAL   TROPONIN   TSH REFLEX TO FT4   LIPID PANEL   TSH REFLEX TO FT4, T3   MAGNESIUM       When ordered only abnormal lab results are displayed. All other labs were within normal range or not returned as of this dictation.    EKG: When ordered, EKG's are interpreted by the Emergency Department Physician in the absence of a cardiologist.  Please see their note for interpretation of EKG.    EKG obtained.  See physician note for interpretation    RADIOLOGY:   Non-plain film images such as CT, Ultrasound and MRI are read by the radiologist. Plain radiographic images are visualized and

## 2025-04-12 LAB
ANION GAP SERPL CALCULATED.3IONS-SCNC: 11 MMOL/L (ref 3–16)
APTT BLD: 30.9 SEC (ref 22.1–36.4)
APTT BLD: 31.4 SEC (ref 22.1–36.4)
APTT BLD: 32.3 SEC (ref 22.1–36.4)
APTT BLD: 32.3 SEC (ref 22.1–36.4)
BUN SERPL-MCNC: 16 MG/DL (ref 7–20)
CALCIUM SERPL-MCNC: 9.6 MG/DL (ref 8.3–10.6)
CHLORIDE SERPL-SCNC: 108 MMOL/L (ref 99–110)
CHOLEST SERPL-MCNC: 216 MG/DL (ref 0–199)
CO2 SERPL-SCNC: 23 MMOL/L (ref 21–32)
CREAT SERPL-MCNC: 0.8 MG/DL (ref 0.6–1.2)
DEPRECATED RDW RBC AUTO: 15.2 % (ref 12.4–15.4)
GFR SERPLBLD CREATININE-BSD FMLA CKD-EPI: 75 ML/MIN/{1.73_M2}
GLUCOSE SERPL-MCNC: 100 MG/DL (ref 70–99)
HCT VFR BLD AUTO: 39.8 % (ref 36–48)
HDLC SERPL-MCNC: 62 MG/DL (ref 40–60)
HGB BLD-MCNC: 12.8 G/DL (ref 12–16)
LDLC SERPL CALC-MCNC: 141 MG/DL
MCH RBC QN AUTO: 28.4 PG (ref 26–34)
MCHC RBC AUTO-ENTMCNC: 32.1 G/DL (ref 31–36)
MCV RBC AUTO: 88.4 FL (ref 80–100)
PLATELET # BLD AUTO: 182 K/UL (ref 135–450)
PMV BLD AUTO: 9.5 FL (ref 5–10.5)
POTASSIUM SERPL-SCNC: 3.6 MMOL/L (ref 3.5–5.1)
RBC # BLD AUTO: 4.51 M/UL (ref 4–5.2)
SODIUM SERPL-SCNC: 142 MMOL/L (ref 136–145)
TRIGL SERPL-MCNC: 67 MG/DL (ref 0–150)
TROPONIN, HIGH SENSITIVITY: 24 NG/L (ref 0–14)
TROPONIN, HIGH SENSITIVITY: 24 NG/L (ref 0–14)
TROPONIN, HIGH SENSITIVITY: 30 NG/L (ref 0–14)
VLDLC SERPL CALC-MCNC: 13 MG/DL
WBC # BLD AUTO: 6.1 K/UL (ref 4–11)

## 2025-04-12 PROCEDURE — 84484 ASSAY OF TROPONIN QUANT: CPT

## 2025-04-12 PROCEDURE — 99223 1ST HOSP IP/OBS HIGH 75: CPT | Performed by: INTERNAL MEDICINE

## 2025-04-12 PROCEDURE — 80048 BASIC METABOLIC PNL TOTAL CA: CPT

## 2025-04-12 PROCEDURE — 85027 COMPLETE CBC AUTOMATED: CPT

## 2025-04-12 PROCEDURE — 2500000003 HC RX 250 WO HCPCS: Performed by: HOSPITALIST

## 2025-04-12 PROCEDURE — 80061 LIPID PANEL: CPT

## 2025-04-12 PROCEDURE — 36415 COLL VENOUS BLD VENIPUNCTURE: CPT

## 2025-04-12 PROCEDURE — 85730 THROMBOPLASTIN TIME PARTIAL: CPT

## 2025-04-12 PROCEDURE — 6370000000 HC RX 637 (ALT 250 FOR IP): Performed by: HOSPITALIST

## 2025-04-12 PROCEDURE — G0378 HOSPITAL OBSERVATION PER HR: HCPCS

## 2025-04-12 PROCEDURE — 94760 N-INVAS EAR/PLS OXIMETRY 1: CPT

## 2025-04-12 RX ORDER — METOPROLOL TARTRATE 25 MG/1
12.5 TABLET, FILM COATED ORAL DAILY
Status: DISCONTINUED | OUTPATIENT
Start: 2025-04-12 | End: 2025-04-14

## 2025-04-12 RX ADMIN — APIXABAN 5 MG: 5 TABLET, FILM COATED ORAL at 20:23

## 2025-04-12 RX ADMIN — LOSARTAN POTASSIUM 25 MG: 25 TABLET, FILM COATED ORAL at 08:39

## 2025-04-12 RX ADMIN — Medication 2000 UNITS: at 08:39

## 2025-04-12 RX ADMIN — SODIUM CHLORIDE, PRESERVATIVE FREE 10 ML: 5 INJECTION INTRAVENOUS at 08:40

## 2025-04-12 RX ADMIN — APIXABAN 5 MG: 5 TABLET, FILM COATED ORAL at 08:39

## 2025-04-12 RX ADMIN — AMLODIPINE BESYLATE 5 MG: 5 TABLET ORAL at 20:23

## 2025-04-12 RX ADMIN — SODIUM CHLORIDE, PRESERVATIVE FREE 10 ML: 5 INJECTION INTRAVENOUS at 20:22

## 2025-04-12 ASSESSMENT — PAIN DESCRIPTION - FREQUENCY
FREQUENCY: INTERMITTENT
FREQUENCY: INTERMITTENT

## 2025-04-12 ASSESSMENT — PAIN SCALES - GENERAL
PAINLEVEL_OUTOF10: 0
PAINLEVEL_OUTOF10: 7
PAINLEVEL_OUTOF10: 0
PAINLEVEL_OUTOF10: 0
PAINLEVEL_OUTOF10: 7

## 2025-04-12 ASSESSMENT — PAIN DESCRIPTION - DESCRIPTORS
DESCRIPTORS: ACHING;CRAMPING;SPASM
DESCRIPTORS: ACHING;CRAMPING;SPASM

## 2025-04-12 ASSESSMENT — PAIN DESCRIPTION - ORIENTATION
ORIENTATION: RIGHT;POSTERIOR;UPPER
ORIENTATION: POSTERIOR;RIGHT;UPPER

## 2025-04-12 ASSESSMENT — PAIN DESCRIPTION - PAIN TYPE
TYPE: CHRONIC PAIN
TYPE: CHRONIC PAIN

## 2025-04-12 ASSESSMENT — PAIN DESCRIPTION - LOCATION
LOCATION: BACK
LOCATION: BACK

## 2025-04-12 NOTE — ED NOTES
ED TO INPATIENT SBAR HANDOFF    Patient Name: Rosanna Stanford   Preferred Name: Rosanna  : 1945  79 y.o.   Family/Caregiver Present: no   Code Status Order: Prior  PO Status: NPO:No  Telemetry Order: Yes  C-SSRS: Risk of Suicide: No Risk  Sitter no     Restraints:     Sepsis Risk Score      Situation  Chief Complaint   Patient presents with    Nausea     Pt has felt nauseous and lightheaded since this morning. Pt take amlodipine for her blood pressure and states sometimes her blood pressure medication makes her feel this way. Pt denies throwing up, and denies fever or chills.      Brief Description of Patient's Condition:   Mental Status: alert  Arrived from:Home  Imaging:   XR CHEST (2 VW)   Final Result   No acute cardiopulmonary findings           Abnormal labs:   Abnormal Labs Reviewed   COMPREHENSIVE METABOLIC PANEL W/ REFLEX TO MG FOR LOW K - Abnormal; Notable for the following components:       Result Value    Glucose 125 (*)     Est, Glom Filt Rate 58 (*)     All other components within normal limits   TROPONIN - Abnormal; Notable for the following components:    Troponin, High Sensitivity 22 (*)     All other components within normal limits   LIPASE - Abnormal; Notable for the following components:    Lipase 10.0 (*)     All other components within normal limits   URINALYSIS WITH REFLEX TO CULTURE - Abnormal; Notable for the following components:    Ketones, Urine TRACE (*)     Protein, UA 30 (*)     Leukocyte Esterase, Urine TRACE (*)     All other components within normal limits   MICROSCOPIC URINALYSIS - Abnormal; Notable for the following components:    Bacteria, UA 3+ (*)     WBC, UA 7 (*)     Epithelial Cells, UA 6 (*)     Mucus, UA Present (*)     All other components within normal limits   TROPONIN - Abnormal; Notable for the following components:    Troponin, High Sensitivity 21 (*)     All other components within normal limits       Background  Allergies:   Allergies   Allergen Reactions

## 2025-04-12 NOTE — PROGRESS NOTES
[facility]     Electrophysiology                                     Progress Note    Admission date:  2025    Reason for follow up visit: PAF    HPI/CC: Rosanna Stanford is a 79 y.o. female with a pertinent past medical history including hypertension, hyperlipidemia, DM type II, and paroxysmal atrial fibrillation (s/p ablation 10/17/2023 with PVI and left atrial lines by Dr. Zapata).  She presented  to [facility] with complaints of palpitations, dizziness, and cold sweats.     Upon admission EKG showed sinus rhythm with T wave inversion unchanged from previous EKG.  However slightly uptrending troponin, Cardiology consulted.     Subjective: She feels well this AM. Denies chest pain, shortness of breath, and dizziness. Endorses intermittent palpitations, not precipitated by movement, random throughout the day, self-limiting lasting ~10-15 seconds.     Vitals:  Blood pressure (!) 162/82, pulse 66, temperature 98.1 °F (36.7 °C), temperature source Oral, resp. rate 18, height 1.575 m (5' 2\"), weight 69 kg (152 lb 1.9 oz), SpO2 100%, not currently breastfeeding.  Temp  Av.3 °F (36.8 °C)  Min: 97.8 °F (36.6 °C)  Max: 98.8 °F (37.1 °C)  Pulse  Av.5  Min: 66  Max: 81  BP  Min: 140/77  Max: 173/84  SpO2  Av.7 %  Min: 98 %  Max: 100 %    24 hour I/O    Intake/Output Summary (Last 24 hours) at 2025  Last data filed at 2025 1401  Gross per 24 hour   Intake 1110 ml   Output --   Net 1110 ml     Current Facility-Administered Medications   Medication Dose Route Frequency Provider Last Rate Last Admin    amLODIPine (NORVASC) tablet 5 mg  5 mg Oral Nightly Hayden Hdez MD        Vitamin D (CHOLECALCIFEROL) tablet 2,000 Units  2,000 Units Oral Daily Hayden Hdez MD   2,000 Units at 25 0839    losartan (COZAAR) tablet 25 mg  25 mg Oral Daily Hayden Hdez MD   25 mg at 25 0839    sodium chloride flush 0.9 % injection 5-40 mL  5-40 mL IntraVENous 2  breath or chest pain during her walks. She does have episodic SVT vs PAT on monitor. Patient states she does notice increase HR/feels palpitations at home, but episodes are self-limiting and usually occur ~1x/day typically after taking her BP medications.     Plan:   1. Continue Eliquis 5mg BID  2. Start Metoprolol 12.5mg, if tolerates increase to 25mg   3. Continue Norvasc and Losartan   4. Repeat Echo  5. CTA Monday    - NPO at least 4hr prior     Needs follow up with Cardiology (Patient would like to follow with Dr. Hendricks) after CTA/Echo if abnormal  Otherwise follows with Dr Breonna Madera, APRN-DNP  [facility] [place]  [phone]

## 2025-04-12 NOTE — CONSULTS
Referring Physician: * No referring provider recorded for this case *  Reason for Consultation: Atrial fibrillation, NSTEMI  Chief Complaint: Chest pain      Subjective:   History of Present Illness:  Rosanna Stanford is a 79 y.o. patient with prior history of paroxysmal atrial fibrillation, hypertension, hyperlipidemia who presented to the hospital with complaints of heart palpitation and dizziness.  She did break out into mild cold sweats there is a mention of chest pain from the history but patient denies having any chest tightness to me.  She was brought to the hospital EKG revealed sinus rhythm  She has been admitted for further evaluation.  Her EKG had shown T wave inversion which appears unchanged from before but her troponin is slightly uptrending.  She denies having any further chest pain or palpitation.  More her monitor shows no atrial fibrillation yet  I have been asked to provide consultation regarding further management and testing.    Review of Systems:   All 12 point review of symptoms completed. Pertinent positives identified in the HPI, all other review of symptoms negative as below.    Past Medical History:   has a past medical history of Atrial fibrillation (HCC), Back pain, Herniated disc, Hyperlipidemia, Hypertension, and Palpitations.    Surgical History:   has a past surgical history that includes Colonoscopy (2011).     Social History:   reports that she has never smoked. She has never used smokeless tobacco. She reports that she does not drink alcohol and does not use drugs.     Family History:  family history includes Breast Cancer in her sister; Diabetes in her maternal aunt and sister; Hypertension in her sister and another family member.      Home Medications:  Were reviewed and are listed in nursing record and/or below  Prior to Admission medications    Medication Sig Start Date End Date Taking? Authorizing Provider   losartan (COZAAR) 25 MG tablet TAKE 1 TABLET BY MOUTH DAILY 3/25/25

## 2025-04-12 NOTE — PROGRESS NOTES
04/11/25 2248 04/12/25 0311 04/12/25 0415 04/12/25 0742   BP: (!) 145/54  (!) 166/56    Pulse:       Resp: 16  18 18   Temp: 98.8 °F (37.1 °C)  98.3 °F (36.8 °C)    TempSrc: Oral  Oral    SpO2: 99%  98% 98%   Weight:  69 kg (152 lb 1.9 oz)     Height:             Physical Exam:    Physical Exam  Constitutional:       General: She is not in acute distress.  Cardiovascular:      Rate and Rhythm: Normal rate and regular rhythm.      Heart sounds: No murmur heard.  Pulmonary:      Effort: Pulmonary effort is normal.      Breath sounds: No wheezing, rhonchi or rales.   Abdominal:      General: There is no distension.      Palpations: Abdomen is soft.      Tenderness: There is no abdominal tenderness.   Musculoskeletal:      Right lower leg: No edema.      Left lower leg: No edema.   Neurological:      Mental Status: She is alert.          Medications:   Medications:    amLODIPine  5 mg Oral Nightly    Vitamin D  2,000 Units Oral Daily    losartan  25 mg Oral Daily    sodium chloride flush  5-40 mL IntraVENous 2 times per day    apixaban  5 mg Oral BID      Infusions:    sodium chloride       PRN Meds: sodium chloride flush, 5-40 mL, PRN  sodium chloride, , PRN  potassium chloride, 40 mEq, PRN   Or  potassium alternative oral replacement, 40 mEq, PRN   Or  potassium chloride, 10 mEq, PRN  magnesium sulfate, 2,000 mg, PRN  ondansetron, 4 mg, Q8H PRN   Or  ondansetron, 4 mg, Q6H PRN  acetaminophen, 650 mg, Q6H PRN   Or  acetaminophen, 650 mg, Q6H PRN  polyethylene glycol, 17 g, Daily PRN  hydrALAZINE, 5 mg, Q4H PRN        Labs and Imaging   XR CHEST (2 VW)  Result Date: 4/11/2025  EXAMINATION: TWO XRAY VIEWS OF THE CHEST 4/11/2025 2:10 pm COMPARISON: None. HISTORY: ORDERING SYSTEM PROVIDED HISTORY: palpitations TECHNOLOGIST PROVIDED HISTORY: Reason for exam:->palpitations FINDINGS: Normal cardiomediastinal silhouette.  No acute airspace infiltrate.  No pneumothorax or pleural effusion     No acute cardiopulmonary findings        CBC:   Recent Labs     04/11/25  1504 04/11/25 2055 04/12/25  0443   WBC 6.0 6.8 6.1   HGB 14.4 14.5 12.8    221 182     BMP:    Recent Labs     04/11/25  1504 04/12/25  0443    142   K 4.3 3.6    108   CO2 22 23   BUN 20 16   CREATININE 1.0 0.8   GLUCOSE 125* 100*     Hepatic:   Recent Labs     04/11/25  1504   AST 33   ALT 19   BILITOT 0.7   ALKPHOS 116     Lipids:   Lab Results   Component Value Date/Time    CHOL 216 04/12/2025 04:43 AM    HDL 62 04/12/2025 04:43 AM    HDL 67 02/16/2012 11:20 AM    TRIG 67 04/12/2025 04:43 AM     Hemoglobin A1C:   Lab Results   Component Value Date/Time    LABA1C 6.5 04/15/2024 09:53 AM     TSH:   Lab Results   Component Value Date/Time    TSH 0.92 04/11/2025 05:15 PM     Troponin: No results found for: \"TROPONINT\"  Lactic Acid: No results for input(s): \"LACTA\" in the last 72 hours.  BNP: No results for input(s): \"PROBNP\" in the last 72 hours.  UA:  Lab Results   Component Value Date/Time    NITRU Negative 04/11/2025 03:04 PM    COLORU Yellow 04/11/2025 03:04 PM    PHUR 5.0 04/11/2025 03:04 PM    PHUR 5.5 07/08/2019 08:33 PM    WBCUA 7 04/11/2025 03:04 PM    RBCUA 2 04/11/2025 03:04 PM    MUCUS Present 04/11/2025 03:04 PM    BACTERIA 3+ 04/11/2025 03:04 PM    CLARITYU Clear 04/11/2025 03:04 PM    LEUKOCYTESUR TRACE 04/11/2025 03:04 PM    UROBILINOGEN 1.0 04/11/2025 03:04 PM    BILIRUBINUR Negative 04/11/2025 03:04 PM    BLOODU Negative 04/11/2025 03:04 PM    GLUCOSEU Negative 04/11/2025 03:04 PM    KETUA TRACE 04/11/2025 03:04 PM     Urine Cultures: No results found for: \"LABURIN\"  Blood Cultures: No results found for: \"BC\"  No results found for: \"BLOODCULT2\"  Organism: No results found for: \"ORG\"      Electronically signed by Joseph Castañeda DO on 4/12/2025 at 7:52 AM

## 2025-04-13 PROBLEM — R94.31 T WAVE INVERSION IN EKG: Status: ACTIVE | Noted: 2025-04-13

## 2025-04-13 LAB
ANION GAP SERPL CALCULATED.3IONS-SCNC: 10 MMOL/L (ref 3–16)
APTT BLD: 33 SEC (ref 22.1–36.4)
BUN SERPL-MCNC: 12 MG/DL (ref 7–20)
CALCIUM SERPL-MCNC: 9.4 MG/DL (ref 8.3–10.6)
CHLORIDE SERPL-SCNC: 108 MMOL/L (ref 99–110)
CO2 SERPL-SCNC: 24 MMOL/L (ref 21–32)
CREAT SERPL-MCNC: 0.8 MG/DL (ref 0.6–1.2)
GFR SERPLBLD CREATININE-BSD FMLA CKD-EPI: 75 ML/MIN/{1.73_M2}
GLUCOSE SERPL-MCNC: 119 MG/DL (ref 70–99)
POTASSIUM SERPL-SCNC: 3.9 MMOL/L (ref 3.5–5.1)
SODIUM SERPL-SCNC: 142 MMOL/L (ref 136–145)
TROPONIN, HIGH SENSITIVITY: 22 NG/L (ref 0–14)

## 2025-04-13 PROCEDURE — 80048 BASIC METABOLIC PNL TOTAL CA: CPT

## 2025-04-13 PROCEDURE — 6370000000 HC RX 637 (ALT 250 FOR IP): Performed by: HOSPITALIST

## 2025-04-13 PROCEDURE — 6370000000 HC RX 637 (ALT 250 FOR IP)

## 2025-04-13 PROCEDURE — 36415 COLL VENOUS BLD VENIPUNCTURE: CPT

## 2025-04-13 PROCEDURE — 2500000003 HC RX 250 WO HCPCS: Performed by: HOSPITALIST

## 2025-04-13 PROCEDURE — 1200000000 HC SEMI PRIVATE

## 2025-04-13 PROCEDURE — 94760 N-INVAS EAR/PLS OXIMETRY 1: CPT

## 2025-04-13 PROCEDURE — 85730 THROMBOPLASTIN TIME PARTIAL: CPT

## 2025-04-13 PROCEDURE — 99222 1ST HOSP IP/OBS MODERATE 55: CPT

## 2025-04-13 PROCEDURE — 84484 ASSAY OF TROPONIN QUANT: CPT

## 2025-04-13 RX ORDER — SODIUM CHLORIDE 0.9 % (FLUSH) 0.9 %
5-40 SYRINGE (ML) INJECTION PRN
Status: DISCONTINUED | OUTPATIENT
Start: 2025-04-13 | End: 2025-04-13 | Stop reason: SDUPTHER

## 2025-04-13 RX ORDER — METOPROLOL TARTRATE 50 MG
150 TABLET ORAL PRN
Status: DISCONTINUED | OUTPATIENT
Start: 2025-04-14 | End: 2025-04-14 | Stop reason: HOSPADM

## 2025-04-13 RX ORDER — SODIUM CHLORIDE 0.9 % (FLUSH) 0.9 %
5-40 SYRINGE (ML) INJECTION EVERY 12 HOURS SCHEDULED
Status: DISCONTINUED | OUTPATIENT
Start: 2025-04-13 | End: 2025-04-13

## 2025-04-13 RX ORDER — METOPROLOL TARTRATE 50 MG
100 TABLET ORAL PRN
Status: DISCONTINUED | OUTPATIENT
Start: 2025-04-14 | End: 2025-04-14 | Stop reason: HOSPADM

## 2025-04-13 RX ORDER — METOPROLOL TARTRATE 1 MG/ML
5 INJECTION, SOLUTION INTRAVENOUS EVERY 5 MIN PRN
Status: DISCONTINUED | OUTPATIENT
Start: 2025-04-14 | End: 2025-04-14 | Stop reason: HOSPADM

## 2025-04-13 RX ORDER — NITROGLYCERIN 0.4 MG/1
0.4 TABLET SUBLINGUAL PRN
Status: COMPLETED | OUTPATIENT
Start: 2025-04-14 | End: 2025-04-14

## 2025-04-13 RX ORDER — SODIUM CHLORIDE 9 MG/ML
INJECTION, SOLUTION INTRAVENOUS PRN
Status: DISCONTINUED | OUTPATIENT
Start: 2025-04-13 | End: 2025-04-13 | Stop reason: SDUPTHER

## 2025-04-13 RX ORDER — NITROGLYCERIN 0.4 MG/1
0.8 TABLET SUBLINGUAL PRN
Status: COMPLETED | OUTPATIENT
Start: 2025-04-14 | End: 2025-04-14

## 2025-04-13 RX ORDER — METOPROLOL TARTRATE 50 MG
50 TABLET ORAL PRN
Status: DISCONTINUED | OUTPATIENT
Start: 2025-04-14 | End: 2025-04-14 | Stop reason: HOSPADM

## 2025-04-13 RX ADMIN — AMLODIPINE BESYLATE 5 MG: 5 TABLET ORAL at 21:04

## 2025-04-13 RX ADMIN — SODIUM CHLORIDE, PRESERVATIVE FREE 10 ML: 5 INJECTION INTRAVENOUS at 21:04

## 2025-04-13 RX ADMIN — METOPROLOL TARTRATE 12.5 MG: 25 TABLET, FILM COATED ORAL at 08:29

## 2025-04-13 RX ADMIN — APIXABAN 5 MG: 5 TABLET, FILM COATED ORAL at 08:29

## 2025-04-13 RX ADMIN — LOSARTAN POTASSIUM 25 MG: 25 TABLET, FILM COATED ORAL at 08:29

## 2025-04-13 RX ADMIN — SODIUM CHLORIDE, PRESERVATIVE FREE 10 ML: 5 INJECTION INTRAVENOUS at 08:29

## 2025-04-13 RX ADMIN — APIXABAN 5 MG: 5 TABLET, FILM COATED ORAL at 21:04

## 2025-04-13 NOTE — PROGRESS NOTES
V2.0    [facility] Progress Note      Name:  Rosanna Stanford /Age/Sex: 1945  (79 y.o. female)   MRN & CSN:  [id number] & [id number] Encounter Date/Time: 2025 7:52 AM EDT   Location:  [id number] PCP: Lou Underwood MD     Attending:Joseph Castañeda DO       Hospital Day: 3  Brief Hospital Course  Rosanna Stanford is a 79 y.o. female with pertinent PMHx of paroxysmal A-fib, HTN, HLD, T2DM who presented complaining of acute episode of lightheadedness, dizziness, palpitations.  Assessment & Plan     Palpitation, improved  Dizziness, improved  - Suspect this could possibly be related to episode of arrhythmia such as for paroxysmal A-fib or SVT  - Monitor on telemetry  - Remains asymptomatic at this time    Troponin elevation  - Troponin peaked, mildly elevated  - Cardiology consulted, planning for coronary CTA tomorrow    Paroxysmal atrial fibrillation  - S/p ablation and   - Continue anticoagulation with Eliquis  - Continue metoprolol  - Echocardiogram pending    Essential hypertension  - Continue losartan and amlodipine    Diet ADULT DIET; Regular; 5 carb choices (75 gm/meal); No Caffeine   DVT Prophylaxis [] Lovenox, []  Heparin, [] SCDs, [] Ambulation,  [x] Eliquis, [] Xarelto  [] Coumadin   Code Status Full Code   Disposition From: Home  Expected Disposition: Home  Estimated Date of Discharge:            Subjective:     Chief Complaint: Palpitations    Patient was seen and examined today sitting up in bed  Reports she has not had any further episodes of lightheadedness dizziness, or chest pain or palpitations since she has been here  Denies any shortness of breath  Says she feels fine currently    Review of Systems:      Pertinent positives and negatives discussed in HPI    Objective:     Intake/Output Summary (Last 24 hours) at 2025 1354  Last data filed at 2025 1401  Gross per 24 hour   Intake 290 ml   Output --   Net 290 ml      Vitals:   Vitals:    25 0737 25 2669  04/13/25 0936 04/13/25 1113   BP:    (!) 159/77   Pulse: 70 75 62 55   Resp:  17  18   Temp:    98.4 °F (36.9 °C)   TempSrc: Oral Oral  Oral   SpO2: 100% 98% 98% 97%   Weight:       Height:             Physical Exam:    Physical Exam  Constitutional:       General: She is not in acute distress.  Cardiovascular:      Rate and Rhythm: Normal rate and regular rhythm.      Heart sounds: No murmur heard.  Pulmonary:      Effort: Pulmonary effort is normal.      Breath sounds: No wheezing, rhonchi or rales.   Abdominal:      General: There is no distension.      Palpations: Abdomen is soft.      Tenderness: There is no abdominal tenderness.   Musculoskeletal:      Right lower leg: No edema.      Left lower leg: No edema.   Neurological:      Mental Status: She is alert.          Medications:   Medications:    metoprolol tartrate  12.5 mg Oral Daily    amLODIPine  5 mg Oral Nightly    Vitamin D  2,000 Units Oral Daily    losartan  25 mg Oral Daily    sodium chloride flush  5-40 mL IntraVENous 2 times per day    apixaban  5 mg Oral BID      Infusions:    sodium chloride       PRN Meds: sodium chloride flush, 5-40 mL, PRN  sodium chloride, , PRN  potassium chloride, 40 mEq, PRN   Or  potassium alternative oral replacement, 40 mEq, PRN   Or  potassium chloride, 10 mEq, PRN  magnesium sulfate, 2,000 mg, PRN  ondansetron, 4 mg, Q8H PRN   Or  ondansetron, 4 mg, Q6H PRN  acetaminophen, 650 mg, Q6H PRN   Or  acetaminophen, 650 mg, Q6H PRN  polyethylene glycol, 17 g, Daily PRN        Labs and Imaging   XR CHEST (2 VW)  Result Date: 4/11/2025  EXAMINATION: TWO XRAY VIEWS OF THE CHEST 4/11/2025 2:10 pm COMPARISON: None. HISTORY: ORDERING SYSTEM PROVIDED HISTORY: palpitations TECHNOLOGIST PROVIDED HISTORY: Reason for exam:->palpitations FINDINGS: Normal cardiomediastinal silhouette.  No acute airspace infiltrate.  No pneumothorax or pleural effusion     No acute cardiopulmonary findings       CBC:   Recent Labs     04/11/25  1504

## 2025-04-13 NOTE — PROGRESS NOTES
Patient refused new order of PO lopressor 12.5 mg at this time. RN educated patient. Patient would like to speak to the Doctor in the morning regarding this medication, Patient states that she thinks she has been on this medication in the past and that it didn't help. Electronically signed by Julieta Ruiz RN on 4/13/2025 at 12:48 AM

## 2025-04-13 NOTE — PLAN OF CARE
Problem: Chronic Conditions and Co-morbidities  Goal: Patient's chronic conditions and co-morbidity symptoms are monitored and maintained or improved  4/13/2025 0040 by Julieta Ruiz RN  Outcome: Progressing  Flowsheets (Taken 4/12/2025 2014)  Care Plan - Patient's Chronic Conditions and Co-Morbidity Symptoms are Monitored and Maintained or Improved:   Monitor and assess patient's chronic conditions and comorbid symptoms for stability, deterioration, or improvement   Collaborate with multidisciplinary team to address chronic and comorbid conditions and prevent exacerbation or deterioration   Update acute care plan with appropriate goals if chronic or comorbid symptoms are exacerbated and prevent overall improvement and discharge     Problem: Discharge Planning  Goal: Discharge to home or other facility with appropriate resources  4/13/2025 0040 by Julieta Ruiz RN  Outcome: Progressing  Flowsheets (Taken 4/12/2025 2014)  Discharge to home or other facility with appropriate resources: Identify barriers to discharge with patient and caregiver     Problem: Pain  Goal: Verbalizes/displays adequate comfort level or baseline comfort level  4/13/2025 0040 by Julieta Ruiz RN  Outcome: Progressing  Flowsheets (Taken 4/12/2025 2326)  Verbalizes/displays adequate comfort level or baseline comfort level:   Encourage patient to monitor pain and request assistance   Assess pain using appropriate pain scale   Administer analgesics based on type and severity of pain and evaluate response   Implement non-pharmacological measures as appropriate and evaluate response     Problem: Cardiovascular - Adult  Goal: Maintains optimal cardiac output and hemodynamic stability  Outcome: Progressing  Flowsheets (Taken 4/12/2025 2326)  Maintains optimal cardiac output and hemodynamic stability:   Monitor blood pressure and heart rate   Monitor urine output and notify Licensed Independent Practitioner for values outside of normal  range   Assess for signs of decreased cardiac output     Problem: Cardiovascular - Adult  Goal: Absence of cardiac dysrhythmias or at baseline  Outcome: Progressing  Flowsheets (Taken 4/12/2025 2326)  Absence of cardiac dysrhythmias or at baseline:   Monitor cardiac rate and rhythm   Assess for signs of decreased cardiac output   Administer antiarrhythmia medication and electrolyte replacement as ordered     Problem: Metabolic/Fluid and Electrolytes - Adult  Goal: Electrolytes maintained within normal limits  Outcome: Progressing  Flowsheets (Taken 4/12/2025 2326)  Electrolytes maintained within normal limits: Monitor labs and assess patient for signs and symptoms of electrolyte imbalances  Goal: Hemodynamic stability and optimal renal function maintained  Outcome: Progressing  Flowsheets (Taken 4/12/2025 2326)  Hemodynamic stability and optimal renal function maintained:   Monitor labs and assess for signs and symptoms of volume excess or deficit   Monitor intake, output and patient weight

## 2025-04-13 NOTE — ED PROVIDER NOTES
This is my MINO Supervisory and shared visit note:     This patient was seen by the advanced practice provider.     I personally saw the patient and made/approved the management plan and take responsibility for the patient management.      Briefly, 79 y.o. female presents with nausea.  Patient also reported lightheadedness and palpitations.  Palpitations lasted around 10 minutes.  Patient denies gait instability, dysarthria, facial droop, chest pain, shortness of breath.    Focused exam:   Gen: awake, alert, and NAD  HEENT: NCAT. EOMI.   CV: RRR w/o MRG  Lungs: CTAB. No incr WOB.   Abdomen: Soft, nontender, nondistended. No rebound/guarding.   Neuro: Moving all extremities, fluent speech, follows commands, 5 out of 5 strength in upper and lower extremities.  Finger-nose normal.  Heel-to-shin normal.  Cranial nerves II through XII intact.  Normal gait.  Test of skew negative.    My EKG interpretation:Normal sinus rhythm, ventricular rate of 65, normal axis, no STEMI.  New T wave inversion involving inferior leads, T wave inversions in anterior and lateral leads which are consistent with prior EKG.    MDM:   Patient is hemodynamically stable upon arrival to the emergency department.  Patient is afebrile.  Differential diagnose includes but not limited to electrolyte abnormality, anemia, ACS, stroke, TIA, hyperthyroidism, hypothyroidism, heart block, arrhythmia.  Patient is hemodynamically stable upon arrival to the emergency department.  Although on differential low suspicion for stroke based on clinical presentation and physical exam.  NIH stroke scale is 0.  Prior to [facility] labs and images ordered for patient.  Low suspicion for anemia as cause of patient's symptoms given CBC is within normal limits.  Urinalysis shows trace leukocyte esterase with +3 bacteria and 6 epithelial cells on microscopic urinalysis.  Low suspicion for UTI given patient's history, patient does not report any urinary symptoms such as

## 2025-04-13 NOTE — PLAN OF CARE
Problem: Chronic Conditions and Co-morbidities  Goal: Patient's chronic conditions and co-morbidity symptoms are monitored and maintained or improved  4/13/2025 0959 by Aissatou Gong RN  Outcome: Progressing  Flowsheets (Taken 4/13/2025 0815)  Care Plan - Patient's Chronic Conditions and Co-Morbidity Symptoms are Monitored and Maintained or Improved: Collaborate with multidisciplinary team to address chronic and comorbid conditions and prevent exacerbation or deterioration     Problem: Discharge Planning  Goal: Discharge to home or other facility with appropriate resources  4/13/2025 0959 by Aissatou Gong RN  Outcome: Progressing  Flowsheets (Taken 4/13/2025 0815)  Discharge to home or other facility with appropriate resources: Arrange for needed discharge resources and transportation as appropriate     Problem: Pain  Goal: Verbalizes/displays adequate comfort level or baseline comfort level  4/13/2025 0959 by Aissatou Gong RN  Outcome: Progressing     Problem: Cardiovascular - Adult  Goal: Absence of cardiac dysrhythmias or at baseline  4/13/2025 0959 by Aissatou Gong RN  Outcome: Progressing  Flowsheets (Taken 4/13/2025 0815)  Absence of cardiac dysrhythmias or at baseline: Assess for signs of decreased cardiac output     Problem: Metabolic/Fluid and Electrolytes - Adult  Goal: Electrolytes maintained within normal limits  4/13/2025 0959 by Aissatou Gong RN  Outcome: Progressing  Flowsheets (Taken 4/13/2025 0815)  Electrolytes maintained within normal limits: Administer electrolyte replacement as ordered     Problem: Metabolic/Fluid and Electrolytes - Adult  Goal: Hemodynamic stability and optimal renal function maintained  4/13/2025 0959 by Aissatou Gong RN  Outcome: Progressing  Flowsheets (Taken 4/13/2025 0815)  Hemodynamic stability and optimal renal function maintained: Monitor intake, output and patient weight

## 2025-04-13 NOTE — PROGRESS NOTES
Patient had 16 beats PAT at 0435. Patient asymptomatic. Electronically signed by Julieta Ruiz RN on 4/13/2025 at 6:03 AM

## 2025-04-14 ENCOUNTER — APPOINTMENT (OUTPATIENT)
Dept: CT IMAGING | Age: 80
DRG: 310 | End: 2025-04-14
Payer: MEDICARE

## 2025-04-14 ENCOUNTER — APPOINTMENT (OUTPATIENT)
Age: 80
DRG: 310 | End: 2025-04-14
Attending: HOSPITALIST
Payer: MEDICARE

## 2025-04-14 VITALS
TEMPERATURE: 97.9 F | SYSTOLIC BLOOD PRESSURE: 169 MMHG | HEIGHT: 62 IN | DIASTOLIC BLOOD PRESSURE: 68 MMHG | RESPIRATION RATE: 16 BRPM | OXYGEN SATURATION: 97 % | BODY MASS INDEX: 28.2 KG/M2 | WEIGHT: 153.22 LBS | HEART RATE: 58 BPM

## 2025-04-14 LAB
BASOPHILS # BLD: 0.1 K/UL (ref 0–0.2)
BASOPHILS NFR BLD: 1.3 %
DEPRECATED RDW RBC AUTO: 15.1 % (ref 12.4–15.4)
ECHO AO ROOT DIAM: 3.1 CM
ECHO AO ROOT INDEX: 1.81 CM/M2
ECHO AV AREA PEAK VELOCITY: 1.7 CM2
ECHO AV AREA VTI: 1.6 CM2
ECHO AV AREA/BSA PEAK VELOCITY: 1 CM2/M2
ECHO AV AREA/BSA VTI: 0.9 CM2/M2
ECHO AV MEAN GRADIENT: 3 MMHG
ECHO AV MEAN VELOCITY: 0.9 M/S
ECHO AV PEAK GRADIENT: 7 MMHG
ECHO AV PEAK VELOCITY: 1.3 M/S
ECHO AV VELOCITY RATIO: 0.85
ECHO AV VTI: 30 CM
ECHO BSA: 1.74 M2
ECHO EST RA PRESSURE: 62 MMHG
ECHO LA AREA 2C: 26.9 CM2
ECHO LA AREA 4C: 27.2 CM2
ECHO LA MAJOR AXIS: 6.8 CM
ECHO LA MINOR AXIS: 6.2 CM
ECHO LA VOL BP: 96 ML (ref 22–52)
ECHO LA VOL MOD A2C: 96 ML (ref 22–52)
ECHO LA VOL MOD A4C: 90 ML (ref 22–52)
ECHO LA VOL/BSA BIPLANE: 56 ML/M2 (ref 16–34)
ECHO LA VOLUME INDEX MOD A2C: 56 ML/M2 (ref 16–34)
ECHO LA VOLUME INDEX MOD A4C: 53 ML/M2 (ref 16–34)
ECHO LV E' LATERAL VELOCITY: 5.98 CM/S
ECHO LV E' SEPTAL VELOCITY: 3.16 CM/S
ECHO LV EDV 3D: 105 ML
ECHO LV EDV INDEX 3D: 61 ML/M2
ECHO LV EF PHYSICIAN: 65 %
ECHO LV EJECTION FRACTION 3D: 63 %
ECHO LV ESV 3D: 38 ML
ECHO LV ESV INDEX 3D: 22 ML/M2
ECHO LV FRACTIONAL SHORTENING: 40 % (ref 28–44)
ECHO LV INTERNAL DIMENSION DIASTOLE INDEX: 3.1 CM/M2
ECHO LV INTERNAL DIMENSION DIASTOLIC: 5.3 CM (ref 3.9–5.3)
ECHO LV INTERNAL DIMENSION SYSTOLIC INDEX: 1.87 CM/M2
ECHO LV INTERNAL DIMENSION SYSTOLIC: 3.2 CM
ECHO LV IVSD: 0.9 CM (ref 0.6–0.9)
ECHO LV MASS 2D: 213.9 G (ref 67–162)
ECHO LV MASS 3D INDEX: 82.5 G/M2
ECHO LV MASS 3D: 141 G
ECHO LV MASS INDEX 2D: 125.1 G/M2 (ref 43–95)
ECHO LV POSTERIOR WALL DIASTOLIC: 1.2 CM (ref 0.6–0.9)
ECHO LV RELATIVE WALL THICKNESS RATIO: 0.45
ECHO LVOT AREA: 2 CM2
ECHO LVOT AV VTI INDEX: 0.79
ECHO LVOT DIAM: 1.6 CM
ECHO LVOT MEAN GRADIENT: 2 MMHG
ECHO LVOT PEAK GRADIENT: 5 MMHG
ECHO LVOT PEAK VELOCITY: 1.1 M/S
ECHO LVOT STROKE VOLUME INDEX: 27.7 ML/M2
ECHO LVOT SV: 47.4 ML
ECHO LVOT VTI: 23.6 CM
ECHO MV E DECELERATION TIME (DT): 123 MS
ECHO MV E VELOCITY: 1.11 M/S
ECHO MV E/E' LATERAL: 18.56
ECHO MV E/E' RATIO (AVERAGED): 26.84
ECHO MV E/E' SEPTAL: 35.13
ECHO MV REGURGITANT PEAK GRADIENT: 139 MMHG
ECHO MV REGURGITANT PEAK VELOCITY: 5.9 M/S
ECHO MV REGURGITANT VTIA: 211 CM
ECHO PULMONARY ARTERY END DIASTOLIC PRESSURE: 8 MMHG
ECHO PV MAX VELOCITY: 0.9 M/S
ECHO PV MEAN GRADIENT: 2 MMHG
ECHO PV MEAN VELOCITY: 0.7 M/S
ECHO PV PEAK GRADIENT: 3 MMHG
ECHO PV REGURGITANT MAX VELOCITY: 1.4 M/S
ECHO PV VTI: 22.9 CM
ECHO RA AREA 4C: 19.2 CM2
ECHO RA END SYSTOLIC VOLUME APICAL 4 CHAMBER INDEX BSA: 29 ML/M2
ECHO RA VOLUME: 49 ML
ECHO RIGHT VENTRICULAR SYSTOLIC PRESSURE (RVSP): 115 MMHG
ECHO RV BASAL DIMENSION: 3.5 CM
ECHO RV FREE WALL PEAK S': 12.1 CM/S
ECHO RV MID DIMENSION: 2 CM
ECHO RV TAPSE: 2.6 CM (ref 1.7–?)
ECHO TV E WAVE: 0.8 M/S
ECHO TV REGURGITANT MAX VELOCITY: 3.63 M/S
ECHO TV REGURGITANT PEAK GRADIENT: 53 MMHG
EOSINOPHIL # BLD: 0.1 K/UL (ref 0–0.6)
EOSINOPHIL NFR BLD: 3.5 %
HCT VFR BLD AUTO: 37.1 % (ref 36–48)
HGB BLD-MCNC: 12.3 G/DL (ref 12–16)
LYMPHOCYTES # BLD: 2.2 K/UL (ref 1–5.1)
LYMPHOCYTES NFR BLD: 52 %
MCH RBC QN AUTO: 29.2 PG (ref 26–34)
MCHC RBC AUTO-ENTMCNC: 33.2 G/DL (ref 31–36)
MCV RBC AUTO: 87.9 FL (ref 80–100)
MONOCYTES # BLD: 0.3 K/UL (ref 0–1.3)
MONOCYTES NFR BLD: 7.8 %
NEUTROPHILS # BLD: 1.5 K/UL (ref 1.7–7.7)
NEUTROPHILS NFR BLD: 35.4 %
PLATELET # BLD AUTO: 173 K/UL (ref 135–450)
PMV BLD AUTO: 9.4 FL (ref 5–10.5)
RBC # BLD AUTO: 4.22 M/UL (ref 4–5.2)
WBC # BLD AUTO: 4.2 K/UL (ref 4–11)

## 2025-04-14 PROCEDURE — 76376 3D RENDER W/INTRP POSTPROCES: CPT | Performed by: INTERNAL MEDICINE

## 2025-04-14 PROCEDURE — 6360000004 HC RX CONTRAST MEDICATION: Performed by: INTERNAL MEDICINE

## 2025-04-14 PROCEDURE — 36415 COLL VENOUS BLD VENIPUNCTURE: CPT

## 2025-04-14 PROCEDURE — 85025 COMPLETE CBC W/AUTO DIFF WBC: CPT

## 2025-04-14 PROCEDURE — 6370000000 HC RX 637 (ALT 250 FOR IP): Performed by: HOSPITALIST

## 2025-04-14 PROCEDURE — 75574 CT ANGIO HRT W/3D IMAGE: CPT | Performed by: INTERNAL MEDICINE

## 2025-04-14 PROCEDURE — 75574 CT ANGIO HRT W/3D IMAGE: CPT

## 2025-04-14 PROCEDURE — 2500000003 HC RX 250 WO HCPCS: Performed by: HOSPITALIST

## 2025-04-14 PROCEDURE — 93306 TTE W/DOPPLER COMPLETE: CPT | Performed by: INTERNAL MEDICINE

## 2025-04-14 PROCEDURE — 93306 TTE W/DOPPLER COMPLETE: CPT

## 2025-04-14 PROCEDURE — 99233 SBSQ HOSP IP/OBS HIGH 50: CPT | Performed by: INTERNAL MEDICINE

## 2025-04-14 PROCEDURE — 6370000000 HC RX 637 (ALT 250 FOR IP): Performed by: STUDENT IN AN ORGANIZED HEALTH CARE EDUCATION/TRAINING PROGRAM

## 2025-04-14 PROCEDURE — 94760 N-INVAS EAR/PLS OXIMETRY 1: CPT

## 2025-04-14 RX ORDER — IOPAMIDOL 755 MG/ML
100 INJECTION, SOLUTION INTRAVASCULAR
Status: COMPLETED | OUTPATIENT
Start: 2025-04-14 | End: 2025-04-14

## 2025-04-14 RX ORDER — METOPROLOL TARTRATE 25 MG/1
25 TABLET, FILM COATED ORAL DAILY
Qty: 60 TABLET | Refills: 3 | Status: SHIPPED | OUTPATIENT
Start: 2025-04-15 | End: 2025-04-18 | Stop reason: ALTCHOICE

## 2025-04-14 RX ORDER — METOPROLOL TARTRATE 25 MG/1
25 TABLET, FILM COATED ORAL DAILY
Status: DISCONTINUED | OUTPATIENT
Start: 2025-04-15 | End: 2025-04-14 | Stop reason: HOSPADM

## 2025-04-14 RX ADMIN — NITROGLYCERIN 0.8 MG: 0.4 TABLET SUBLINGUAL at 13:49

## 2025-04-14 RX ADMIN — APIXABAN 5 MG: 5 TABLET, FILM COATED ORAL at 08:43

## 2025-04-14 RX ADMIN — LOSARTAN POTASSIUM 25 MG: 25 TABLET, FILM COATED ORAL at 08:43

## 2025-04-14 RX ADMIN — IOPAMIDOL 100 ML: 755 INJECTION, SOLUTION INTRAVENOUS at 14:09

## 2025-04-14 RX ADMIN — SODIUM CHLORIDE, PRESERVATIVE FREE 10 ML: 5 INJECTION INTRAVENOUS at 08:43

## 2025-04-14 ASSESSMENT — PAIN SCALES - GENERAL: PAINLEVEL_OUTOF10: 0

## 2025-04-14 NOTE — DISCHARGE SUMMARY
V2.0  Discharge Summary    Name:  Rosanna Stanford /Age/Sex: 1945 (79 y.o. female)   Admit Date: 2025  Discharge Date: 25    MRN & CSN:  [id number] & [id number] Encounter Date and Time 25 4:55 PM EDT    Attending:  Joseph Castañeda DO Discharging Provider: Joseph Castañeda DO       Hospital Course:     Brief HPI: Rosanna Stanford is a 79 y.o. female with a pertinent PMHx of paroxysmal A-fib, HTN, HLD, T2DM who presented complaining of acute episode of lightheadedness dizziness and palpitations while at the grocery store.    Brief Problem Based Course:     Palpitations, improved  - Most likely secondary to episodes of paroxysmal A-fib, SVT which were observed on telemetry  - Started on metoprolol per cardiology  - Asymptomatic    Troponin elevation  - Troponin peaked and was mildly elevated  - Echo showed normal EF of 60 to 65% with normal wall motion  - Coronary CT score of 0  - Outpatient follow-up with cardiology    Paroxysmal atrial fibrillation  - S/p ablation in   - Continue anticoagulation with Eliquis  - Metoprolol as above      The patient expressed appropriate understanding of, and agreement with the discharge recommendations, medications, and plan.     Consults this admission:  IP CONSULT TO CARDIOLOGY    Discharge Diagnosis:   Paroxysmal atrial fibrillation (HCC)  Palpitations  Paroxysmal supraventricular tachycardia    Discharge Instruction:   Follow up appointments: Cardiology  Primary care physician: Lou Underwood MD within 1 week  Diet: regular diet   Activity: activity as tolerated  Disposition: Discharged to:   [x]Home, []C, []SNF, []Acute Rehab, []Hospice   Condition on discharge: Stable  Labs and Tests to be Followed up as an outpatient by PCP or Specialist:     Discharge Medications:        Medication List        START taking these medications      metoprolol tartrate 25 MG tablet  Commonly known as: LOPRESSOR  Take 1 tablet by mouth daily  Start taking on: April 15,

## 2025-04-14 NOTE — PROGRESS NOTES
V2.0    [facility] Progress Note      Name:  Rosanna Stanford /Age/Sex: 1945  (79 y.o. female)   MRN & CSN:  [id number] & [id number] Encounter Date/Time: 2025 7:52 AM EDT   Location:  [id number] PCP: Lou Underwood MD     Attending:Joseph Castañeda DO       Hospital Day: 4  Brief Hospital Course  Rosanna Stanford is a 79 y.o. female with pertinent PMHx of paroxysmal A-fib, HTN, HLD, T2DM who presented complaining of acute episode of lightheadedness, dizziness, palpitations.  Assessment & Plan     Palpitation, improved  Dizziness, improved  - Most likely secondary to episodes of paroxysmal A-fib, SVT  - Continue to monitor on telemetry  - Currently asymptomatic    Troponin elevation  - Troponin peaked, mildly elevated  - Echo this morning showed normal EF of 60 to 65% with normal wall motion  - Coronary CT later today  - Cardiology following    Paroxysmal atrial fibrillation  - S/p ablation and   - Continue anticoagulation with Eliquis  - Metoprolol increased as she has still been having some brief episodes of paroxysmal A-fib    Essential hypertension  - Continue losartan and amlodipine    Independent interpretation of telemetry strip today  showing sinus bradycardia    Diet ADULT DIET; Regular; 5 carb choices (75 gm/meal); No Caffeine   DVT Prophylaxis [] Lovenox, []  Heparin, [] SCDs, [] Ambulation,  [x] Eliquis, [] Xarelto  [] Coumadin   Code Status Full Code   Disposition From: Home  Expected Disposition: Home  Estimated Date of Discharge: -4/15           Subjective:     Chief Complaint: Palpitations    Patient was seen and examined today sitting up in chair in room  She denies any chest pain or palpitations  Has had no further lightheadedness or dizziness since admission  Denies any shortness of breath    Review of Systems:      Pertinent positives and negatives discussed in HPI    Objective:     Intake/Output Summary (Last 24 hours) at 2025 1305  Last data filed at 2025  Cultures: No results found for: \"BC\"  No results found for: \"BLOODCULT2\"  Organism: No results found for: \"ORG\"      Electronically signed by Joseph Castañeda DO on 4/14/2025 at 1:05 PM

## 2025-04-14 NOTE — PROGRESS NOTES
4 Eyes Skin Assessment     NAME:  Rosanna Stanford  YOB: 1945  MEDICAL RECORD NUMBER:  [id number]    The patient is being assessed for  Admission    I agree that at least one RN has performed a thorough Head to Toe Skin Assessment on the patient. ALL assessment sites listed below have been assessed.      Areas assessed by both nurses:    Head, Face, Ears, Shoulders, Back, Chest, Arms, Elbows, Hands, Sacrum. Buttock, Coccyx, Ischium, and Legs. Feet and Heels        Does the Patient have a Wound? No noted wound(s)       Antony Prevention initiated by RN: No  Wound Care Orders initiated by RN: No    Pressure Injury (Stage 3,4, Unstageable, DTI, NWPT, and Complex wounds) if present, place Wound referral order by RN under : No    New Ostomies, if present place, Ostomy referral order under : No     Nurse 1 eSignature: Electronically signed by Kam Garay RN on 4/13/25 at 11:13 PM EDT    **SHARE this note so that the co-signing nurse can place an eSignature**    Nurse 2 eSignature: Electronically signed by Julieta Ruiz RN on 4/13/25 at 11:15 PM EDT

## 2025-04-14 NOTE — PROGRESS NOTES
Patient discharged to home. Family here as ride. IV's and tele removed. All belongings sent home. Discharge paperwork and education provided. Medications reviewed. All needs met and all questions answered. Patient and daughter expressed dissatisfaction with care but said they will follow up with their usual cardiologist to workout what they were upset over.   Allison Mejia RN\

## 2025-04-14 NOTE — PROGRESS NOTES
[facility] [place]   Daily Progress Note      Admit Date:  4/11/2025    CC: \"  Roasnna Stanford is a 79 y.o. patient with prior history of paroxysmal atrial fibrillation, hypertension, hyperlipidemia who presented to the hospital with complaints of heart palpitation and dizziness.  She did break out into mild cold sweats there is a mention of chest pain from the history but patient denies having any chest tightness to me.  She was brought to the hospital EKG revealed sinus rhythm  She has been admitted for further evaluation.  Her EKG had shown T wave inversion which appears unchanged from before but her troponin is slightly uptrending.  She denies having any further chest pain or palpitation.  More her monitor shows no atrial fibrillation yet  Pt with no acute overnight events. Denies chest pain, palpitations, and dyspnea.    Interval history 4/14/2025  Denies any chest pain, tightness or shortness of breath  Blood pressure still remains elevated  She underwent echocardiogram and going to undergo coronary CT angiography this afternoon.    Objective:   BP (!) 173/86 Comment: Rn aware  Pulse 57   Temp 98.4 °F (36.9 °C) (Oral)   Resp 16   Ht 1.575 m (5' 2\")   Wt 69.5 kg (153 lb 3.5 oz)   SpO2 98%   BMI 28.02 kg/m²     Intake/Output Summary (Last 24 hours) at 4/14/2025 0942  Last data filed at 4/14/2025 0843  Gross per 24 hour   Intake 195 ml   Output --   Net 195 ml     Wt Readings from Last 3 Encounters:   04/14/25 69.5 kg (153 lb 3.5 oz)   11/19/24 71.2 kg (157 lb)   10/18/24 69.9 kg (154 lb)     Telemetry:NSR    Physical Exam:  General:  NAD, Awake, alert and oriented X4  Skin:  Warm and dry  Neck:  Supple, no JVP appreciated, no bruit  Chest:  Clear to auscultation, no wheezes/rhonchi/rales  Cardiovascular:  Regular rate. S1S2  Abdomen:  Soft, nontender, +bowel sounds  Extremities:  No LE edema    Cardiac Diagnosis:  hypertension, hyperlipidemia, and atrial fibrillation    Medications:    metoprolol  tartrate  12.5 mg Oral Daily    amLODIPine  5 mg Oral Nightly    Vitamin D  2,000 Units Oral Daily    losartan  25 mg Oral Daily    sodium chloride flush  5-40 mL IntraVENous 2 times per day    apixaban  5 mg Oral BID      sodium chloride       metoprolol tartrate **OR** metoprolol tartrate **OR** metoprolol tartrate, metoprolol, nitroGLYCERIN **OR** nitroGLYCERIN, sodium chloride flush, sodium chloride, potassium chloride **OR** potassium alternative oral replacement **OR** potassium chloride, magnesium sulfate, ondansetron **OR** ondansetron, acetaminophen **OR** acetaminophen, polyethylene glycol    Lab Data:  CBC:   Recent Labs     04/11/25  2055 04/12/25  0443 04/14/25  0554   WBC 6.8 6.1 4.2   HGB 14.5 12.8 12.3    182 173     BMP:    Recent Labs     04/11/25  1504 04/12/25  0443 04/13/25  0431    142 142   K 4.3 3.6 3.9   CO2 22 23 24   BUN 20 16 12   CREATININE 1.0 0.8 0.8     LIVR:   Recent Labs     04/11/25  1504   AST 33   ALT 19     INR:  No results for input(s): \"INR\" in the last 72 hours.  APTT:   Recent Labs     04/12/25  1500 04/12/25  2009 04/13/25  0240   APTT 32.3 31.4 33.0     BNP:  No results for input(s): \"BNP\" in the last 72 hours.    Imaging:Echo: 8/3/22left ventricular cavity size is normal.   There is asymmetric hypertrophy of the basal septum.   No regional wall motion abnormalities are noted.   Ejection fraction is visually estimated to be 55-60 %.   Grade II diastolic dysfunction with elevated LV filling pressures.   Pt appears to be in A fib   Mild calcification of the posterior leaflet of the mitral valve.   Trivial mitral regurgitation.   The left atrium is severely dilated.   Normal right ventricular size and function. .   Mild tricuspid regurgitation.     Stress Test:     Cath:     Assessment & Plan   1) borderline troponin elevation  Given the patient denies any chest pain uptrending borderline troponin elevation is concerning for NSTEMI  She has history of  hypertension and hyperlipidemia and has risk for underlying CAD  - scheduled  for coronary CT angiography this afternoon to rule out underlying coronary artery disease  Further workup will depend on the results of coronary CT angiography     Paroxysmal atrial fibrillation  -She has presented with palpitations but there is no documented atrial fibrillation yet  Monitor still shows short runs of paroxysmal atrial fibrillation  -Continue Eliquis  Increase Toprol-XL to 50 mg daily  - Will check echocardiogram done this morning     Hypertension  -Blood pressure is mildly elevated today-will add beta-blocker therapy  Will increase Toprol-XL to 50 mg daily    Total visit time  35 minutes; > 50% spend counseling / coordinating care. I reviewed interval history, physical exam, review of data including labs, imaging, development and implementation of treatment plan and coordination of complex care. Counseled on risk factor modifications.      Electronically signed by Rick Hendricks MD on 4/14/2025 at 9:42 AM

## 2025-04-14 NOTE — PLAN OF CARE
Problem: Chronic Conditions and Co-morbidities  Goal: Patient's chronic conditions and co-morbidity symptoms are monitored and maintained or improved  4/13/2025 2315 by Kam Garay RN  Outcome: Progressing  Flowsheets (Taken 4/13/2025 2315)  Care Plan - Patient's Chronic Conditions and Co-Morbidity Symptoms are Monitored and Maintained or Improved: Monitor and assess patient's chronic conditions and comorbid symptoms for stability, deterioration, or improvement  4/13/2025 0959 by Aissatou Gong RN  Outcome: Progressing  Flowsheets (Taken 4/13/2025 0815)  Care Plan - Patient's Chronic Conditions and Co-Morbidity Symptoms are Monitored and Maintained or Improved: Collaborate with multidisciplinary team to address chronic and comorbid conditions and prevent exacerbation or deterioration     Problem: Discharge Planning  Goal: Discharge to home or other facility with appropriate resources  4/13/2025 2315 by Kam Garay RN  Outcome: Progressing  Flowsheets (Taken 4/13/2025 2315)  Discharge to home or other facility with appropriate resources:   Identify barriers to discharge with patient and caregiver   Arrange for needed discharge resources and transportation as appropriate  4/13/2025 0959 by Aissatou Gong RN  Outcome: Progressing  Flowsheets (Taken 4/13/2025 0815)  Discharge to home or other facility with appropriate resources: Arrange for needed discharge resources and transportation as appropriate     Problem: Pain  Goal: Verbalizes/displays adequate comfort level or baseline comfort level  4/13/2025 2315 by Kam Garay RN  Outcome: Progressing  Flowsheets (Taken 4/13/2025 2315)  Verbalizes/displays adequate comfort level or baseline comfort level: Encourage patient to monitor pain and request assistance  4/13/2025 0959 by Aissatou Gong RN  Outcome: Progressing     Problem: Cardiovascular - Adult  Goal: Maintains optimal cardiac output and hemodynamic stability  4/13/2025 2315 by Tanisha

## 2025-04-15 ENCOUNTER — CARE COORDINATION (OUTPATIENT)
Dept: CASE MANAGEMENT | Age: 80
End: 2025-04-15

## 2025-04-15 DIAGNOSIS — I48.0 PAROXYSMAL ATRIAL FIBRILLATION (HCC): Primary | ICD-10-CM

## 2025-04-15 PROCEDURE — 1111F DSCHRG MED/CURRENT MED MERGE: CPT | Performed by: STUDENT IN AN ORGANIZED HEALTH CARE EDUCATION/TRAINING PROGRAM

## 2025-04-15 NOTE — CARE COORDINATION
Care Transitions Note    Initial Call - Call within 2 business days of discharge: Yes    Patient Current Location:  Home: [address] [place] [address] Rd  [address]  [facility] [address]    Care Transition Nurse contacted the patient by telephone to perform post hospital discharge assessment, verified name and  as identifiers.  Provided introduction to self, and explanation of the Care Transition Nurse role.    Patient: Rosanna Stanford    Patient : 1945   MRN: [id number]    Reason for Admission: lightheadedness, dizziness, palpitations, PAF with SVT, HTN   Discharge Date: 25  RURS: Readmission Risk Score: 6.4      Last Discharge Facility       Date Complaint Diagnosis Description Type Department Provider    25 Nausea T wave inversion in EKG ... ED to Hosp-Admission (Discharged) (ADMITTED) STEVE CamiloN Joseph Castañeda, ; Kasi Cedillo...            Was this an external facility discharge? No    Additional needs identified to be addressed with provider   No needs identified             Method of communication with provider: none.    Patients top risk factors for readmission: medical condition-.    Interventions to address risk factors:   Education: .  Review of patient management of conditions/medications: .    Care Summary Note: Patient reports that she is doing well, denies any SOB, chest pain, palpitations, dizziness, lightheadedness.  -Discussed discharge instructions and reviewed medications, 1111F completed. She will  metoprolol today.  -She was not pleased with stay this time, provided [facility] patient advocate phone number [phone]  -CTN apologized and encouraged her to call patient advocate.  -She has a follow up with PCP this Friday, CTN will route message to request conversion to a hospital follow up VISIT TYPE.  Patient denies any questions or concerns, CTN will continue with outreach follow up calls.      Care Transition Nurse reviewed discharge instructions, medical action plan, and red flags with

## 2025-04-18 ENCOUNTER — OFFICE VISIT (OUTPATIENT)
Dept: INTERNAL MEDICINE CLINIC | Age: 80
End: 2025-04-18

## 2025-04-18 VITALS
OXYGEN SATURATION: 99 % | TEMPERATURE: 98.2 F | SYSTOLIC BLOOD PRESSURE: 120 MMHG | HEIGHT: 62 IN | WEIGHT: 156.2 LBS | DIASTOLIC BLOOD PRESSURE: 86 MMHG | HEART RATE: 86 BPM | BODY MASS INDEX: 28.74 KG/M2

## 2025-04-18 DIAGNOSIS — Z09 HOSPITAL DISCHARGE FOLLOW-UP: Primary | ICD-10-CM

## 2025-04-18 DIAGNOSIS — I48.0 PAROXYSMAL ATRIAL FIBRILLATION (HCC): ICD-10-CM

## 2025-04-18 DIAGNOSIS — I10 PRIMARY HYPERTENSION: ICD-10-CM

## 2025-04-18 ASSESSMENT — PATIENT HEALTH QUESTIONNAIRE - PHQ9
2. FEELING DOWN, DEPRESSED OR HOPELESS: NOT AT ALL
1. LITTLE INTEREST OR PLEASURE IN DOING THINGS: NOT AT ALL
SUM OF ALL RESPONSES TO PHQ QUESTIONS 1-9: 0

## 2025-04-18 NOTE — PROGRESS NOTES
Post-Discharge Transitional Care  Follow Up      Rosanna Stanford   YOB: 1945    Date of Office Visit:  4/18/2025  Date of Hospital Admission: 4/11/25  Date of Hospital Discharge: 4/14/25  Risk of hospital readmission (high >=14%. Medium >=10%) :Readmission Risk Score: 6.4      Care management risk score Rising risk (score 2-5) and Complex Care (Scores >=6): No Risk Score On File     Non face to face  following discharge, date last encounter closed (first attempt may have been earlier): 04/15/2025    Call initiated 2 business days of discharge: Yes    ASSESSMENT/PLAN:   Hospital discharge follow-up  -     [place] DISCHARGE MEDS RECONCILED W/ CURRENT OUTPATIENT MED LIST  Paroxysmal atrial fibrillation (HCC)  Assessment & Plan:  See cards  Recently underwent ablation in 10/2023. Off from Flecanide.   Anticoag with Eliquis.   Had an episode of Afib and was admitted at [facility] - also has negative T waves on ECG but work up for ischemia is negative. She was d/c on Metoprolol but does not want to take it. HR at home in the ranges of 50-60s. Doing well. No other episode - ok to hold off on Metoprolol at this time until follow up with Cards.   New Cards referral given per pt request   Discussed obtaining a heart monitor but pt declined due to bad reaction with bandages      [id number] Score for Atrial Fibrillation Stroke Risk    Risk   Factors   Component Value   C CHF No 0   H HTN Yes 1   A2 Age >= 75 Yes,  (78 y.o.) 2   D DM Yes 1   S2 Prior Stroke/TIA No 0   V Vascular Disease No 0   A Age 65-74 No,  (78 y.o.) 0   Sc Sex female 1     [id number]  Score   5   Score last updated 4/18/24 9:55 AM EDT     Click here for a link to the UpToDate guideline \"Atrial Fibrillation: Anticoagulation therapy to prevent embolization     Disclaimer: Risk Score calculation is dependent on accuracy of patient problem list and past encounter diagnosis.     Orders:  -     August Bangura MD, Cardiology,

## 2025-04-18 NOTE — ASSESSMENT & PLAN NOTE
Well controlled.   She declined metoprolol see above   On Losartan 25 mg and Amlodipine 5 mg. Discussed to consolidate medications as she is on 2 meds low dose but she reports does not tolerating higher dosage.   Will keep meds same at this time as BP at goal

## 2025-04-18 NOTE — ASSESSMENT & PLAN NOTE
See cards  Recently underwent ablation in 10/2023. Off from Flecanide.   Anticoag with Eliquis.   Had an episode of Afib and was admitted at [facility] - also has negative T waves on ECG but work up for ischemia is negative. She was d/c on Metoprolol but does not want to take it. HR at home in the ranges of 50-60s. Doing well. No other episode - ok to hold off on Metoprolol at this time until follow up with Cards.   New Cards referral given per pt request   Discussed obtaining a heart monitor but pt declined due to bad reaction with bandages      [id number] Score for Atrial Fibrillation Stroke Risk    Risk   Factors   Component Value   C CHF No 0   H HTN Yes 1   A2 Age >= 75 Yes,  (78 y.o.) 2   D DM Yes 1   S2 Prior Stroke/TIA No 0   V Vascular Disease No 0   A Age 65-74 No,  (78 y.o.) 0   Sc Sex female 1     [id number]  Score   5   Score last updated 4/18/24 9:55 AM EDT     Click here for a link to the UpToDate guideline \"Atrial Fibrillation: Anticoagulation therapy to prevent embolization     Disclaimer: Risk Score calculation is dependent on accuracy of patient problem list and past encounter diagnosis.

## 2025-04-20 NOTE — PROGRESS NOTES
Physician Progress Note      PATIENT:               YASMIN ROLLE  [facility] #:                  [id number]  :                       1945  ADMIT DATE:       2025 1:16 PM  DISCH DATE:        2025 5:33 PM  RESPONDING  PROVIDER #:        Joseph Castañeda DO          QUERY TEXT:    Based on your medical judgment, please clarify these findings and document if   any of the following are being evaluated and/or treated:    The clinical indicators include:  This is 79yr old female present with Chest pain    -Patient constellation of symptoms is likely secondary to atrial fibrillation.    EKG compared to previous shows somewhat worsening of T wave inversions.    Will continue patient on her Eliquis.  Daily aspirin ordered.  Cardiology   consult. noted in IM H&P by Hayden Hdez MD on     -Paroxysmal atrial fibrillation ,She has presented with palpitations but there   is no documented atrial fibrillation yet ,Will continue to follow her rhythm   closely ,Continue Eliquis noted in Cardiology consult by Rick Hendricks MD   on     -Paroxysmal Atrial Fibrillation ,Diagnosed in 22,- Symptomatic with   palpitations ,s/p unsuccessful KAYLEN due to inability to pass probe down   esophagus ,S/p EPS with RFA of atrial fibrillation, left atrial flutter, PVI   and CTI dependent right atrial flutter on 10/17/2023 with Dr. Zapata ,CHADS-VASc   5 (Age, Gender, HTN, DM) noted in Electrophysiology PN by Norma Madera,   APRN - CNP on   -ELIQUIS oral (By Epic MAR)    Thank you,  Renea Harris.  Options provided:  -- Secondary hypercoagulable state in a patient with atrial fibrillation  -- Other - I will add my own diagnosis  -- Disagree - Not applicable / Not valid  -- Disagree - Clinically unable to determine / Unknown  -- Refer to Clinical Documentation Reviewer    PROVIDER RESPONSE TEXT:    This patient has secondary hypercoagulable state in a patient with atrial   fibrillation.    Query created by:  Renea Howe on 4/16/2025 1:25 AM      Electronically signed by:  Joseph Castañeda DO 4/20/2025 9:19 AM

## 2025-04-22 ENCOUNTER — CARE COORDINATION (OUTPATIENT)
Dept: CASE MANAGEMENT | Age: 80
End: 2025-04-22

## 2025-04-22 NOTE — CARE COORDINATION
Care Transitions Note    Follow Up Call     Patient Current Location:  Home: [facility] [address]  [address]  [facility] [address]    [facility] Care Coordinator contacted the patient by telephone. Verified name and  as identifiers.    Additional needs identified to be addressed with provider   No needs identified                 Method of communication with provider: none.    Care Summary Note:   Rosanna reports she is doing fine. Denies dizziness, lightheadedness, ha, sob, cough, cp or palpitations. Adequate dietary intake. No urinary or bowel elimination problems. Taking medication as prescribed. Last /60. No home care needed. No  needs or concerns at this time.       Plan of care updates since last contact:  Review of patient management of conditions/medications:         Advance Care Planning:   Does patient have an Advance Directive: not on file.    Medication Review:  No changes since last call.     Remote Patient Monitoring:  Offered patient enrollment in the Remote Patient Monitoring (RPM) program for in-home monitoring: Yes, but did not enroll at this time: already monitoring with home equipment.    Assessments:  Care Transitions Subsequent and Final Call    Subsequent and Final Calls  Do you have any ongoing symptoms?: No  Have your medications changed?: No  Do you have any questions related to your medications?: No  Do you currently have any active services?: No  Do you have any needs or concerns that I can assist you with?: No  Identified Barriers: None  Care Transitions Interventions  Other Interventions:              Follow Up Appointment:   [facility] appointment attended as scheduled   Future Appointments         Provider Specialty Dept Phone    10/17/2025 10:00 AM Lou Underwood MD Internal Medicine [phone]    2025 8:15 AM Ephraim Ravi MD Cardiology [phone]            [facility] Care Coordinator provided contact information.  Plan for follow-up call in 6-10 days based on severity of symptoms and

## 2025-04-29 ENCOUNTER — CARE COORDINATION (OUTPATIENT)
Dept: CASE MANAGEMENT | Age: 80
End: 2025-04-29

## 2025-04-29 NOTE — PROGRESS NOTES
Physician Progress Note      PATIENT:               YASMIN ROLLE  CSN #:                  [id number]  :                       1945  ADMIT DATE:       2025 1:16 PM  DISCH DATE:        2025 5:33 PM  RESPONDING  PROVIDER #:        Rick Hendricks MD          QUERY TEXT:    Concern for NSTEMI, risk for underlying CAD was documented in progress note   .  Please clarify the status of this condition.    The clinical indicators include:  --Troponin 21 ( 1715)  --Troponin 30 ( 0443)  --presentation with nausea, lightheadedness, palpitation (ED provider note   )  --\"presents with Chest pain.\"  (H/P )  --\"patient constellation of symptoms is likely secondary to atrial   fibrillation\" (H/P )  --\"risk for underlying CAD\" (cardiology pn )  --\"No evidence of coronary stenosis or plaque\" (CTA Cardiac )  --Risk factors include HTN, HLD, T2DM, Afib (H/P )  --Treatment includes EKG, imaging/CTA Cardiac, Cardiology consult,  mg   (MAR 4/11)  Options provided:  -- CAD ruled out, elevated troponin more likely Type 2 MI r/t afib with nausea   as anginal equivalent.  -- CAD and NSTEMI ruled out. Elevated troponin more likely acute myocardial   injury r/t afib.  -- Concern for NSTEMI could not be ruled out, related to specified etiology,   please specify:  -- Other - I will add my own diagnosis  -- Disagree - Not applicable / Not valid  -- Disagree - Clinically unable to determine / Unknown  -- Refer to Clinical Documentation Reviewer    PROVIDER RESPONSE TEXT:    This patient has CAD ruled out, elevated troponin more likely Type 2 MI r/t   afib with nausea as anginal equivalent.    Query created by: Gabby Joseph on 2025 8:31 AM      Electronically signed by:  Rick Hendricks MD 2025 2:40 PM

## 2025-04-29 NOTE — CARE COORDINATION
Care Transitions Note    Follow Up Call     Attempted to reach patient for transitions of care follow up.  Unable to reach patient.      Outreach Attempts:   HIPAA compliant voicemail left for patient.     Care Summary Note: Follow up outreach call attempt, no answer.  CTN left VM with contact information and request for return call.  CTN will continue with outreach call attempts.      Follow Up Appointment:   Future Appointments         Provider Specialty Dept Phone    9/18/2025 9:00 AM August Hernandez MD Cardiology [phone]    10/17/2025 10:00 AM Lou Underwood MD Internal Medicine [phone]    11/20/2025 8:15 AM Ephraim Ravi MD Cardiology [phone]            Plan for follow-up call in 6-10 days based on severity of symptoms and risk factors. Plan for next call: symptom management-.  self management-.  follow-up appointment-.    NU CLAY RN

## 2025-05-06 ENCOUNTER — CARE COORDINATION (OUTPATIENT)
Dept: CASE MANAGEMENT | Age: 80
End: 2025-05-06

## 2025-05-06 NOTE — CARE COORDINATION
Care Transitions Note    Follow Up Call     Attempted to reach patient for transitions of care follow up.  Unable to reach patient.      Outreach Attempts:   HIPAA compliant voicemail left for patient.     Care Summary Note: Second & final outreach attempt:  Follow up outreach call attempt, no answer.  CTN left VM with contact information and request for return call.  CTN will close program & remain available.      Follow Up Appointment:   Future Appointments         Provider Specialty Dept Phone    9/18/2025 9:00 AM August Hernandez MD Cardiology [phone]    10/17/2025 10:00 AM Lou Underwood MD Internal Medicine [phone]    11/20/2025 8:15 AM Ephraim Ravi MD Cardiology [phone]            No further follow-up call indicated    NU CLAY RN

## 2025-05-09 ENCOUNTER — TELEPHONE (OUTPATIENT)
Dept: INTERNAL MEDICINE CLINIC | Age: 80
End: 2025-05-09

## 2025-05-09 DIAGNOSIS — I10 ESSENTIAL HYPERTENSION: ICD-10-CM

## 2025-05-09 RX ORDER — AMLODIPINE BESYLATE 5 MG/1
5 TABLET ORAL NIGHTLY
Qty: 90 TABLET | Refills: 1 | Status: SHIPPED | OUTPATIENT
Start: 2025-05-09

## 2025-05-09 NOTE — TELEPHONE ENCOUNTER
Pt needs refill     amLODIPine (NORVASC) 5 MG tablet [[id number]]   [facility] - [place], [place] - [address] [place] [address] [address]VD - P [phone] - F [phone]

## 2025-05-13 PROBLEM — R79.89 ELEVATED TROPONIN: Status: RESOLVED | Noted: 2025-04-11 | Resolved: 2025-05-13

## 2025-06-23 DIAGNOSIS — I10 ESSENTIAL HYPERTENSION: ICD-10-CM

## 2025-06-23 RX ORDER — LOSARTAN POTASSIUM 25 MG/1
25 TABLET ORAL DAILY
Qty: 90 TABLET | Refills: 2 | Status: SHIPPED | OUTPATIENT
Start: 2025-06-23